# Patient Record
Sex: MALE | Race: WHITE | NOT HISPANIC OR LATINO | Employment: OTHER | URBAN - NONMETROPOLITAN AREA
[De-identification: names, ages, dates, MRNs, and addresses within clinical notes are randomized per-mention and may not be internally consistent; named-entity substitution may affect disease eponyms.]

---

## 2017-08-04 ENCOUNTER — OFFICE VISIT (OUTPATIENT)
Dept: UROLOGY | Facility: CLINIC | Age: 52
End: 2017-08-04

## 2017-08-04 VITALS
DIASTOLIC BLOOD PRESSURE: 65 MMHG | OXYGEN SATURATION: 93 % | HEART RATE: 68 BPM | SYSTOLIC BLOOD PRESSURE: 117 MMHG | TEMPERATURE: 98.7 F | RESPIRATION RATE: 16 BRPM | WEIGHT: 140.2 LBS

## 2017-08-04 DIAGNOSIS — N32.3 BLADDER DIVERTICULUM: ICD-10-CM

## 2017-08-04 DIAGNOSIS — N30.00 ACUTE CYSTITIS WITHOUT HEMATURIA: ICD-10-CM

## 2017-08-04 DIAGNOSIS — N40.0 BENIGN NON-NODULAR PROSTATIC HYPERPLASIA WITHOUT LOWER URINARY TRACT SYMPTOMS: Primary | ICD-10-CM

## 2017-08-04 LAB
BILIRUB BLD-MCNC: NEGATIVE MG/DL
CLARITY, POC: CLEAR
COLOR UR: YELLOW
GLUCOSE UR STRIP-MCNC: NEGATIVE MG/DL
KETONES UR QL: NEGATIVE
LEUKOCYTE EST, POC: ABNORMAL
NITRITE UR-MCNC: NEGATIVE MG/ML
PH UR: 7 [PH] (ref 5–8)
PROT UR STRIP-MCNC: NEGATIVE MG/DL
RBC # UR STRIP: NEGATIVE /UL
SP GR UR: 1.01 (ref 1–1.03)
UROBILINOGEN UR QL: NORMAL

## 2017-08-04 PROCEDURE — 81003 URINALYSIS AUTO W/O SCOPE: CPT | Performed by: UROLOGY

## 2017-08-04 PROCEDURE — 99213 OFFICE O/P EST LOW 20 MIN: CPT | Performed by: UROLOGY

## 2017-08-04 RX ORDER — CALCIUM CARBONATE 500(1250)
500 TABLET ORAL DAILY
Refills: 11 | COMMUNITY
Start: 2017-06-28 | End: 2021-02-17 | Stop reason: HOSPADM

## 2017-08-04 RX ORDER — LAMOTRIGINE 100 MG/1
TABLET ORAL
Refills: 11 | COMMUNITY
Start: 2017-06-28 | End: 2020-03-09 | Stop reason: HOSPADM

## 2017-08-04 RX ORDER — QUETIAPINE FUMARATE 50 MG/1
TABLET, FILM COATED ORAL
Refills: 0 | COMMUNITY
Start: 2017-06-15 | End: 2020-03-09 | Stop reason: HOSPADM

## 2017-08-04 RX ORDER — SULFAMETHOXAZOLE AND TRIMETHOPRIM 400; 80 MG/1; MG/1
1 TABLET ORAL 2 TIMES DAILY
Qty: 17 TABLET | Refills: 0 | Status: SHIPPED | OUTPATIENT
Start: 2017-08-04 | End: 2017-08-11

## 2017-08-04 RX ORDER — RISPERIDONE 2 MG/1
TABLET ORAL
Refills: 11 | COMMUNITY
Start: 2017-07-13 | End: 2017-08-04 | Stop reason: DRUGHIGH

## 2017-08-04 RX ORDER — MULTIVITAMIN
1 TABLET ORAL DAILY
Refills: 11 | COMMUNITY
Start: 2017-06-19

## 2017-08-04 RX ORDER — LANOLIN ALCOHOL/MO/W.PET/CERES
CREAM (GRAM) TOPICAL
Refills: 11 | COMMUNITY
Start: 2017-06-19 | End: 2017-08-04 | Stop reason: SDUPTHER

## 2017-08-04 RX ORDER — HALOPERIDOL DECANOATE 50 MG/ML
INJECTION INTRAMUSCULAR
Refills: 0 | COMMUNITY
Start: 2017-05-10 | End: 2020-03-09 | Stop reason: HOSPADM

## 2017-08-04 RX ORDER — TAMSULOSIN HYDROCHLORIDE 0.4 MG/1
1 CAPSULE ORAL NIGHTLY
Qty: 90 CAPSULE | Refills: 3 | Status: SHIPPED | OUTPATIENT
Start: 2017-08-04 | End: 2018-05-29 | Stop reason: SDUPTHER

## 2017-08-04 RX ORDER — LEVOTHYROXINE SODIUM 0.07 MG/1
TABLET ORAL
Refills: 11 | COMMUNITY
Start: 2017-07-13 | End: 2017-08-04 | Stop reason: DRUGHIGH

## 2017-08-04 NOTE — PROGRESS NOTES
Chief Complaint  Benign Prostatic Hypertrophy (yearly fup.)        ROBBIE Luna is a 52 y.o. male who is retarded and known have a neurogenic bladder with numerous bladder diverticuli.  His caretaker states he voids frequently but seems to empty on Flomax.  He's had one infection during the past 12 months and there are some white cells in the urine today.  He gets quite agitated with examination.    Vitals:    08/04/17 0933   BP: 117/65   Pulse: 68   Resp: 16   Temp: 98.7 °F (37.1 °C)   SpO2: 93%       Past Medical History  Past Medical History:   Diagnosis Date   • BPH with urinary obstruction    • Neurogenic bladder    • Rash    • Urinary retention        Past Surgical History  No past surgical history on file.    Medications  has a current medication list which includes the following prescription(s): alprazolam, budesonide, calcitonin (salmon), clonazepam, divalproex, docusate sodium, esomeprazole, haloperidol decanoate, lamotrigine, levothyroxine, lisinopril, melatonin, mirtazapine, multivitamin, olanzapine, oyster shell calcium, risperidone, tamsulosin, and quetiapine.      Allergies  No Known Allergies    Social History  Social History     Social History Narrative       Family History  He has no family history of bladder or kidney cancer  He has no family history of kidney stones      AUA Symptom Score:      Review of Systems  Review of Systems   Constitutional: Negative.    Genitourinary: Positive for difficulty urinating and frequency.   Psychiatric/Behavioral: Positive for agitation and confusion. The patient is nervous/anxious.    All other systems reviewed and are negative.      Physical Exam  Physical Exam    Labs Recent and today in the office:  Results for orders placed or performed in visit on 08/04/17   POC Urinalysis Dipstick, Automated   Result Value Ref Range    Color Yellow Yellow, Straw, Dark Yellow, Nohemi    Clarity, UA Clear Clear    Glucose, UA Negative Negative, 1000 mg/dL (3+) mg/dL     Bilirubin Negative Negative    Ketones, UA Negative Negative    Specific Gravity  1.015 1.005 - 1.030    Blood, UA Negative Negative    pH, Urine 7.0 5.0 - 8.0    Protein, POC Negative Negative mg/dL    Urobilinogen, UA Normal Normal    Leukocytes Moderate (2+) (A) Negative    Nitrite, UA Negative Negative         Assessment & Plan  A urine culture is submitted and he'll be given a trial of Bactrim.  If he has more than 3 infections in 1 year he should probably be on chronic suppression.  He only had 2 this year if this one is positive.

## 2017-08-06 LAB
BACTERIA UR CULT: NO GROWTH
BACTERIA UR CULT: NORMAL

## 2017-09-28 ENCOUNTER — TRANSCRIBE ORDERS (OUTPATIENT)
Dept: NUCLEAR MEDICINE | Facility: HOSPITAL | Age: 52
End: 2017-09-28

## 2017-09-28 DIAGNOSIS — R10.11 RIGHT UPPER QUADRANT ABDOMINAL PAIN: Primary | ICD-10-CM

## 2017-10-03 ENCOUNTER — HOSPITAL ENCOUNTER (OUTPATIENT)
Dept: NUCLEAR MEDICINE | Facility: HOSPITAL | Age: 52
Discharge: HOME | End: 2017-10-03

## 2017-10-03 DIAGNOSIS — R10.11 RIGHT UPPER QUADRANT ABDOMINAL PAIN: ICD-10-CM

## 2017-10-03 PROCEDURE — 0 TECHNETIUM TC 99M MEBROFENIN KIT: Performed by: NURSE PRACTITIONER

## 2017-10-03 PROCEDURE — 78227 HEPATOBIL SYST IMAGE W/DRUG: CPT

## 2017-10-03 PROCEDURE — A9537 TC99M MEBROFENIN: HCPCS | Performed by: NURSE PRACTITIONER

## 2017-10-03 RX ORDER — KIT FOR THE PREPARATION OF TECHNETIUM TC 99M MEBROFENIN 45 MG/10ML
1 INJECTION, POWDER, LYOPHILIZED, FOR SOLUTION INTRAVENOUS
Status: COMPLETED | OUTPATIENT
Start: 2017-10-03 | End: 2017-10-03

## 2017-10-03 RX ADMIN — MEBROFENIN 1 DOSE: 45 INJECTION, POWDER, LYOPHILIZED, FOR SOLUTION INTRAVENOUS at 08:25

## 2017-10-11 ENCOUNTER — TRANSCRIBE ORDERS (OUTPATIENT)
Dept: ADMINISTRATIVE | Facility: HOSPITAL | Age: 52
End: 2017-10-11

## 2017-10-11 DIAGNOSIS — R10.11 ABDOMINAL PAIN, RIGHT UPPER QUADRANT: Primary | ICD-10-CM

## 2017-10-17 ENCOUNTER — HOSPITAL ENCOUNTER (OUTPATIENT)
Dept: MRI IMAGING | Facility: HOSPITAL | Age: 52
Discharge: HOME | End: 2017-10-17

## 2017-10-17 DIAGNOSIS — R10.11 ABDOMINAL PAIN, RIGHT UPPER QUADRANT: ICD-10-CM

## 2018-01-05 ENCOUNTER — LAB REQUISITION (OUTPATIENT)
Dept: LAB | Facility: HOSPITAL | Age: 53
End: 2018-01-05

## 2018-01-05 DIAGNOSIS — R39.15 URGENCY OF URINATION: ICD-10-CM

## 2018-01-05 LAB
BACTERIA UR QL AUTO: ABNORMAL /HPF
BILIRUB UR QL STRIP: NEGATIVE
CLARITY UR: ABNORMAL
COLOR UR: YELLOW
GLUCOSE UR STRIP-MCNC: NEGATIVE MG/DL
HGB UR QL STRIP.AUTO: NEGATIVE
HYALINE CASTS UR QL AUTO: ABNORMAL /LPF
KETONES UR QL STRIP: NEGATIVE
LEUKOCYTE ESTERASE UR QL STRIP.AUTO: ABNORMAL
MUCOUS THREADS URNS QL MICRO: ABNORMAL /HPF
NITRITE UR QL STRIP: NEGATIVE
PH UR STRIP.AUTO: 6 [PH] (ref 5–8)
PROT UR QL STRIP: NEGATIVE
RBC # UR: ABNORMAL /HPF
REF LAB TEST METHOD: ABNORMAL
SP GR UR STRIP: 1.01 (ref 1–1.03)
SQUAMOUS #/AREA URNS HPF: ABNORMAL /HPF
UROBILINOGEN UR QL STRIP: ABNORMAL
WBC UR QL AUTO: ABNORMAL /HPF

## 2018-01-05 PROCEDURE — 81001 URINALYSIS AUTO W/SCOPE: CPT | Performed by: NURSE PRACTITIONER

## 2018-01-05 PROCEDURE — 87086 URINE CULTURE/COLONY COUNT: CPT | Performed by: NURSE PRACTITIONER

## 2018-01-07 LAB — BACTERIA SPEC AEROBE CULT: NORMAL

## 2018-01-19 ENCOUNTER — LAB REQUISITION (OUTPATIENT)
Dept: LAB | Facility: HOSPITAL | Age: 53
End: 2018-01-19

## 2018-01-19 DIAGNOSIS — R35.0 FREQUENCY OF MICTURITION: ICD-10-CM

## 2018-01-19 LAB
BACTERIA UR QL AUTO: ABNORMAL /HPF
BILIRUB UR QL STRIP: NEGATIVE
CLARITY UR: CLEAR
COLOR UR: ABNORMAL
GLUCOSE UR STRIP-MCNC: NEGATIVE MG/DL
HGB UR QL STRIP.AUTO: NEGATIVE
HYALINE CASTS UR QL AUTO: ABNORMAL /LPF
KETONES UR QL STRIP: NEGATIVE
LEUKOCYTE ESTERASE UR QL STRIP.AUTO: ABNORMAL
NITRITE UR QL STRIP: NEGATIVE
PH UR STRIP.AUTO: 5.5 [PH] (ref 5–8)
PROT UR QL STRIP: NEGATIVE
RBC # UR: ABNORMAL /HPF
REF LAB TEST METHOD: ABNORMAL
SP GR UR STRIP: <=1.005 (ref 1–1.03)
SQUAMOUS #/AREA URNS HPF: ABNORMAL /HPF
TRANS CELLS #/AREA URNS HPF: ABNORMAL /HPF
UNIDENT CRYS URNS QL MICRO: ABNORMAL /HPF
UROBILINOGEN UR QL STRIP: ABNORMAL
WBC CLUMPS # UR AUTO: ABNORMAL /HPF
WBC UR QL AUTO: ABNORMAL /HPF

## 2018-01-19 PROCEDURE — 81001 URINALYSIS AUTO W/SCOPE: CPT | Performed by: NURSE PRACTITIONER

## 2018-01-19 PROCEDURE — 87186 SC STD MICRODIL/AGAR DIL: CPT | Performed by: NURSE PRACTITIONER

## 2018-01-19 PROCEDURE — 87077 CULTURE AEROBIC IDENTIFY: CPT | Performed by: NURSE PRACTITIONER

## 2018-01-19 PROCEDURE — 87086 URINE CULTURE/COLONY COUNT: CPT | Performed by: NURSE PRACTITIONER

## 2018-01-23 LAB
BACTERIA SPEC AEROBE CULT: ABNORMAL
BACTERIA SPEC AEROBE CULT: ABNORMAL

## 2018-01-26 ENCOUNTER — OFFICE VISIT (OUTPATIENT)
Dept: ORTHOPEDIC SURGERY | Facility: CLINIC | Age: 53
End: 2018-01-26

## 2018-01-26 VITALS — RESPIRATION RATE: 18 BRPM | WEIGHT: 130 LBS | BODY MASS INDEX: 23.92 KG/M2 | HEIGHT: 62 IN

## 2018-01-26 DIAGNOSIS — M79.671 RIGHT FOOT PAIN: Primary | ICD-10-CM

## 2018-01-26 DIAGNOSIS — S93.401A SPRAIN OF RIGHT ANKLE, UNSPECIFIED LIGAMENT, INITIAL ENCOUNTER: Primary | ICD-10-CM

## 2018-01-26 DIAGNOSIS — S82.301A CLOSED FRACTURE OF DISTAL END OF RIGHT TIBIA, UNSPECIFIED FRACTURE MORPHOLOGY, INITIAL ENCOUNTER: ICD-10-CM

## 2018-01-26 PROCEDURE — 29405 APPL SHORT LEG CAST: CPT | Performed by: PHYSICIAN ASSISTANT

## 2018-01-26 PROCEDURE — 99203 OFFICE O/P NEW LOW 30 MIN: CPT | Performed by: PHYSICIAN ASSISTANT

## 2018-01-26 NOTE — PROGRESS NOTES
Subjective   Patient ID: Bossman Luna is a 52 y.o. right hand dominant male is here today for a treatment planning discussion. Pain of the Right Ankle         History of Present Illness  Patient presents as a new patient with complaints of right ankle pain.  He states on 1/25/2018 he stepped on a plastic tub storage lid with his left leg and fell on the right ankle.   His guardian does provide much of the history of present illness as the patient does have mental intellectual disability.  He was placed in a splint at the [facility] treatment [facility] and advised to follow-up with our office.  He is currently taking Tylenol 3.      Pain Score: 8  Pain Location: Ankle  Pain Orientation: Right     Pain Descriptors: Throbbing     Pain Onset: Sudden  Date Pain First Started: 01/25/18              Pain Intervention(s): Rest, Other (Comment), Medication (See MAR) (tylenol # and brace)  Result of Injury: Yes ([facility] [employer], stepped on a tub storage lid foot slid and fell on the other foot.)  Work-Related Injury: No    Past Medical History:   Diagnosis Date   • BPH with urinary obstruction    • GERD (gastroesophageal reflux disease)    • Glaucoma    • Neurogenic bladder    • Osteoporosis    • Prostate disorder    • Rash    • Thyroid disease    • Urinary retention         History reviewed. No pertinent surgical history.    Family History   Problem Relation Age of Onset   • Family history unknown: Yes       Social History     Social History   • Marital status: Single     Spouse name: N/A   • Number of children: N/A   • Years of education: N/A     Occupational History   •  Unemployed     Social History Main Topics   • Smoking status: Never Smoker   • Smokeless tobacco: Never Used   • Alcohol use No   • Drug use: No   • Sexual activity: No     Other Topics Concern   • Not on file     Social History Narrative       No Known Allergies    Review of Systems   Constitutional: Negative for fever.   HENT: Negative for  "voice change.    Eyes: Negative for visual disturbance.   Respiratory: Negative for shortness of breath.    Cardiovascular: Negative for chest pain.   Gastrointestinal: Negative for abdominal distention and abdominal pain.   Genitourinary: Negative for dysuria.   Musculoskeletal: Positive for arthralgias. Negative for gait problem and joint swelling.   Skin: Negative for rash.   Neurological: Negative for speech difficulty.   Hematological: Does not bruise/bleed easily.   Psychiatric/Behavioral: Negative for confusion.       Objective   Resp 18  Ht 157.5 cm (62\")  Wt 59 kg (130 lb)  BMI 23.78 kg/m2   Physical Exam   Eyes: Conjunctivae are normal.   Neck: No tracheal deviation present.   Pulmonary/Chest: No respiratory distress.   Musculoskeletal:        Right knee: He exhibits no swelling, no effusion and no ecchymosis. No tenderness found. No medial joint line and no lateral joint line tenderness noted.        Right ankle: He exhibits swelling and ecchymosis. He exhibits no laceration and normal pulse. Tenderness. Lateral malleolus and medial malleolus tenderness found. No head of 5th metatarsal and no proximal fibula tenderness found. Achilles tendon exhibits no pain and no defect.        Right foot: There is tenderness (5th mt ) and swelling. There is normal capillary refill, no crepitus, no deformity and no laceration.   Neurological: He is alert.   Skin: No rash noted.   Psychiatric: He has a normal mood and affect.   Vitals reviewed.    Right Knee Exam     Other   Other tests: no effusion present           Extremity DVT signs are Negative on physical exam with negative Pillo sign, with no calf pain, with no palpable cords, with no increased pain with passive stretch/extension and with no skin tone change   Neurologic Exam   Right Ankle Exam     Tenderness   The patient is experiencing tenderness in the medial malleolus, no proximal fibula.    Right Knee Exam     Tenderness   The patient is experiencing " tenderness in the no medial joint line, no lateral joint line.               Assessment/Plan   Independent Review of Radiographic Studies:      No acute fracture appreciated to the right foot x-ray.  I did review x-ray imaging of the right ankle from urgent treatment center.  There does appear to be an abnormal linear lucency along the lateral aspect of the distal tibia.      Procedures       Bossman was seen today for pain.    Diagnoses and all orders for this visit:    Sprain of right ankle, unspecified ligament, initial encounter    Closed fracture of distal end of right tibia, unspecified fracture morphology, initial encounter     Orthopedic activities reviewed and patient expressed appreciation  Discussion of orthopedic goals  Risk, benefits, and merits of treatment alternatives reviewed with the patient and questions answered  Elevate leg for residual swelling  Reduced physical activity as appropriate  Weight bearing parameters reviewed  Avoid offending activity    Recommendations/Plan:  Patient and guardian(s) are encouraged to call or return for any issues or concerns.    Patient was placed in a short leg fiberglass cast with good padding.  Patient is advised to not bear weight.  Re-x-ray in 2 weeks.  Patient agreeable to call or return sooner for any concerns.

## 2018-02-07 DIAGNOSIS — S82.301A CLOSED FRACTURE OF DISTAL END OF RIGHT TIBIA, UNSPECIFIED FRACTURE MORPHOLOGY, INITIAL ENCOUNTER: Primary | ICD-10-CM

## 2018-02-09 ENCOUNTER — OFFICE VISIT (OUTPATIENT)
Dept: ORTHOPEDIC SURGERY | Facility: CLINIC | Age: 53
End: 2018-02-09

## 2018-02-09 VITALS — HEIGHT: 62 IN | WEIGHT: 130.07 LBS | BODY MASS INDEX: 23.94 KG/M2 | RESPIRATION RATE: 16 BRPM

## 2018-02-09 DIAGNOSIS — S82.301A CLOSED FRACTURE OF DISTAL END OF RIGHT TIBIA, UNSPECIFIED FRACTURE MORPHOLOGY, INITIAL ENCOUNTER: Primary | ICD-10-CM

## 2018-02-09 PROCEDURE — 99213 OFFICE O/P EST LOW 20 MIN: CPT | Performed by: PHYSICIAN ASSISTANT

## 2018-02-09 NOTE — PROGRESS NOTES
"Subjective   Patient ID: Bossman Luna is a 52 y.o.male   No chief complaint on file.       History of Present Illness    Patient is following up for scheduled appointment in regards to right ankle injury.     He states on 1/25/2018 he stepped on a plastic tub storage lid with his left leg and fell on the right ankle.     Patient was seen in the office 1/26/2018 placed in a fiberglass short leg cast for a suspected distal tibia fracture to the lateral aspect of the tibia.      Past Medical History:   Diagnosis Date   • BPH with urinary obstruction    • GERD (gastroesophageal reflux disease)    • Glaucoma    • Neurogenic bladder    • Osteoporosis    • Prostate disorder    • Rash    • Thyroid disease    • Urinary retention         No past surgical history on file.     Family History   Problem Relation Age of Onset   • Family history unknown: Yes        Social History     Social History   • Marital status: Single     Spouse name: N/A   • Number of children: N/A   • Years of education: N/A     Occupational History   •  Unemployed     Social History Main Topics   • Smoking status: Never Smoker   • Smokeless tobacco: Never Used   • Alcohol use No   • Drug use: No   • Sexual activity: No     Other Topics Concern   • Not on file     Social History Narrative       No Known Allergies    Review of Systems   Musculoskeletal: Positive for arthralgias (right ankle pain).   All other systems reviewed and are negative.        Objective   Resp 16  Ht 157.5 cm (62.01\")  Wt 59 kg (130 lb 1.1 oz)  BMI 23.78 kg/m2    Physical Exam   Constitutional: He appears well-nourished.   Neck: No tracheal deviation present.   Pulmonary/Chest: No respiratory distress.   Neurological: He is alert.   Vitals reviewed.    Ortho Exam    Neurologic Exam  Ortho Exam    The cast is well intact.   He has good brisk cap refill to all digits of the foot.     Assessment/Plan     Procedures  [x]  No procedures were performed in office " today.    Diagnoses and all orders for this visit:    Closed fracture of distal end of right tibia, unspecified fracture morphology, initial encounter     Orthopaedic activities reviewed and patient and guardian(s) expressed appreciation  Discussion of orthopedic goals  Risk, benefits, and merits of treatment alternatives reviewed with the patient and questions answered  Weight bearing parameters reviewed    Recommendations/Plan:  Patient and guardian(s) are encouraged to call or return for any issues or concerns.     FU in 2 weeks XOA  Patient was left in his fiberglass short leg cast. NWB    Patient agreeable to call or return sooner for any concerns.

## 2018-02-23 ENCOUNTER — OFFICE VISIT (OUTPATIENT)
Dept: ORTHOPEDIC SURGERY | Facility: CLINIC | Age: 53
End: 2018-02-23

## 2018-02-23 VITALS — BODY MASS INDEX: 23.92 KG/M2 | WEIGHT: 130 LBS | RESPIRATION RATE: 18 BRPM | HEIGHT: 62 IN

## 2018-02-23 DIAGNOSIS — S82.301D CLOSED FRACTURE OF DISTAL END OF RIGHT TIBIA WITH ROUTINE HEALING, UNSPECIFIED FRACTURE MORPHOLOGY, SUBSEQUENT ENCOUNTER: Primary | ICD-10-CM

## 2018-02-23 DIAGNOSIS — S82.301A CLOSED FRACTURE OF DISTAL END OF RIGHT TIBIA, UNSPECIFIED FRACTURE MORPHOLOGY, INITIAL ENCOUNTER: Primary | ICD-10-CM

## 2018-02-23 PROCEDURE — 99213 OFFICE O/P EST LOW 20 MIN: CPT | Performed by: PHYSICIAN ASSISTANT

## 2018-02-23 NOTE — PROGRESS NOTES
Subjective   Patient ID: Bossman Luna is a 52 y.o. right hand dominant male is here today for follow-up for right ankle injury Fracture and Follow-up of the Right Ankle         History of Present Illness        He states on 1/25/2018 he stepped on a plastic tub storage lid with his left leg and fell on the right ankle.     Patient was seen in the office 1/26/2018 placed in a fiberglass short leg cast for a suspected distal tibia fracture to the lateral aspect of the tibia.  Pain Score: 4  Pain Location: Ankle  Pain Orientation: Right     Pain Descriptors: Aching              Aggravating Factors: Standing, Walking        Pain Intervention(s): Rest          Past Medical History:   Diagnosis Date   • BPH with urinary obstruction    • GERD (gastroesophageal reflux disease)    • Glaucoma    • Neurogenic bladder    • Osteoporosis    • Prostate disorder    • Rash    • Thyroid disease    • Urinary retention         No past surgical history on file.    Family History   Problem Relation Age of Onset   • Family history unknown: Yes       Social History     Social History   • Marital status: Single     Spouse name: N/A   • Number of children: N/A   • Years of education: N/A     Occupational History   •  Unemployed     Social History Main Topics   • Smoking status: Never Smoker   • Smokeless tobacco: Never Used   • Alcohol use No   • Drug use: No   • Sexual activity: No     Other Topics Concern   • Not on file     Social History Narrative       No Known Allergies    Review of Systems   Constitutional: Negative for fever.   HENT: Negative for voice change.    Eyes: Negative for visual disturbance.   Respiratory: Negative for shortness of breath.    Cardiovascular: Negative for chest pain.   Gastrointestinal: Negative for abdominal distention and abdominal pain.   Genitourinary: Negative for dysuria.   Musculoskeletal: Positive for arthralgias. Negative for gait problem and joint swelling.   Skin: Negative for rash.  "  Neurological: Negative for speech difficulty.   Hematological: Does not bruise/bleed easily.   Psychiatric/Behavioral: Negative for confusion.   All other systems reviewed and are negative.      Objective   Resp 18  Ht 157.5 cm (62.01\")  Wt 59 kg (130 lb)  BMI 23.77 kg/m2   Physical Exam  Ortho Exam   Extremity DVT signs are Negative by clinical screen.    Neurologic Exam   Ortho Exam       There is a good brisk cap refill to all digits of the right foot.  The cast is in pristine condition.  Assessment/Plan   Independent Review of Radiographic Studies:    Indication to evaluate fracture healing, and compared with prior imaging, shows interm fracture healing, callus formation and or periostitis in continued good position and alignment.      Procedures       Bossman was seen today for fracture and follow-up.    Diagnoses and all orders for this visit:    Closed fracture of distal end of right tibia with routine healing, unspecified fracture morphology, subsequent encounter  -     Ambulatory Referral to Physical Therapy Ortho, Evaluate and treat     Orthopaedic activities reviewed and patient and guardian(s) expressed appreciation  Discussion of orthopedic goals  Risk, benefits, and merits of treatment alternatives reviewed with the patient and questions answered  Reduced physical activity as appropriate  Weight bearing parameters reviewed  Avoid offending activity    Recommendations/Plan:  Patient and guardian(s) are encouraged to call or return for any issues or concerns.    Given the patient's cast is in good condition and he denies any complaints associated with the cast we will leave this in place.  Patient agreeable to call or return sooner for any concerns.       "

## 2018-03-21 DIAGNOSIS — S82.301D CLOSED FRACTURE OF DISTAL END OF RIGHT TIBIA WITH ROUTINE HEALING, UNSPECIFIED FRACTURE MORPHOLOGY, SUBSEQUENT ENCOUNTER: Primary | ICD-10-CM

## 2018-03-22 ENCOUNTER — OFFICE VISIT (OUTPATIENT)
Dept: ORTHOPEDIC SURGERY | Facility: CLINIC | Age: 53
End: 2018-03-22

## 2018-03-22 VITALS — RESPIRATION RATE: 16 BRPM | WEIGHT: 130.07 LBS | BODY MASS INDEX: 23.94 KG/M2 | HEIGHT: 62 IN

## 2018-03-22 DIAGNOSIS — R29.898 ANKLE WEAKNESS: Primary | ICD-10-CM

## 2018-03-22 DIAGNOSIS — S82.302D CLOSED FRACTURE OF DISTAL END OF LEFT TIBIA WITH ROUTINE HEALING, UNSPECIFIED FRACTURE MORPHOLOGY, SUBSEQUENT ENCOUNTER: ICD-10-CM

## 2018-03-22 PROCEDURE — 99213 OFFICE O/P EST LOW 20 MIN: CPT | Performed by: PHYSICIAN ASSISTANT

## 2018-03-22 NOTE — PROGRESS NOTES
Subjective   Patient ID: Bossman Luna is a 52 y.o.  male  Follow-up of the Right Ankle (Patient states having no pain. Patient is right hand dominant)         History of Present Illness    Patient is following up at a routine follow-up visit in regards to right ankle fracture.  Initial date of injury was 1/25/2018.    Patient has been immobilized in a short leg cast since 1-26-18.      Pain Score: no pain  Pain Location: Ankle  Pain Orientation: Right                                        Past Medical History:   Diagnosis Date   • BPH with urinary obstruction    • GERD (gastroesophageal reflux disease)    • Glaucoma    • Neurogenic bladder    • Osteoporosis    • Prostate disorder    • Rash    • Thyroid disease    • Urinary retention         No past surgical history on file.    Family History   Problem Relation Age of Onset   • Family history unknown: Yes       Social History     Social History   • Marital status: Single     Spouse name: N/A   • Number of children: N/A   • Years of education: N/A     Occupational History   •  Unemployed     Social History Main Topics   • Smoking status: Never Smoker   • Smokeless tobacco: Never Used   • Alcohol use No   • Drug use: No   • Sexual activity: No     Other Topics Concern   • Not on file     Social History Narrative   • No narrative on file       No Known Allergies    Review of Systems   Constitutional: Negative for diaphoresis, fever and unexpected weight change.   HENT: Negative for dental problem and sore throat.    Eyes: Negative for visual disturbance.   Respiratory: Negative for shortness of breath.    Cardiovascular: Negative for chest pain.   Gastrointestinal: Negative for abdominal pain, constipation, diarrhea, nausea and vomiting.   Genitourinary: Negative for difficulty urinating and frequency.   Neurological: Negative for headaches.   Hematological: Does not bruise/bleed easily.   All other systems reviewed and are negative.      Objective   Resp 16   " Ht 157.5 cm (62.01\")   Wt 59 kg (130 lb 1.1 oz)   BMI 23.78 kg/m²    Physical Exam   Constitutional: He appears well-nourished.   Eyes: Conjunctivae are normal.   Pulmonary/Chest: Effort normal. No respiratory distress.   Musculoskeletal:        Right knee: He exhibits no swelling. No tenderness found.        Right ankle: He exhibits decreased range of motion (stiffness). He exhibits no ecchymosis, no deformity, no laceration and normal pulse. No tenderness. No lateral malleolus and no medial malleolus tenderness found. Achilles tendon exhibits no pain and normal Felix's test results.        Right foot: There is normal range of motion, no tenderness, no bony tenderness, no swelling, normal capillary refill, no crepitus and no deformity.   Neurological: He is alert.   Skin: Capillary refill takes less than 2 seconds. No rash noted.   Psychiatric: His behavior is normal.   Vitals reviewed.    Ortho Exam   Extremity DVT signs are Negative on physical exam with negative Pillo sign, with no calf pain, with no palpable cords, with no increased pain with passive stretch/extension and with no skin tone change   Neurologic Exam   Right Ankle Exam     Tenderness   The patient is experiencing tenderness in the no medial malleolus.               Assessment/Plan   Independent Review of Radiographic Studies:    Indication to evaluate fracture healing, and compared with prior imaging, shows interm fracture healing, callus formation and or periostitis in continued good position and alignment.      Becky Keita was seen today for follow-up.    Diagnoses and all orders for this visit:    Ankle weakness  -     Ambulatory Referral to Home Health    Closed fracture of distal end of left tibia with routine healing, unspecified fracture morphology, subsequent encounter  -     Ambulatory Referral to Home Health       Orthopaedic activities reviewed and patient and guardian(s) expressed appreciation  Discussion of " orthopedic goals  Risk, benefits, and merits of treatment alternatives reviewed with the patient and questions answered  Physical therapy referral given    Recommendations/Plan:  Patient and guardian(s) are encouraged to call or return for any issues or concerns.    FU in 5 weeks  The fiberglass cast was removed.  Patient was placed in a pneumatic walking boot to assist with ambulation.  Patient does require maximal assistance while beginning physical therapy.  Patient agreeable to call or return sooner for any concerns.

## 2018-04-25 DIAGNOSIS — S82.301D CLOSED FRACTURE OF DISTAL END OF RIGHT TIBIA WITH ROUTINE HEALING, UNSPECIFIED FRACTURE MORPHOLOGY, SUBSEQUENT ENCOUNTER: Primary | ICD-10-CM

## 2018-04-26 ENCOUNTER — OFFICE VISIT (OUTPATIENT)
Dept: ORTHOPEDIC SURGERY | Facility: CLINIC | Age: 53
End: 2018-04-26

## 2018-04-26 VITALS — RESPIRATION RATE: 16 BRPM | HEIGHT: 62 IN | BODY MASS INDEX: 23.94 KG/M2 | WEIGHT: 130.07 LBS

## 2018-04-26 DIAGNOSIS — S90.414A TOE ABRASION, RIGHT, INITIAL ENCOUNTER: ICD-10-CM

## 2018-04-26 DIAGNOSIS — Z09 FOLLOW UP: Primary | ICD-10-CM

## 2018-04-26 DIAGNOSIS — S82.301D CLOSED FRACTURE OF DISTAL END OF RIGHT TIBIA WITH ROUTINE HEALING, UNSPECIFIED FRACTURE MORPHOLOGY, SUBSEQUENT ENCOUNTER: ICD-10-CM

## 2018-04-26 PROCEDURE — 99213 OFFICE O/P EST LOW 20 MIN: CPT | Performed by: PHYSICIAN ASSISTANT

## 2018-04-26 NOTE — PROGRESS NOTES
Subjective   Patient ID: Bossman Luna is a 52 y.o.  male  Follow-up of the Right Ankle (Closed fracture of distal end of left tibia with routine healing, unspecified fracture morphology, subsequent encounter)         History of Present Illness  Patient is following up at a routine follow-up visit in regards to right ankle fracture.  Initial date of injury was 1/25/2018.    The caretaker that accompanies the patient states he noticed a scab on top of the second toe last week.  The patient denies injury or trauma to this area  Pain Score: no pain  Pain Location: Ankle  Pain Orientation: Right                                Result of Injury: Yes  Work-Related Injury: No    Past Medical History:   Diagnosis Date   • BPH with urinary obstruction    • GERD (gastroesophageal reflux disease)    • Glaucoma    • Neurogenic bladder    • Osteoporosis    • Prostate disorder    • Rash    • Thyroid disease    • Urinary retention         No past surgical history on file.    Family History   Problem Relation Age of Onset   • Family history unknown: Yes       Social History     Social History   • Marital status: Single     Spouse name: N/A   • Number of children: N/A   • Years of education: N/A     Occupational History   •  Unemployed     Social History Main Topics   • Smoking status: Never Smoker   • Smokeless tobacco: Never Used   • Alcohol use No   • Drug use: No   • Sexual activity: No     Other Topics Concern   • Not on file     Social History Narrative   • No narrative on file       No Known Allergies    Review of Systems   Constitutional: Negative for fever.   HENT: Negative for voice change.    Eyes: Negative for visual disturbance.   Respiratory: Negative for shortness of breath.    Cardiovascular: Negative for chest pain.   Gastrointestinal: Negative for abdominal distention and abdominal pain.   Genitourinary: Negative for dysuria.   Musculoskeletal: Positive for arthralgias. Negative for gait problem and joint  "swelling.   Skin: Negative for rash.   Neurological: Negative for speech difficulty.   Hematological: Does not bruise/bleed easily.   Psychiatric/Behavioral: Negative for confusion.   All other systems reviewed and are negative.      Objective   Resp 16   Ht 157.5 cm (62.01\")   Wt 59 kg (130 lb 1.1 oz)   BMI 23.78 kg/m²    Physical Exam   Constitutional: He is oriented to person, place, and time. He appears well-nourished.   Pulmonary/Chest: Breath sounds normal. No respiratory distress.   Musculoskeletal:        Right ankle: He exhibits no swelling, no ecchymosis, no deformity and no laceration. No tenderness. No lateral malleolus, no medial malleolus, no AITFL, no CF ligament, no posterior TFL and no head of 5th metatarsal tenderness found. Achilles tendon exhibits no pain, no defect and normal Felix's test results.        Right foot: There is normal range of motion, no swelling, normal capillary refill and no crepitus.   Neurological: He is alert and oriented to person, place, and time.   Skin: Capillary refill takes less than 2 seconds.   Psychiatric: He has a normal mood and affect.   Vitals reviewed.    Ortho Exam   Extremity DVT signs are Negative on physical exam with negative Pillo sign, with no calf pain, with no palpable cords, with no increased pain with passive stretch/extension and with no skin tone change   Neurologic Exam     Mental Status   Oriented to person, place, and time.      Right Ankle Exam     Tenderness   The patient is experiencing tenderness in the no medial malleolus, no CF ligament.           The right second toe dorsum-is a very small less than 0.25 cm superficial scabbed abrasion with no surrounding erythema.  No drainage.  No foul odor.      Assessment/Plan   Independent Review of Radiographic Studies:    Indication to evaluate fracture healing, and compared with prior imaging, shows interm fracture healing, callus formation and or periostitis in continued good position and " alignment.      Procedures       Bossman was seen today for follow-up.    Diagnoses and all orders for this visit:    Follow up  -     XR Ankle 3+ View Right    Closed fracture of distal end of right tibia with routine healing, unspecified fracture morphology, subsequent encounter    Toe abrasion, right, initial encounter       Orthopaedic activities reviewed and patient and guardian(s) expressed appreciation  Discussion of orthopedic goals  Risk, benefits, and merits of treatment alternatives reviewed with the patient and questions answered    Recommendations/Plan:  Patient and guardian(s) are encouraged to call or return for any issues or concerns.    FU with PCP in regards to the right 2nd toe ulceration within 7-10 days  The toe was cleansed and a nonstick dressing with Kerlix was applied to prevent further friction injury  Patient agreeable to call or return sooner for any concerns.

## 2018-05-11 ENCOUNTER — LAB REQUISITION (OUTPATIENT)
Dept: LAB | Facility: HOSPITAL | Age: 53
End: 2018-05-11

## 2018-05-11 DIAGNOSIS — R79.9 ABNORMAL FINDING OF BLOOD CHEMISTRY: ICD-10-CM

## 2018-05-11 DIAGNOSIS — R63.0 ANOREXIA: ICD-10-CM

## 2018-05-11 DIAGNOSIS — R41.82 ALTERED MENTAL STATUS: ICD-10-CM

## 2018-05-11 DIAGNOSIS — E03.9 HYPOTHYROIDISM: ICD-10-CM

## 2018-05-11 LAB
BILIRUB UR QL STRIP: NEGATIVE
CLARITY UR: CLEAR
COLOR UR: YELLOW
GLUCOSE UR STRIP-MCNC: NEGATIVE MG/DL
HGB UR QL STRIP.AUTO: NEGATIVE
KETONES UR QL STRIP: NEGATIVE
LEUKOCYTE ESTERASE UR QL STRIP.AUTO: NEGATIVE
NITRITE UR QL STRIP: NEGATIVE
PH UR STRIP.AUTO: 7 [PH] (ref 5–8)
PROT UR QL STRIP: NEGATIVE
SP GR UR STRIP: 1.01 (ref 1–1.03)
UROBILINOGEN UR QL STRIP: NORMAL

## 2018-05-11 PROCEDURE — 81003 URINALYSIS AUTO W/O SCOPE: CPT | Performed by: NURSE PRACTITIONER

## 2018-05-29 DIAGNOSIS — N40.0 BENIGN NON-NODULAR PROSTATIC HYPERPLASIA WITHOUT LOWER URINARY TRACT SYMPTOMS: ICD-10-CM

## 2018-05-29 DIAGNOSIS — N30.00 ACUTE CYSTITIS WITHOUT HEMATURIA: ICD-10-CM

## 2018-05-29 DIAGNOSIS — N32.3 BLADDER DIVERTICULUM: ICD-10-CM

## 2018-05-30 RX ORDER — TAMSULOSIN HYDROCHLORIDE 0.4 MG/1
CAPSULE ORAL
Qty: 90 CAPSULE | Refills: 5 | Status: SHIPPED | OUTPATIENT
Start: 2018-05-30

## 2019-02-12 ENCOUNTER — LAB REQUISITION (OUTPATIENT)
Dept: LAB | Facility: HOSPITAL | Age: 54
End: 2019-02-12

## 2019-02-12 DIAGNOSIS — N39.0 URINARY TRACT INFECTION: ICD-10-CM

## 2019-02-12 LAB
BACTERIA UR QL AUTO: ABNORMAL /HPF
BILIRUB UR QL STRIP: NEGATIVE
CLARITY UR: CLEAR
COLOR UR: YELLOW
GLUCOSE UR STRIP-MCNC: NEGATIVE MG/DL
HGB UR QL STRIP.AUTO: NEGATIVE
HYALINE CASTS UR QL AUTO: ABNORMAL /LPF
KETONES UR QL STRIP: NEGATIVE
LEUKOCYTE ESTERASE UR QL STRIP.AUTO: NEGATIVE
NITRITE UR QL STRIP: NEGATIVE
PH UR STRIP.AUTO: 7 [PH] (ref 5–8)
PROT UR QL STRIP: NEGATIVE
RBC # UR: ABNORMAL /HPF
REF LAB TEST METHOD: ABNORMAL
SP GR UR STRIP: 1.01 (ref 1–1.03)
SQUAMOUS #/AREA URNS HPF: ABNORMAL /HPF
UROBILINOGEN UR QL STRIP: NORMAL
WBC UR QL AUTO: ABNORMAL /HPF

## 2019-02-12 PROCEDURE — 81001 URINALYSIS AUTO W/SCOPE: CPT | Performed by: NURSE PRACTITIONER

## 2019-02-12 PROCEDURE — 87086 URINE CULTURE/COLONY COUNT: CPT | Performed by: NURSE PRACTITIONER

## 2019-02-13 ENCOUNTER — TRANSCRIBE ORDERS (OUTPATIENT)
Dept: ULTRASOUND IMAGING | Facility: HOSPITAL | Age: 54
End: 2019-02-13

## 2019-02-13 DIAGNOSIS — N31.9 NEUROGENIC DYSFUNCTION OF THE URINARY BLADDER: Primary | ICD-10-CM

## 2019-02-14 LAB — BACTERIA SPEC AEROBE CULT: NO GROWTH

## 2019-02-21 ENCOUNTER — HOSPITAL ENCOUNTER (OUTPATIENT)
Dept: ULTRASOUND IMAGING | Facility: HOSPITAL | Age: 54
Discharge: HOME | End: 2019-02-21
Admitting: UROLOGY

## 2019-02-21 DIAGNOSIS — N31.9 NEUROGENIC DYSFUNCTION OF THE URINARY BLADDER: ICD-10-CM

## 2019-02-21 PROCEDURE — 76857 US EXAM PELVIC LIMITED: CPT

## 2019-05-20 ENCOUNTER — LAB REQUISITION (OUTPATIENT)
Dept: LAB | Facility: HOSPITAL | Age: 54
End: 2019-05-20

## 2019-05-20 DIAGNOSIS — R39.15 URGENCY OF URINATION: ICD-10-CM

## 2019-05-20 LAB
BACTERIA UR QL AUTO: ABNORMAL /HPF
BILIRUB UR QL STRIP: NEGATIVE
CLARITY UR: ABNORMAL
COLOR UR: YELLOW
GLUCOSE UR STRIP-MCNC: NEGATIVE MG/DL
HGB UR QL STRIP.AUTO: ABNORMAL
HYALINE CASTS UR QL AUTO: ABNORMAL /LPF
KETONES UR QL STRIP: NEGATIVE
LEUKOCYTE ESTERASE UR QL STRIP.AUTO: ABNORMAL
NITRITE UR QL STRIP: NEGATIVE
PH UR STRIP.AUTO: 5.5 [PH] (ref 5–8)
PROT UR QL STRIP: ABNORMAL
RBC # UR: ABNORMAL /HPF
REF LAB TEST METHOD: ABNORMAL
SP GR UR STRIP: 1.01 (ref 1–1.03)
SQUAMOUS #/AREA URNS HPF: ABNORMAL /HPF
UROBILINOGEN UR QL STRIP: ABNORMAL
WBC UR QL AUTO: ABNORMAL /HPF

## 2019-05-20 PROCEDURE — 81001 URINALYSIS AUTO W/SCOPE: CPT | Performed by: NURSE PRACTITIONER

## 2019-05-20 PROCEDURE — 87077 CULTURE AEROBIC IDENTIFY: CPT | Performed by: NURSE PRACTITIONER

## 2019-05-20 PROCEDURE — 87186 SC STD MICRODIL/AGAR DIL: CPT | Performed by: NURSE PRACTITIONER

## 2019-05-20 PROCEDURE — 87086 URINE CULTURE/COLONY COUNT: CPT | Performed by: NURSE PRACTITIONER

## 2019-05-22 LAB — BACTERIA SPEC AEROBE CULT: ABNORMAL

## 2019-10-30 ENCOUNTER — LAB REQUISITION (OUTPATIENT)
Dept: LAB | Facility: HOSPITAL | Age: 54
End: 2019-10-30

## 2019-10-30 DIAGNOSIS — R32 UNSPECIFIED URINARY INCONTINENCE: ICD-10-CM

## 2019-10-30 LAB
BACTERIA UR QL AUTO: ABNORMAL /HPF
BILIRUB UR QL STRIP: NEGATIVE
CLARITY UR: ABNORMAL
COLOR UR: YELLOW
GLUCOSE UR STRIP-MCNC: NEGATIVE MG/DL
HGB UR QL STRIP.AUTO: ABNORMAL
HYALINE CASTS UR QL AUTO: ABNORMAL /LPF
KETONES UR QL STRIP: NEGATIVE
LEUKOCYTE ESTERASE UR QL STRIP.AUTO: ABNORMAL
NITRITE UR QL STRIP: NEGATIVE
PH UR STRIP.AUTO: 6.5 [PH] (ref 5–8)
PROT UR QL STRIP: NEGATIVE
RBC # UR: ABNORMAL /HPF
REF LAB TEST METHOD: ABNORMAL
SP GR UR STRIP: <=1.005 (ref 1–1.03)
SQUAMOUS #/AREA URNS HPF: ABNORMAL /HPF
UROBILINOGEN UR QL STRIP: ABNORMAL
WBC UR QL AUTO: ABNORMAL /HPF

## 2019-10-30 PROCEDURE — 87088 URINE BACTERIA CULTURE: CPT | Performed by: NURSE PRACTITIONER

## 2019-10-30 PROCEDURE — 87186 SC STD MICRODIL/AGAR DIL: CPT | Performed by: NURSE PRACTITIONER

## 2019-10-30 PROCEDURE — 87086 URINE CULTURE/COLONY COUNT: CPT | Performed by: NURSE PRACTITIONER

## 2019-10-30 PROCEDURE — 81001 URINALYSIS AUTO W/SCOPE: CPT | Performed by: NURSE PRACTITIONER

## 2019-11-01 LAB — BACTERIA SPEC AEROBE CULT: ABNORMAL

## 2019-11-20 ENCOUNTER — LAB REQUISITION (OUTPATIENT)
Dept: LAB | Facility: HOSPITAL | Age: 54
End: 2019-11-20

## 2019-11-20 DIAGNOSIS — N39.0 URINARY TRACT INFECTION, SITE NOT SPECIFIED: ICD-10-CM

## 2019-11-20 LAB
BACTERIA UR QL AUTO: ABNORMAL /HPF
BILIRUB UR QL STRIP: NEGATIVE
CLARITY UR: CLEAR
COLOR UR: YELLOW
GLUCOSE UR STRIP-MCNC: NEGATIVE MG/DL
HGB UR QL STRIP.AUTO: NEGATIVE
HYALINE CASTS UR QL AUTO: ABNORMAL /LPF
KETONES UR QL STRIP: NEGATIVE
LEUKOCYTE ESTERASE UR QL STRIP.AUTO: ABNORMAL
NITRITE UR QL STRIP: NEGATIVE
PH UR STRIP.AUTO: 7.5 [PH] (ref 5–8)
PROT UR QL STRIP: NEGATIVE
RBC # UR: ABNORMAL /HPF
REF LAB TEST METHOD: ABNORMAL
SP GR UR STRIP: 1.01 (ref 1–1.03)
SQUAMOUS #/AREA URNS HPF: ABNORMAL /HPF
UROBILINOGEN UR QL STRIP: ABNORMAL
WBC UR QL AUTO: ABNORMAL /HPF

## 2019-11-20 PROCEDURE — 87086 URINE CULTURE/COLONY COUNT: CPT | Performed by: NURSE PRACTITIONER

## 2019-11-20 PROCEDURE — 87077 CULTURE AEROBIC IDENTIFY: CPT | Performed by: NURSE PRACTITIONER

## 2019-11-20 PROCEDURE — 81001 URINALYSIS AUTO W/SCOPE: CPT | Performed by: NURSE PRACTITIONER

## 2019-11-20 PROCEDURE — 87186 SC STD MICRODIL/AGAR DIL: CPT | Performed by: NURSE PRACTITIONER

## 2019-11-23 LAB — BACTERIA SPEC AEROBE CULT: ABNORMAL

## 2019-12-16 ENCOUNTER — LAB REQUISITION (OUTPATIENT)
Dept: LAB | Facility: HOSPITAL | Age: 54
End: 2019-12-16

## 2019-12-16 DIAGNOSIS — N39.0 URINARY TRACT INFECTION, SITE NOT SPECIFIED: ICD-10-CM

## 2019-12-16 LAB
BACTERIA UR QL AUTO: ABNORMAL /HPF
BILIRUB UR QL STRIP: NEGATIVE
CLARITY UR: ABNORMAL
COLOR UR: YELLOW
GLUCOSE UR STRIP-MCNC: NEGATIVE MG/DL
HGB UR QL STRIP.AUTO: NEGATIVE
HYALINE CASTS UR QL AUTO: ABNORMAL /LPF
KETONES UR QL STRIP: NEGATIVE
LEUKOCYTE ESTERASE UR QL STRIP.AUTO: ABNORMAL
MUCOUS THREADS URNS QL MICRO: ABNORMAL /HPF
NITRITE UR QL STRIP: NEGATIVE
PH UR STRIP.AUTO: 8.5 [PH] (ref 5–8)
PROT UR QL STRIP: NEGATIVE
RBC # UR: ABNORMAL /HPF
REF LAB TEST METHOD: ABNORMAL
SP GR UR STRIP: 1.02 (ref 1–1.03)
SQUAMOUS #/AREA URNS HPF: ABNORMAL /HPF
TRI-PHOS CRY URNS QL MICRO: ABNORMAL /HPF
UROBILINOGEN UR QL STRIP: ABNORMAL
WBC CLUMPS # UR AUTO: ABNORMAL /HPF
WBC UR QL AUTO: ABNORMAL /HPF

## 2019-12-16 PROCEDURE — 81001 URINALYSIS AUTO W/SCOPE: CPT | Performed by: NURSE PRACTITIONER

## 2019-12-16 PROCEDURE — 87186 SC STD MICRODIL/AGAR DIL: CPT | Performed by: NURSE PRACTITIONER

## 2019-12-16 PROCEDURE — 87088 URINE BACTERIA CULTURE: CPT | Performed by: NURSE PRACTITIONER

## 2019-12-16 PROCEDURE — 87086 URINE CULTURE/COLONY COUNT: CPT | Performed by: NURSE PRACTITIONER

## 2019-12-18 LAB — BACTERIA SPEC AEROBE CULT: ABNORMAL

## 2020-01-02 ENCOUNTER — LAB REQUISITION (OUTPATIENT)
Dept: LAB | Facility: HOSPITAL | Age: 55
End: 2020-01-02

## 2020-01-02 DIAGNOSIS — N39.0 URINARY TRACT INFECTION, SITE NOT SPECIFIED: ICD-10-CM

## 2020-01-02 PROCEDURE — 87186 SC STD MICRODIL/AGAR DIL: CPT | Performed by: NURSE PRACTITIONER

## 2020-01-02 PROCEDURE — 87086 URINE CULTURE/COLONY COUNT: CPT | Performed by: NURSE PRACTITIONER

## 2020-01-02 PROCEDURE — 87077 CULTURE AEROBIC IDENTIFY: CPT | Performed by: NURSE PRACTITIONER

## 2020-01-05 LAB — BACTERIA SPEC AEROBE CULT: ABNORMAL

## 2020-01-10 ENCOUNTER — TRANSCRIBE ORDERS (OUTPATIENT)
Dept: BONE DENSITY | Facility: HOSPITAL | Age: 55
End: 2020-01-10

## 2020-01-10 DIAGNOSIS — S92.354A NONDISPLACED FRACTURE OF FIFTH METATARSAL BONE, RIGHT FOOT, INITIAL ENCOUNTER FOR CLOSED FRACTURE: Primary | ICD-10-CM

## 2020-01-16 ENCOUNTER — APPOINTMENT (OUTPATIENT)
Dept: BONE DENSITY | Facility: HOSPITAL | Age: 55
End: 2020-01-16

## 2020-01-16 DIAGNOSIS — S92.354A NONDISPLACED FRACTURE OF FIFTH METATARSAL BONE, RIGHT FOOT, INITIAL ENCOUNTER FOR CLOSED FRACTURE: ICD-10-CM

## 2020-01-16 PROCEDURE — 77080 DXA BONE DENSITY AXIAL: CPT

## 2020-02-26 ENCOUNTER — PREP FOR SURGERY (OUTPATIENT)
Dept: OTHER | Facility: HOSPITAL | Age: 55
End: 2020-02-26

## 2020-02-26 DIAGNOSIS — Q12.0 POSTERIOR POLAR CATARACT OF BOTH EYES: Primary | ICD-10-CM

## 2020-02-26 RX ORDER — PHENYLEPHRINE HYDROCHLORIDE 100 MG/ML
1 SOLUTION/ DROPS OPHTHALMIC
Status: CANCELLED | OUTPATIENT
Start: 2020-03-09 | End: 2020-03-09

## 2020-02-26 RX ORDER — CYCLOPENTOLATE HYDROCHLORIDE 20 MG/ML
1 SOLUTION/ DROPS OPHTHALMIC
Status: CANCELLED | OUTPATIENT
Start: 2020-03-09 | End: 2020-03-09

## 2020-02-26 RX ORDER — POLYMYXIN B SULFATE AND TRIMETHOPRIM 1; 10000 MG/ML; [USP'U]/ML
1 SOLUTION OPHTHALMIC SEE ADMIN INSTRUCTIONS
Status: CANCELLED | OUTPATIENT
Start: 2020-03-09 | End: 2020-03-16

## 2020-02-26 RX ORDER — PREDNISOLONE ACETATE 10 MG/ML
1 SUSPENSION/ DROPS OPHTHALMIC SEE ADMIN INSTRUCTIONS
Status: CANCELLED | OUTPATIENT
Start: 2020-03-09

## 2020-02-26 RX ORDER — TETRACAINE HYDROCHLORIDE 5 MG/ML
1 SOLUTION OPHTHALMIC SEE ADMIN INSTRUCTIONS
Status: CANCELLED | OUTPATIENT
Start: 2020-03-09

## 2020-03-04 RX ORDER — METOPROLOL SUCCINATE 25 MG/1
25 TABLET, EXTENDED RELEASE ORAL DAILY
COMMUNITY

## 2020-03-04 RX ORDER — FLUOXETINE HYDROCHLORIDE 20 MG/1
20 CAPSULE ORAL DAILY
COMMUNITY

## 2020-03-04 RX ORDER — POTASSIUM CHLORIDE 750 MG/1
10 TABLET, EXTENDED RELEASE ORAL DAILY
COMMUNITY
End: 2021-02-17 | Stop reason: HOSPADM

## 2020-03-04 RX ORDER — FLUTICASONE PROPIONATE 50 MCG
2 SPRAY, SUSPENSION (ML) NASAL DAILY
COMMUNITY

## 2020-03-04 RX ORDER — PANTOPRAZOLE SODIUM 40 MG/1
40 TABLET, DELAYED RELEASE ORAL NIGHTLY
COMMUNITY

## 2020-03-05 ENCOUNTER — APPOINTMENT (OUTPATIENT)
Dept: PREADMISSION TESTING | Facility: HOSPITAL | Age: 55
End: 2020-03-05

## 2020-03-05 LAB
ANION GAP SERPL CALCULATED.3IONS-SCNC: 10.8 MMOL/L (ref 5–15)
BUN BLD-MCNC: 13 MG/DL (ref 6–20)
BUN/CREAT SERPL: 16.7 (ref 7–25)
CALCIUM SPEC-SCNC: 9.2 MG/DL (ref 8.6–10.5)
CHLORIDE SERPL-SCNC: 101 MMOL/L (ref 98–107)
CO2 SERPL-SCNC: 29.2 MMOL/L (ref 22–29)
CREAT BLD-MCNC: 0.78 MG/DL (ref 0.76–1.27)
DEPRECATED RDW RBC AUTO: 47.8 FL (ref 37–54)
ERYTHROCYTE [DISTWIDTH] IN BLOOD BY AUTOMATED COUNT: 12.9 % (ref 12.3–15.4)
GFR SERPL CREATININE-BSD FRML MDRD: 104 ML/MIN/1.73
GLUCOSE BLD-MCNC: 81 MG/DL (ref 65–99)
HCT VFR BLD AUTO: 39.3 % (ref 37.5–51)
HGB BLD-MCNC: 13.3 G/DL (ref 13–17.7)
MCH RBC QN AUTO: 33.9 PG (ref 26.6–33)
MCHC RBC AUTO-ENTMCNC: 33.8 G/DL (ref 31.5–35.7)
MCV RBC AUTO: 100.3 FL (ref 79–97)
PLATELET # BLD AUTO: 163 10*3/MM3 (ref 140–450)
PMV BLD AUTO: 10.4 FL (ref 6–12)
POTASSIUM BLD-SCNC: 3.9 MMOL/L (ref 3.5–5.2)
RBC # BLD AUTO: 3.92 10*6/MM3 (ref 4.14–5.8)
SODIUM BLD-SCNC: 141 MMOL/L (ref 136–145)
WBC NRBC COR # BLD: 4.08 10*3/MM3 (ref 3.4–10.8)

## 2020-03-05 PROCEDURE — 93005 ELECTROCARDIOGRAM TRACING: CPT

## 2020-03-05 PROCEDURE — 85027 COMPLETE CBC AUTOMATED: CPT | Performed by: OPHTHALMOLOGY

## 2020-03-05 PROCEDURE — 36415 COLL VENOUS BLD VENIPUNCTURE: CPT

## 2020-03-05 PROCEDURE — 80048 BASIC METABOLIC PNL TOTAL CA: CPT | Performed by: OPHTHALMOLOGY

## 2020-03-05 NOTE — PAT
Phone health history obtained on patient by Sanaz Dickey RN with staff from [facility] ([phone]).  Patient presented to [facility] today for lab work/testing in preparation for general anesthesia.  Patient accompanied by staff from [facility] who provided hospital with patient's guardianship papers, listing his mother as the appointment Guardian.  Staff from [facility] verbalized that Patt would not be able to accompany patient to [facility] on 03-09-20.  RN phoned [facility] and obtained patient's appointment guardians contact number.  Staff at [facility] verbalized that Patt Burleson typically goes to [facility] in [place], Monday-Friday until 1300 and that staff may be able to reach her there to obtain consent for patient's procedure 03-09-20.     RN phoned Patt Burleson ([birth year]) at [facility] in [place] at 1255.  Verbal consent via phone conversation for upcoming procedure scheduled for patient 03-09-20, to be done with general anesthesia was obtained with self and Shaista Swann RN as witness.  Patient's mother/guardian verbalized understanding of procedure and of anesthesia type, and verbalized that she did not have any questions.

## 2020-03-09 ENCOUNTER — ANESTHESIA (OUTPATIENT)
Dept: PERIOP | Facility: HOSPITAL | Age: 55
End: 2020-03-09

## 2020-03-09 ENCOUNTER — HOSPITAL ENCOUNTER (OUTPATIENT)
Facility: HOSPITAL | Age: 55
Setting detail: HOSPITAL OUTPATIENT SURGERY
Discharge: HOME | End: 2020-03-09
Attending: OPHTHALMOLOGY | Admitting: OPHTHALMOLOGY

## 2020-03-09 ENCOUNTER — ANESTHESIA EVENT (OUTPATIENT)
Dept: PERIOP | Facility: HOSPITAL | Age: 55
End: 2020-03-09

## 2020-03-09 VITALS
OXYGEN SATURATION: 92 % | HEIGHT: 60 IN | RESPIRATION RATE: 16 BRPM | DIASTOLIC BLOOD PRESSURE: 71 MMHG | WEIGHT: 130 LBS | HEART RATE: 82 BPM | BODY MASS INDEX: 25.52 KG/M2 | TEMPERATURE: 97.5 F | SYSTOLIC BLOOD PRESSURE: 100 MMHG

## 2020-03-09 DIAGNOSIS — Q12.0 POSTERIOR POLAR CATARACT OF BOTH EYES: ICD-10-CM

## 2020-03-09 PROCEDURE — 25010000002 PROPOFOL 10 MG/ML EMULSION: Performed by: NURSE ANESTHETIST, CERTIFIED REGISTERED

## 2020-03-09 PROCEDURE — V2632 POST CHMBR INTRAOCULAR LENS: HCPCS | Performed by: OPHTHALMOLOGY

## 2020-03-09 PROCEDURE — 25010000002 EPINEPHRINE PER 0.1 MG: Performed by: OPHTHALMOLOGY

## 2020-03-09 DEVICE — LENS ACRYSOF IQ 6X13MM SA60WF 21.5: Type: IMPLANTABLE DEVICE | Site: POSTERIOR CHAMBER | Status: FUNCTIONAL

## 2020-03-09 RX ORDER — SODIUM CHLORIDE, SODIUM LACTATE, POTASSIUM CHLORIDE, CALCIUM CHLORIDE 600; 310; 30; 20 MG/100ML; MG/100ML; MG/100ML; MG/100ML
1000 INJECTION, SOLUTION INTRAVENOUS CONTINUOUS
Status: DISCONTINUED | OUTPATIENT
Start: 2020-03-09 | End: 2020-03-09 | Stop reason: HOSPADM

## 2020-03-09 RX ORDER — BUPIVACAINE HCL/0.9 % NACL/PF 0.125 %
PLASTIC BAG, INJECTION (ML) EPIDURAL AS NEEDED
Status: DISCONTINUED | OUTPATIENT
Start: 2020-03-09 | End: 2020-03-09 | Stop reason: SURG

## 2020-03-09 RX ORDER — ONDANSETRON 2 MG/ML
4 INJECTION INTRAMUSCULAR; INTRAVENOUS ONCE AS NEEDED
Status: DISCONTINUED | OUTPATIENT
Start: 2020-03-09 | End: 2020-03-09 | Stop reason: HOSPADM

## 2020-03-09 RX ORDER — PREDNISOLONE ACETATE 10 MG/ML
SUSPENSION/ DROPS OPHTHALMIC AS NEEDED
Status: DISCONTINUED | OUTPATIENT
Start: 2020-03-09 | End: 2020-03-09 | Stop reason: HOSPADM

## 2020-03-09 RX ORDER — PREDNISOLONE ACETATE 10 MG/ML
1 SUSPENSION/ DROPS OPHTHALMIC SEE ADMIN INSTRUCTIONS
Status: DISCONTINUED | OUTPATIENT
Start: 2020-03-09 | End: 2020-03-09 | Stop reason: HOSPADM

## 2020-03-09 RX ORDER — MORPHINE SULFATE 2 MG/ML
2 INJECTION, SOLUTION INTRAMUSCULAR; INTRAVENOUS
Status: DISCONTINUED | OUTPATIENT
Start: 2020-03-09 | End: 2020-03-09 | Stop reason: HOSPADM

## 2020-03-09 RX ORDER — TETRACAINE HYDROCHLORIDE 5 MG/ML
1 SOLUTION OPHTHALMIC SEE ADMIN INSTRUCTIONS
Status: COMPLETED | OUTPATIENT
Start: 2020-03-09 | End: 2020-03-09

## 2020-03-09 RX ORDER — TETRACAINE HYDROCHLORIDE 5 MG/ML
SOLUTION OPHTHALMIC AS NEEDED
Status: DISCONTINUED | OUTPATIENT
Start: 2020-03-09 | End: 2020-03-09 | Stop reason: HOSPADM

## 2020-03-09 RX ORDER — MEPERIDINE HYDROCHLORIDE 25 MG/ML
25 INJECTION INTRAMUSCULAR; INTRAVENOUS; SUBCUTANEOUS ONCE
Status: DISCONTINUED | OUTPATIENT
Start: 2020-03-09 | End: 2020-03-09 | Stop reason: HOSPADM

## 2020-03-09 RX ORDER — PROPOFOL 10 MG/ML
VIAL (ML) INTRAVENOUS AS NEEDED
Status: DISCONTINUED | OUTPATIENT
Start: 2020-03-09 | End: 2020-03-09 | Stop reason: SURG

## 2020-03-09 RX ORDER — BALANCED SALT SOLUTION 6.4; .75; .48; .3; 3.9; 1.7 MG/ML; MG/ML; MG/ML; MG/ML; MG/ML; MG/ML
SOLUTION OPHTHALMIC AS NEEDED
Status: DISCONTINUED | OUTPATIENT
Start: 2020-03-09 | End: 2020-03-09 | Stop reason: HOSPADM

## 2020-03-09 RX ORDER — POLYMYXIN B SULFATE AND TRIMETHOPRIM 1; 10000 MG/ML; [USP'U]/ML
SOLUTION OPHTHALMIC AS NEEDED
Status: DISCONTINUED | OUTPATIENT
Start: 2020-03-09 | End: 2020-03-09 | Stop reason: HOSPADM

## 2020-03-09 RX ORDER — LIDOCAINE HYDROCHLORIDE 20 MG/ML
INJECTION, SOLUTION INTRAVENOUS AS NEEDED
Status: DISCONTINUED | OUTPATIENT
Start: 2020-03-09 | End: 2020-03-09 | Stop reason: SURG

## 2020-03-09 RX ORDER — PHENYLEPHRINE HYDROCHLORIDE 100 MG/ML
1 SOLUTION/ DROPS OPHTHALMIC
Status: COMPLETED | OUTPATIENT
Start: 2020-03-09 | End: 2020-03-09

## 2020-03-09 RX ORDER — POLYMYXIN B SULFATE AND TRIMETHOPRIM 1; 10000 MG/ML; [USP'U]/ML
1 SOLUTION OPHTHALMIC SEE ADMIN INSTRUCTIONS
Status: DISCONTINUED | OUTPATIENT
Start: 2020-03-09 | End: 2020-03-09 | Stop reason: HOSPADM

## 2020-03-09 RX ORDER — CYCLOPENTOLATE HYDROCHLORIDE 20 MG/ML
1 SOLUTION/ DROPS OPHTHALMIC
Status: COMPLETED | OUTPATIENT
Start: 2020-03-09 | End: 2020-03-09

## 2020-03-09 RX ADMIN — CYCLOPENTOLATE HYDROCHLORIDE 1 DROP: 20 SOLUTION/ DROPS OPHTHALMIC at 09:02

## 2020-03-09 RX ADMIN — CYCLOPENTOLATE HYDROCHLORIDE 1 DROP: 20 SOLUTION/ DROPS OPHTHALMIC at 08:57

## 2020-03-09 RX ADMIN — Medication 200 MCG: at 10:24

## 2020-03-09 RX ADMIN — LIDOCAINE HYDROCHLORIDE 60 MG: 20 INJECTION, SOLUTION INTRAVENOUS at 10:19

## 2020-03-09 RX ADMIN — PROPOFOL 200 MG: 10 INJECTION, EMULSION INTRAVENOUS at 10:19

## 2020-03-09 RX ADMIN — EPHEDRINE SULFATE 25 MG: 50 INJECTION INTRAMUSCULAR; INTRAVENOUS; SUBCUTANEOUS at 10:22

## 2020-03-09 RX ADMIN — TETRACAINE HYDROCHLORIDE 1 DROP: 5 SOLUTION OPHTHALMIC at 08:51

## 2020-03-09 RX ADMIN — CYCLOPENTOLATE HYDROCHLORIDE 1 DROP: 20 SOLUTION/ DROPS OPHTHALMIC at 08:52

## 2020-03-09 RX ADMIN — TETRACAINE HYDROCHLORIDE 1 DROP: 5 SOLUTION OPHTHALMIC at 08:50

## 2020-03-09 RX ADMIN — PHENYLEPHRINE HYDROCHLORIDE 1 DROP: 100 SOLUTION/ DROPS OPHTHALMIC at 08:52

## 2020-03-09 RX ADMIN — SODIUM CHLORIDE, POTASSIUM CHLORIDE, SODIUM LACTATE AND CALCIUM CHLORIDE 1000 ML: 600; 310; 30; 20 INJECTION, SOLUTION INTRAVENOUS at 09:05

## 2020-03-09 RX ADMIN — PHENYLEPHRINE HYDROCHLORIDE 1 DROP: 100 SOLUTION/ DROPS OPHTHALMIC at 09:02

## 2020-03-09 RX ADMIN — SODIUM CHLORIDE, POTASSIUM CHLORIDE, SODIUM LACTATE AND CALCIUM CHLORIDE: 600; 310; 30; 20 INJECTION, SOLUTION INTRAVENOUS at 10:54

## 2020-03-09 RX ADMIN — PHENYLEPHRINE HYDROCHLORIDE 1 DROP: 100 SOLUTION/ DROPS OPHTHALMIC at 08:57

## 2020-03-09 RX ADMIN — TETRACAINE HYDROCHLORIDE 1 DROP: 5 SOLUTION OPHTHALMIC at 08:49

## 2020-03-09 RX ADMIN — EPHEDRINE SULFATE 25 MG: 50 INJECTION INTRAMUSCULAR; INTRAVENOUS; SUBCUTANEOUS at 10:24

## 2020-03-09 NOTE — ANESTHESIA PREPROCEDURE EVALUATION
Anesthesia Evaluation     Patient summary reviewed and Nursing notes reviewed   no history of anesthetic complications:  NPO Solid Status: > 8 hours  NPO Liquid Status: > 8 hours           Airway   Mallampati: I  TM distance: >3 FB  Neck ROM: full  no difficulty expected  Dental - normal exam     Pulmonary - negative pulmonary ROS and normal exam   Cardiovascular - negative cardio ROS and normal exam        Neuro/Psych- negative ROS  GI/Hepatic/Renal/Endo    (+)  GERD,      Musculoskeletal (-) negative ROS    Abdominal    Substance History - negative use     OB/GYN negative ob/gyn ROS         Other - negative ROS                       Anesthesia Plan    ASA 2     general     intravenous induction     Anesthetic plan, all risks, benefits, and alternatives have been provided, discussed and informed consent has been obtained with: patient.

## 2020-03-09 NOTE — DISCHARGE INSTRUCTIONS
[facility] [address] [place] [address]  [place] [address]  PH: [phone]  FAX: [phone]    Post - Operative Instructions for Dr. Peraza's Cataract Patients    1.  Use your drops as prescribed, wait 5 minutes between each drop.  2.  Securely tape the protective shield over the operated eye at bedtime for the FIRST week only.  3.  Take your regular non-eye medications and continue any eye medications for the  non-operative eye.  4.  For the first week, avoid bending or stooping and lifting anything heavier than 5 pounds.  Also, avoid any blow to the head or eye.  Avoid getting dirty water in the eye.  Avoid sleeping on the side of the operated eye or face.  5.  No driving until you are told to by your physician.  6.  If significant eye pain is not relieved by medication, or you have increased redness, swelling, pain or loss of vision or any symptoms you are unsure of call Dr. Peraza's office at [phone].    You should expect:    Slight Discomfort  Burning When Using Eye Drops  Blurred Vision and Dilated Pupil  Mild Redness  You Will Not Be Able To Read  Your Glasses May Not Work  Start the following eyedrops today as soon as you are home:     Pred Forte 1% instill one drop every 4 hours  Polytrim instill one drop every 4 hours  Polycin ointment at bedtime    Wait 5 mins between drops.     You may only remove the eye shield to administer eye medications (place shield back over eye after each medication administration); otherwise keep shield on at all times until office visit tomorrow.  Start the following eyedrops today as soon as you are home:     Pred Forte 1% instill one drop every 4 hours  Polytrim instill one drop every 4 hours    Wait 5 mins between drops.     You may only remove the eye shield to administer eye medications (place shield back over eye after each medication administration); otherwise keep shield on at all times until office visit tomorrow.

## 2020-03-09 NOTE — ANESTHESIA PROCEDURE NOTES
Airway  Urgency: elective    Date/Time: 3/9/2020 10:19 AM  Airway not difficult    General Information and Staff    Patient location during procedure: OR  CRNA: Allan Vences CRNA    Indications and Patient Condition  Indications for airway management: airway protection    Preoxygenated: yes  Mask difficulty assessment: 0 - not attempted    Final Airway Details  Final airway type: supraglottic airway      Successful airway: classic  Size 4    Number of attempts at approach: 1  Assessment: lips, teeth, and gum same as pre-op and atraumatic intubation    Additional Comments  Airway pressure leak at <20cm/h20

## 2020-03-09 NOTE — OP NOTE
PROCEDURE NOTE      Date of Procedure: 3/9/2020  Patient Name:  Bossman Luna  MRN: [id number]  YOB: 1965      PREOPERATIVE DIAGNOSIS:  Visually significant combined form of senile cataract of the Left  eye interfering with activities of daily living.    INDICATIONS FOR THE PROCEDURE:  Visually significant combined form of senile cataract of the Left eye interfering with activities of daily living.    POSTOPERATIVE DIAGNOSIS:  Visually significant combined form of senile cataract of the Left eye interfering with activities of daily living. Floppy Iris.    SURGEON:  Winifred Peraza M.D.    NAME OF PROCEDURE:   Left cataract extraction with posterior chamber intraocular lens implant.  Lens used: +   Implant Name Type Inv. Item Serial No.  Lot No. LRB No. Used   LENS ACRYSOF IQ 6X13MM SA60WF 21.5 - [id number] - [id number] Implant LENS ACRYSOF IQ 6X13MM SA60WF 21.5 [id number] AMEE NA Left 1     CDE: 3.57    ANESTHESIA:  Laryngeal Mask Anaesthesia    COMPLICATIONS:  None.    DETAILS OF THE PROCEDURE:  After receiving appropriate informed consent and confirming and marking the eye to be operated upon, the patient was wheeled into the operating room. Under monitored anesthesia care the patient received general anaesthesia. The patient was then prepped and draped in a standard sterile ophthalmic fashion and a lid speculum was inserted into the Left eye. Clau scissors were used to reflect superior conjunctiva off the limbus followed by bipolar cautery to achieve haemostasis on the exposed scleral bed. A 0.8 mm metal blade was then used to make two limbal paracenteses, and the anterior chamber was reformed with Viscoat.  A 2.4 mm metal keratome was then used to make a superior scleral tunneled incision.  A cystotome was used to puncture the anterior capsule and initiate a capsular flap.  Utrata forceps were used to complete a continuous curvilinear capsulorrhexis.  BSS on a Guerrero  hydrodissection cannula was then used to hydrodissect and hydrodelineate the nucleus. At this point the iris prolapsed out of the main wound  And had to be carefully reposited after softening the anterior chamber.The nucleus was then phacoemulsified using a stop and chop technique.  CDE was 3.57 %. Remaining cortex was removed with bimanual I/A.  Posterior capsular polish was performed.  The capsular bag was then reformed with Provisc and a   Implant Name Type Inv. Item Serial No.  Lot No. LRB No. Used   LENS ACRYSOF IQ 6X13MM SA60WF 21.5 - [id number] - [id number] Implant LENS ACRYSOF IQ 6X13MM SA60WF 21.5 [id number] 070 AMEE NA Left 1    lens implant was placed in the bag without event.  A 10.0 nylon suture in an infinity configuration was used to close the main wound and the knot was rotated and buried. The conjunctiva was cauterized closed. The Provisc was removed with bimanual irrigation and aspiration and the anterior chamber reformed with BSS.  The wounds were hydrated as necessary to maintain a water tight anterior chamber. Intracameral Ulssorfqnexa6xu/0.1ml was administered and 5% povidone iodine solution was placed on the ocular surface. At the conclusion of the procedure, the lid speculum was removed and patient undraped. A drop of Polytrim, Pred Forte 1% and Ak Poly Leslie ointment and a  shield were placed over the Left eye.     The patient was then extubated and returned to recovery in good condition having tolerated the procedure well.        Winifred Peraza MD

## 2020-03-09 NOTE — ANESTHESIA POSTPROCEDURE EVALUATION
Patient: Bossman Luna    Procedure Summary     Date:  03/09/20 Room / Location:  [facility] OR 1 /  [facility] OR    Anesthesia Start:  1009 Anesthesia Stop:  1126    Procedure:  CATARACT PHACO EXTRACTION WITH INTRAOCULAR LENS IMPLANT LEFT (Left Eye) Diagnosis:       Posterior subcapsular polar age-related cataract      (Posterior subcapsular polar age-related cataract [H25.049])    Surgeon:  Winifred Peraza MD Provider:  Allan Vences CRNA    Anesthesia Type:  MAC ASA Status:  2          Anesthesia Type: MAC    Vitals  Vitals Value Taken Time   /45 3/9/2020 12:22 PM   Temp 97.5 °F (36.4 °C) 3/9/2020 12:22 PM   Pulse 85 3/9/2020 12:22 PM   Resp 12 3/9/2020 12:22 PM   SpO2 93 % 3/9/2020 12:22 PM           Post Anesthesia Care and Evaluation    Patient location during evaluation: PACU  Patient participation: complete - patient participated  Level of consciousness: sleepy but conscious  Pain score: 3  Pain management: adequate  Airway patency: patent  Anesthetic complications: No anesthetic complications  PONV Status: controlled  Cardiovascular status: acceptable and stable  Respiratory status: acceptable and face mask  Hydration status: acceptable

## 2020-03-27 ENCOUNTER — LAB REQUISITION (OUTPATIENT)
Dept: LAB | Facility: HOSPITAL | Age: 55
End: 2020-03-27

## 2020-03-27 DIAGNOSIS — Z87.440 PERSONAL HISTORY OF URINARY (TRACT) INFECTIONS: ICD-10-CM

## 2020-03-27 LAB
BACTERIA UR QL AUTO: ABNORMAL /HPF
BILIRUB UR QL STRIP: NEGATIVE
CLARITY UR: ABNORMAL
COLOR UR: YELLOW
GLUCOSE UR STRIP-MCNC: NEGATIVE MG/DL
HGB UR QL STRIP.AUTO: ABNORMAL
HYALINE CASTS UR QL AUTO: ABNORMAL /LPF
KETONES UR QL STRIP: NEGATIVE
LEUKOCYTE ESTERASE UR QL STRIP.AUTO: ABNORMAL
NITRITE UR QL STRIP: NEGATIVE
PH UR STRIP.AUTO: <=5 [PH] (ref 5–8)
PROT UR QL STRIP: ABNORMAL
RBC # UR: ABNORMAL /HPF
REF LAB TEST METHOD: ABNORMAL
SP GR UR STRIP: 1.02 (ref 1–1.03)
SQUAMOUS #/AREA URNS HPF: ABNORMAL /HPF
UROBILINOGEN UR QL STRIP: ABNORMAL
WBC UR QL AUTO: ABNORMAL /HPF

## 2020-03-27 PROCEDURE — 87186 SC STD MICRODIL/AGAR DIL: CPT | Performed by: NURSE PRACTITIONER

## 2020-03-27 PROCEDURE — 87086 URINE CULTURE/COLONY COUNT: CPT | Performed by: NURSE PRACTITIONER

## 2020-03-27 PROCEDURE — 87088 URINE BACTERIA CULTURE: CPT | Performed by: NURSE PRACTITIONER

## 2020-03-27 PROCEDURE — 81001 URINALYSIS AUTO W/SCOPE: CPT | Performed by: NURSE PRACTITIONER

## 2020-03-29 LAB — BACTERIA SPEC AEROBE CULT: ABNORMAL

## 2020-07-27 ENCOUNTER — LAB REQUISITION (OUTPATIENT)
Dept: LAB | Facility: HOSPITAL | Age: 55
End: 2020-07-27

## 2020-07-27 DIAGNOSIS — Z87.440 PERSONAL HISTORY OF URINARY (TRACT) INFECTIONS: ICD-10-CM

## 2020-07-27 LAB
BACTERIA UR QL AUTO: ABNORMAL /HPF
BILIRUB UR QL STRIP: NEGATIVE
CLARITY UR: ABNORMAL
COLOR UR: ABNORMAL
GLUCOSE UR STRIP-MCNC: NEGATIVE MG/DL
HGB UR QL STRIP.AUTO: ABNORMAL
HYALINE CASTS UR QL AUTO: ABNORMAL /LPF
KETONES UR QL STRIP: NEGATIVE
LEUKOCYTE ESTERASE UR QL STRIP.AUTO: ABNORMAL
MUCOUS THREADS URNS QL MICRO: ABNORMAL /HPF
NITRITE UR QL STRIP: NEGATIVE
PH UR STRIP.AUTO: 8.5 [PH] (ref 5–8)
PROT UR QL STRIP: ABNORMAL
RBC # UR: ABNORMAL /HPF
REF LAB TEST METHOD: ABNORMAL
SP GR UR STRIP: 1.01 (ref 1–1.03)
SQUAMOUS #/AREA URNS HPF: ABNORMAL /HPF
UROBILINOGEN UR QL STRIP: ABNORMAL
WBC UR QL AUTO: ABNORMAL /HPF

## 2020-07-27 PROCEDURE — 87086 URINE CULTURE/COLONY COUNT: CPT | Performed by: NURSE PRACTITIONER

## 2020-07-27 PROCEDURE — 87088 URINE BACTERIA CULTURE: CPT | Performed by: NURSE PRACTITIONER

## 2020-07-27 PROCEDURE — 87186 SC STD MICRODIL/AGAR DIL: CPT | Performed by: NURSE PRACTITIONER

## 2020-07-27 PROCEDURE — 81001 URINALYSIS AUTO W/SCOPE: CPT | Performed by: NURSE PRACTITIONER

## 2020-07-30 LAB — BACTERIA SPEC AEROBE CULT: ABNORMAL

## 2021-02-10 ENCOUNTER — APPOINTMENT (OUTPATIENT)
Dept: GENERAL RADIOLOGY | Facility: HOSPITAL | Age: 56
End: 2021-02-10

## 2021-02-10 ENCOUNTER — HOSPITAL ENCOUNTER (INPATIENT)
Facility: HOSPITAL | Age: 56
LOS: 7 days | Discharge: HOME HEALTH | End: 2021-02-17
Attending: EMERGENCY MEDICINE | Admitting: FAMILY MEDICINE

## 2021-02-10 DIAGNOSIS — S72.001A CLOSED FRACTURE OF RIGHT HIP, INITIAL ENCOUNTER (HCC): Primary | ICD-10-CM

## 2021-02-10 DIAGNOSIS — S72.009A: ICD-10-CM

## 2021-02-10 LAB
ALBUMIN SERPL-MCNC: 3.7 G/DL (ref 3.5–5.2)
ALBUMIN/GLOB SERPL: 1.6 G/DL
ALP SERPL-CCNC: 59 U/L (ref 39–117)
ALT SERPL W P-5'-P-CCNC: 16 U/L (ref 1–41)
ANION GAP SERPL CALCULATED.3IONS-SCNC: 7.3 MMOL/L (ref 5–15)
APTT PPP: 35.9 SECONDS (ref 24.5–37.2)
AST SERPL-CCNC: 20 U/L (ref 1–40)
BASOPHILS # BLD AUTO: 0.03 10*3/MM3 (ref 0–0.2)
BASOPHILS NFR BLD AUTO: 0.4 % (ref 0–1.5)
BILIRUB SERPL-MCNC: 0.3 MG/DL (ref 0–1.2)
BUN SERPL-MCNC: 15 MG/DL (ref 6–20)
BUN/CREAT SERPL: 19.2 (ref 7–25)
CALCIUM SPEC-SCNC: 8.3 MG/DL (ref 8.6–10.5)
CHLORIDE SERPL-SCNC: 94 MMOL/L (ref 98–107)
CO2 SERPL-SCNC: 29.7 MMOL/L (ref 22–29)
CREAT SERPL-MCNC: 0.78 MG/DL (ref 0.76–1.27)
DEPRECATED RDW RBC AUTO: 48.8 FL (ref 37–54)
EOSINOPHIL # BLD AUTO: 0.07 10*3/MM3 (ref 0–0.4)
EOSINOPHIL NFR BLD AUTO: 1 % (ref 0.3–6.2)
ERYTHROCYTE [DISTWIDTH] IN BLOOD BY AUTOMATED COUNT: 13.2 % (ref 12.3–15.4)
GFR SERPL CREATININE-BSD FRML MDRD: 104 ML/MIN/1.73
GLOBULIN UR ELPH-MCNC: 2.3 GM/DL
GLUCOSE SERPL-MCNC: 105 MG/DL (ref 65–99)
HCT VFR BLD AUTO: 37.2 % (ref 37.5–51)
HGB BLD-MCNC: 12.6 G/DL (ref 13–17.7)
IMM GRANULOCYTES # BLD AUTO: 0.03 10*3/MM3 (ref 0–0.05)
IMM GRANULOCYTES NFR BLD AUTO: 0.4 % (ref 0–0.5)
INR PPP: 1.1 (ref 0.9–1.1)
LYMPHOCYTES # BLD AUTO: 0.74 10*3/MM3 (ref 0.7–3.1)
LYMPHOCYTES NFR BLD AUTO: 10.2 % (ref 19.6–45.3)
MCH RBC QN AUTO: 33.9 PG (ref 26.6–33)
MCHC RBC AUTO-ENTMCNC: 33.9 G/DL (ref 31.5–35.7)
MCV RBC AUTO: 100 FL (ref 79–97)
MONOCYTES # BLD AUTO: 0.5 10*3/MM3 (ref 0.1–0.9)
MONOCYTES NFR BLD AUTO: 6.9 % (ref 5–12)
NEUTROPHILS NFR BLD AUTO: 5.86 10*3/MM3 (ref 1.7–7)
NEUTROPHILS NFR BLD AUTO: 81.1 % (ref 42.7–76)
NRBC BLD AUTO-RTO: 0 /100 WBC (ref 0–0.2)
PLATELET # BLD AUTO: 156 10*3/MM3 (ref 140–450)
PMV BLD AUTO: 9.7 FL (ref 6–12)
POTASSIUM SERPL-SCNC: 3.4 MMOL/L (ref 3.5–5.2)
PROT SERPL-MCNC: 6 G/DL (ref 6–8.5)
PROTHROMBIN TIME: 14.7 SECONDS (ref 12–15.1)
RBC # BLD AUTO: 3.72 10*6/MM3 (ref 4.14–5.8)
SARS-COV-2 RNA PNL SPEC NAA+PROBE: NOT DETECTED
SODIUM SERPL-SCNC: 131 MMOL/L (ref 136–145)
WBC # BLD AUTO: 7.23 10*3/MM3 (ref 3.4–10.8)

## 2021-02-10 PROCEDURE — 99283 EMERGENCY DEPT VISIT LOW MDM: CPT

## 2021-02-10 PROCEDURE — 93005 ELECTROCARDIOGRAM TRACING: CPT | Performed by: NURSE PRACTITIONER

## 2021-02-10 PROCEDURE — 80053 COMPREHEN METABOLIC PANEL: CPT | Performed by: NURSE PRACTITIONER

## 2021-02-10 PROCEDURE — 85610 PROTHROMBIN TIME: CPT | Performed by: NURSE PRACTITIONER

## 2021-02-10 PROCEDURE — 73521 X-RAY EXAM HIPS BI 2 VIEWS: CPT

## 2021-02-10 PROCEDURE — 25010000002 ONDANSETRON PER 1 MG: Performed by: NURSE PRACTITIONER

## 2021-02-10 PROCEDURE — 85730 THROMBOPLASTIN TIME PARTIAL: CPT | Performed by: NURSE PRACTITIONER

## 2021-02-10 PROCEDURE — 25010000002 FENTANYL CITRATE (PF) 100 MCG/2ML SOLUTION: Performed by: NURSE PRACTITIONER

## 2021-02-10 PROCEDURE — 87635 SARS-COV-2 COVID-19 AMP PRB: CPT | Performed by: NURSE PRACTITIONER

## 2021-02-10 PROCEDURE — 85025 COMPLETE CBC W/AUTO DIFF WBC: CPT | Performed by: NURSE PRACTITIONER

## 2021-02-10 RX ORDER — SODIUM CHLORIDE 0.9 % (FLUSH) 0.9 %
10 SYRINGE (ML) INJECTION AS NEEDED
Status: DISCONTINUED | OUTPATIENT
Start: 2021-02-10 | End: 2021-02-17 | Stop reason: HOSPADM

## 2021-02-10 RX ORDER — FENTANYL CITRATE 50 UG/ML
25 INJECTION, SOLUTION INTRAMUSCULAR; INTRAVENOUS ONCE
Status: COMPLETED | OUTPATIENT
Start: 2021-02-10 | End: 2021-02-10

## 2021-02-10 RX ORDER — ONDANSETRON 2 MG/ML
4 INJECTION INTRAMUSCULAR; INTRAVENOUS ONCE
Status: COMPLETED | OUTPATIENT
Start: 2021-02-10 | End: 2021-02-10

## 2021-02-10 RX ADMIN — ONDANSETRON 4 MG: 2 INJECTION INTRAMUSCULAR; INTRAVENOUS at 21:35

## 2021-02-10 RX ADMIN — FENTANYL CITRATE 25 MCG: 50 INJECTION, SOLUTION INTRAMUSCULAR; INTRAVENOUS at 21:37

## 2021-02-11 ENCOUNTER — APPOINTMENT (OUTPATIENT)
Dept: ULTRASOUND IMAGING | Facility: HOSPITAL | Age: 56
End: 2021-02-11

## 2021-02-11 ENCOUNTER — ANESTHESIA (OUTPATIENT)
Dept: PERIOP | Facility: HOSPITAL | Age: 56
End: 2021-02-11

## 2021-02-11 ENCOUNTER — ANESTHESIA EVENT (OUTPATIENT)
Dept: PERIOP | Facility: HOSPITAL | Age: 56
End: 2021-02-11

## 2021-02-11 PROBLEM — N31.9 NEUROGENIC BLADDER: Status: ACTIVE | Noted: 2021-02-11
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
## 2021-02-11 PROBLEM — F79 INTELLECTUAL DISABILITY: Status: ACTIVE | Noted: 2021-02-11

## 2021-02-11 PROBLEM — F41.8 ANXIETY ASSOCIATED WITH DEPRESSION: Status: ACTIVE | Noted: 2021-02-11

## 2021-02-11 PROBLEM — E03.9 HYPOTHYROIDISM (ACQUIRED): Status: ACTIVE | Noted: 2021-02-11

## 2021-02-11 PROCEDURE — 0SRR0JA REPLACEMENT OF RIGHT HIP JOINT, FEMORAL SURFACE WITH SYNTHETIC SUBSTITUTE, UNCEMENTED, OPEN APPROACH: ICD-10-PCS | Performed by: ORTHOPAEDIC SURGERY

## 2021-02-11 PROCEDURE — 25010000002 SUCCINYLCHOLINE PER 20 MG: Performed by: NURSE ANESTHETIST, CERTIFIED REGISTERED

## 2021-02-11 PROCEDURE — 99223 1ST HOSP IP/OBS HIGH 75: CPT | Performed by: EMERGENCY MEDICINE

## 2021-02-11 PROCEDURE — C1889 IMPLANT/INSERT DEVICE, NOC: HCPCS | Performed by: ORTHOPAEDIC SURGERY

## 2021-02-11 PROCEDURE — C1776 JOINT DEVICE (IMPLANTABLE): HCPCS | Performed by: ORTHOPAEDIC SURGERY

## 2021-02-11 PROCEDURE — 25010000002 HYDROMORPHONE PER 4 MG: Performed by: NURSE ANESTHETIST, CERTIFIED REGISTERED

## 2021-02-11 PROCEDURE — 25010000002 MIDAZOLAM PER 1MG: Performed by: NURSE ANESTHETIST, CERTIFIED REGISTERED

## 2021-02-11 PROCEDURE — 25010000003 CEFAZOLIN PER 500 MG: Performed by: NURSE ANESTHETIST, CERTIFIED REGISTERED

## 2021-02-11 PROCEDURE — 25010000002 ONDANSETRON PER 1 MG: Performed by: NURSE ANESTHETIST, CERTIFIED REGISTERED

## 2021-02-11 PROCEDURE — 25010000002 FENTANYL CITRATE (PF) 100 MCG/2ML SOLUTION: Performed by: NURSE ANESTHETIST, CERTIFIED REGISTERED

## 2021-02-11 PROCEDURE — 25010000003 CEFAZOLIN SODIUM-DEXTROSE 2-3 GM-%(50ML) RECONSTITUTED SOLUTION: Performed by: ORTHOPAEDIC SURGERY

## 2021-02-11 PROCEDURE — 25010000002 PROPOFOL 200 MG/20ML EMULSION: Performed by: NURSE ANESTHETIST, CERTIFIED REGISTERED

## 2021-02-11 PROCEDURE — 25010000002 MORPHINE SULFATE (PF) 2 MG/ML SOLUTION: Performed by: EMERGENCY MEDICINE

## 2021-02-11 DEVICE — CUP ACET RINGLOC BIPOL 28MM 42MM: Type: IMPLANTABLE DEVICE | Site: ACETABULUM | Status: FUNCTIONAL

## 2021-02-11 DEVICE — DEV CONTRL TISS STRATAFIX SYMM PDS PLUS VIL CT-1 45CM: Type: IMPLANTABLE DEVICE | Site: HIP | Status: FUNCTIONAL

## 2021-02-11 DEVICE — CAP PRT HIP BIPOL: Type: IMPLANTABLE DEVICE | Site: ACETABULUM | Status: FUNCTIONAL

## 2021-02-11 DEVICE — HD FEM MOD COCR 28MM STD/NK: Type: IMPLANTABLE DEVICE | Site: HIP | Status: FUNCTIONAL

## 2021-02-11 DEVICE — DEV CONTRL TISS STRATAFIX SPIRAL MNCRYL UD 3/0 PLS 60CM: Type: IMPLANTABLE DEVICE | Site: HIP | Status: FUNCTIONAL

## 2021-02-11 DEVICE — STEM FEM ECHO/BIMETRIC PROX/F/NC 10X130: Type: IMPLANTABLE DEVICE | Site: FEMUR | Status: FUNCTIONAL

## 2021-02-11 RX ORDER — BUPIVACAINE HYDROCHLORIDE 5 MG/ML
INJECTION, SOLUTION EPIDURAL; INTRACAUDAL AS NEEDED
Status: DISCONTINUED | OUTPATIENT
Start: 2021-02-11 | End: 2021-02-11 | Stop reason: SURG

## 2021-02-11 RX ORDER — DOCUSATE SODIUM 100 MG/1
100 CAPSULE, LIQUID FILLED ORAL DAILY
Status: DISCONTINUED | OUTPATIENT
Start: 2021-02-11 | End: 2021-02-17 | Stop reason: HOSPADM

## 2021-02-11 RX ORDER — CEFAZOLIN SODIUM 2 G/50ML
2 SOLUTION INTRAVENOUS EVERY 8 HOURS
Status: COMPLETED | OUTPATIENT
Start: 2021-02-11 | End: 2021-02-12

## 2021-02-11 RX ORDER — FLUOXETINE HYDROCHLORIDE 20 MG/1
20 CAPSULE ORAL DAILY
Status: DISCONTINUED | OUTPATIENT
Start: 2021-02-11 | End: 2021-02-17 | Stop reason: HOSPADM

## 2021-02-11 RX ORDER — ACETAMINOPHEN 650 MG/1
650 SUPPOSITORY RECTAL EVERY 4 HOURS PRN
Status: DISCONTINUED | OUTPATIENT
Start: 2021-02-11 | End: 2021-02-17 | Stop reason: HOSPADM

## 2021-02-11 RX ORDER — LOSARTAN POTASSIUM 25 MG/1
25 TABLET ORAL NIGHTLY
COMMUNITY
End: 2021-02-17 | Stop reason: HOSPADM

## 2021-02-11 RX ORDER — HYDROMORPHONE HCL 110MG/55ML
PATIENT CONTROLLED ANALGESIA SYRINGE INTRAVENOUS AS NEEDED
Status: DISCONTINUED | OUTPATIENT
Start: 2021-02-11 | End: 2021-02-11 | Stop reason: SURG

## 2021-02-11 RX ORDER — BUPIVACAINE HCL/0.9 % NACL/PF 0.125 %
PLASTIC BAG, INJECTION (ML) EPIDURAL AS NEEDED
Status: DISCONTINUED | OUTPATIENT
Start: 2021-02-11 | End: 2021-02-11 | Stop reason: SURG

## 2021-02-11 RX ORDER — METOPROLOL SUCCINATE 25 MG/1
25 TABLET, EXTENDED RELEASE ORAL DAILY
Status: DISCONTINUED | OUTPATIENT
Start: 2021-02-11 | End: 2021-02-17 | Stop reason: HOSPADM

## 2021-02-11 RX ORDER — MAGNESIUM HYDROXIDE 1200 MG/15ML
LIQUID ORAL AS NEEDED
Status: DISCONTINUED | OUTPATIENT
Start: 2021-02-11 | End: 2021-02-11 | Stop reason: HOSPADM

## 2021-02-11 RX ORDER — SODIUM CHLORIDE 0.9 % (FLUSH) 0.9 %
10 SYRINGE (ML) INJECTION AS NEEDED
Status: DISCONTINUED | OUTPATIENT
Start: 2021-02-11 | End: 2021-02-17 | Stop reason: HOSPADM

## 2021-02-11 RX ORDER — ACETAMINOPHEN 160 MG/5ML
650 SOLUTION ORAL EVERY 4 HOURS PRN
Status: DISCONTINUED | OUTPATIENT
Start: 2021-02-11 | End: 2021-02-17 | Stop reason: HOSPADM

## 2021-02-11 RX ORDER — CEFAZOLIN SODIUM 1 G/3ML
INJECTION, POWDER, FOR SOLUTION INTRAMUSCULAR; INTRAVENOUS AS NEEDED
Status: DISCONTINUED | OUTPATIENT
Start: 2021-02-11 | End: 2021-02-11 | Stop reason: SURG

## 2021-02-11 RX ORDER — SODIUM CHLORIDE 0.9 % (FLUSH) 0.9 %
10 SYRINGE (ML) INJECTION EVERY 12 HOURS SCHEDULED
Status: DISCONTINUED | OUTPATIENT
Start: 2021-02-11 | End: 2021-02-17 | Stop reason: HOSPADM

## 2021-02-11 RX ORDER — PROPOFOL 10 MG/ML
INJECTION, EMULSION INTRAVENOUS AS NEEDED
Status: DISCONTINUED | OUTPATIENT
Start: 2021-02-11 | End: 2021-02-11 | Stop reason: SURG

## 2021-02-11 RX ORDER — PANTOPRAZOLE SODIUM 40 MG/1
40 TABLET, DELAYED RELEASE ORAL NIGHTLY
Status: DISCONTINUED | OUTPATIENT
Start: 2021-02-11 | End: 2021-02-17 | Stop reason: HOSPADM

## 2021-02-11 RX ORDER — CHOLECALCIFEROL (VITAMIN D3) 125 MCG
10 CAPSULE ORAL NIGHTLY
COMMUNITY

## 2021-02-11 RX ORDER — RISPERIDONE 1 MG/1
2 TABLET ORAL DAILY
Status: DISCONTINUED | OUTPATIENT
Start: 2021-02-11 | End: 2021-02-17 | Stop reason: HOSPADM

## 2021-02-11 RX ORDER — ZOLPIDEM TARTRATE 5 MG/1
10 TABLET ORAL NIGHTLY PRN
Status: DISCONTINUED | OUTPATIENT
Start: 2021-02-11 | End: 2021-02-17 | Stop reason: HOSPADM

## 2021-02-11 RX ORDER — MORPHINE SULFATE 2 MG/ML
4 INJECTION, SOLUTION INTRAMUSCULAR; INTRAVENOUS
Status: DISCONTINUED | OUTPATIENT
Start: 2021-02-11 | End: 2021-02-14

## 2021-02-11 RX ORDER — ACETAMINOPHEN 500 MG
1000 TABLET ORAL 3 TIMES DAILY
COMMUNITY

## 2021-02-11 RX ORDER — FENTANYL CITRATE 50 UG/ML
INJECTION, SOLUTION INTRAMUSCULAR; INTRAVENOUS AS NEEDED
Status: DISCONTINUED | OUTPATIENT
Start: 2021-02-11 | End: 2021-02-11 | Stop reason: SURG

## 2021-02-11 RX ORDER — FLUTICASONE PROPIONATE 50 MCG
2 SPRAY, SUSPENSION (ML) NASAL DAILY
Status: DISCONTINUED | OUTPATIENT
Start: 2021-02-11 | End: 2021-02-17 | Stop reason: HOSPADM

## 2021-02-11 RX ORDER — LEVOTHYROXINE SODIUM 88 UG/1
88 TABLET ORAL DAILY
Status: DISCONTINUED | OUTPATIENT
Start: 2021-02-11 | End: 2021-02-17 | Stop reason: HOSPADM

## 2021-02-11 RX ORDER — TAMSULOSIN HYDROCHLORIDE 0.4 MG/1
0.4 CAPSULE ORAL NIGHTLY
Status: DISCONTINUED | OUTPATIENT
Start: 2021-02-11 | End: 2021-02-17 | Stop reason: HOSPADM

## 2021-02-11 RX ORDER — RISPERIDONE 1 MG/1
4 TABLET ORAL NIGHTLY
Status: DISCONTINUED | OUTPATIENT
Start: 2021-02-11 | End: 2021-02-17 | Stop reason: HOSPADM

## 2021-02-11 RX ORDER — LIDOCAINE HYDROCHLORIDE 20 MG/ML
INJECTION, SOLUTION INTRAVENOUS AS NEEDED
Status: DISCONTINUED | OUTPATIENT
Start: 2021-02-11 | End: 2021-02-11 | Stop reason: SURG

## 2021-02-11 RX ORDER — MORPHINE SULFATE 2 MG/ML
2 INJECTION, SOLUTION INTRAMUSCULAR; INTRAVENOUS
Status: DISCONTINUED | OUTPATIENT
Start: 2021-02-11 | End: 2021-02-14

## 2021-02-11 RX ORDER — HYDROCODONE BITARTRATE AND ACETAMINOPHEN 7.5; 325 MG/1; MG/1
1 TABLET ORAL EVERY 4 HOURS PRN
Status: DISCONTINUED | OUTPATIENT
Start: 2021-02-11 | End: 2021-02-17 | Stop reason: HOSPADM

## 2021-02-11 RX ORDER — NALOXONE HCL 0.4 MG/ML
0.4 VIAL (ML) INJECTION
Status: DISCONTINUED | OUTPATIENT
Start: 2021-02-11 | End: 2021-02-17 | Stop reason: HOSPADM

## 2021-02-11 RX ORDER — EPHEDRINE SULFATE 5 MG/ML
INJECTION INTRAVENOUS AS NEEDED
Status: DISCONTINUED | OUTPATIENT
Start: 2021-02-11 | End: 2021-02-11 | Stop reason: SURG

## 2021-02-11 RX ORDER — SODIUM CHLORIDE, SODIUM LACTATE, POTASSIUM CHLORIDE, CALCIUM CHLORIDE 600; 310; 30; 20 MG/100ML; MG/100ML; MG/100ML; MG/100ML
1000 INJECTION, SOLUTION INTRAVENOUS CONTINUOUS
Status: ACTIVE | OUTPATIENT
Start: 2021-02-11 | End: 2021-02-13

## 2021-02-11 RX ORDER — MIDAZOLAM HYDROCHLORIDE 2 MG/2ML
INJECTION, SOLUTION INTRAMUSCULAR; INTRAVENOUS AS NEEDED
Status: DISCONTINUED | OUTPATIENT
Start: 2021-02-11 | End: 2021-02-11 | Stop reason: SURG

## 2021-02-11 RX ORDER — ROCURONIUM BROMIDE 10 MG/ML
INJECTION, SOLUTION INTRAVENOUS AS NEEDED
Status: DISCONTINUED | OUTPATIENT
Start: 2021-02-11 | End: 2021-02-11 | Stop reason: SURG

## 2021-02-11 RX ORDER — ONDANSETRON 2 MG/ML
INJECTION INTRAMUSCULAR; INTRAVENOUS AS NEEDED
Status: DISCONTINUED | OUTPATIENT
Start: 2021-02-11 | End: 2021-02-11 | Stop reason: SURG

## 2021-02-11 RX ORDER — MULTIPLE VITAMINS W/ MINERALS TAB 9MG-400MCG
1 TAB ORAL DAILY
COMMUNITY
End: 2021-02-17 | Stop reason: HOSPADM

## 2021-02-11 RX ORDER — CHOLECALCIFEROL (VITAMIN D3) 125 MCG
10 CAPSULE ORAL NIGHTLY
Status: DISCONTINUED | OUTPATIENT
Start: 2021-02-11 | End: 2021-02-17 | Stop reason: HOSPADM

## 2021-02-11 RX ORDER — ACETAMINOPHEN 325 MG/1
650 TABLET ORAL EVERY 4 HOURS PRN
Status: DISCONTINUED | OUTPATIENT
Start: 2021-02-11 | End: 2021-02-17 | Stop reason: HOSPADM

## 2021-02-11 RX ORDER — ONDANSETRON 2 MG/ML
4 INJECTION INTRAMUSCULAR; INTRAVENOUS EVERY 6 HOURS PRN
Status: DISCONTINUED | OUTPATIENT
Start: 2021-02-11 | End: 2021-02-17 | Stop reason: HOSPADM

## 2021-02-11 RX ORDER — SUCCINYLCHOLINE CHLORIDE 20 MG/ML
INJECTION INTRAMUSCULAR; INTRAVENOUS AS NEEDED
Status: DISCONTINUED | OUTPATIENT
Start: 2021-02-11 | End: 2021-02-11 | Stop reason: SURG

## 2021-02-11 RX ORDER — CLONAZEPAM 0.5 MG/1
0.5 TABLET ORAL EVERY 8 HOURS SCHEDULED
Status: DISCONTINUED | OUTPATIENT
Start: 2021-02-11 | End: 2021-02-17 | Stop reason: HOSPADM

## 2021-02-11 RX ORDER — BUDESONIDE 3 MG/1
3 CAPSULE, COATED PELLETS ORAL DAILY
Status: DISCONTINUED | OUTPATIENT
Start: 2021-02-11 | End: 2021-02-17 | Stop reason: HOSPADM

## 2021-02-11 RX ADMIN — LIDOCAINE HYDROCHLORIDE 40 MG: 20 INJECTION, SOLUTION INTRAVENOUS at 13:32

## 2021-02-11 RX ADMIN — Medication 100 MCG: at 14:18

## 2021-02-11 RX ADMIN — RISPERIDONE 4 MG: 1 TABLET ORAL at 21:49

## 2021-02-11 RX ADMIN — MORPHINE SULFATE 2 MG: 2 INJECTION, SOLUTION INTRAMUSCULAR; INTRAVENOUS at 08:34

## 2021-02-11 RX ADMIN — EPHEDRINE SULFATE 10 MG: 5 INJECTION INTRAVENOUS at 14:14

## 2021-02-11 RX ADMIN — EPHEDRINE SULFATE 10 MG: 5 INJECTION INTRAVENOUS at 14:16

## 2021-02-11 RX ADMIN — MORPHINE SULFATE 2 MG: 2 INJECTION, SOLUTION INTRAMUSCULAR; INTRAVENOUS at 05:29

## 2021-02-11 RX ADMIN — Medication 10 MG: at 21:48

## 2021-02-11 RX ADMIN — FLUOXETINE 20 MG: 20 CAPSULE ORAL at 08:20

## 2021-02-11 RX ADMIN — HYDROCODONE BITARTRATE AND ACETAMINOPHEN 1 TABLET: 7.5; 325 TABLET ORAL at 19:33

## 2021-02-11 RX ADMIN — LEVOTHYROXINE SODIUM 88 MCG: 88 TABLET ORAL at 08:21

## 2021-02-11 RX ADMIN — SODIUM CHLORIDE, POTASSIUM CHLORIDE, SODIUM LACTATE AND CALCIUM CHLORIDE: 600; 310; 30; 20 INJECTION, SOLUTION INTRAVENOUS at 14:00

## 2021-02-11 RX ADMIN — DOCUSATE SODIUM 100 MG: 100 CAPSULE ORAL at 08:20

## 2021-02-11 RX ADMIN — ROCURONIUM BROMIDE 40 MG: 10 INJECTION INTRAVENOUS at 13:49

## 2021-02-11 RX ADMIN — SODIUM CHLORIDE, PRESERVATIVE FREE 10 ML: 5 INJECTION INTRAVENOUS at 21:49

## 2021-02-11 RX ADMIN — CEFAZOLIN 2 G: 1 INJECTION, POWDER, FOR SOLUTION INTRAVENOUS at 13:35

## 2021-02-11 RX ADMIN — METOPROLOL SUCCINATE 25 MG: 25 TABLET, EXTENDED RELEASE ORAL at 08:20

## 2021-02-11 RX ADMIN — BUDESONIDE 3 MG: 3 CAPSULE, GELATIN COATED ORAL at 08:20

## 2021-02-11 RX ADMIN — CEFAZOLIN SODIUM 2 G: 2 SOLUTION INTRAVENOUS at 21:48

## 2021-02-11 RX ADMIN — MORPHINE SULFATE 2 MG: 2 INJECTION, SOLUTION INTRAMUSCULAR; INTRAVENOUS at 02:40

## 2021-02-11 RX ADMIN — SODIUM CHLORIDE, POTASSIUM CHLORIDE, SODIUM LACTATE AND CALCIUM CHLORIDE 1000 ML: 600; 310; 30; 20 INJECTION, SOLUTION INTRAVENOUS at 12:19

## 2021-02-11 RX ADMIN — ROCURONIUM BROMIDE 10 MG: 10 INJECTION INTRAVENOUS at 13:32

## 2021-02-11 RX ADMIN — TAMSULOSIN HYDROCHLORIDE 0.4 MG: 0.4 CAPSULE ORAL at 21:48

## 2021-02-11 RX ADMIN — FENTANYL CITRATE 100 MCG: 50 INJECTION INTRAMUSCULAR; INTRAVENOUS at 13:25

## 2021-02-11 RX ADMIN — HYDROMORPHONE HYDROCHLORIDE 0.4 MG: 2 INJECTION, SOLUTION INTRAMUSCULAR; INTRAVENOUS; SUBCUTANEOUS at 14:44

## 2021-02-11 RX ADMIN — SUCCINYLCHOLINE CHLORIDE 60 MG: 20 INJECTION, SOLUTION INTRAMUSCULAR; INTRAVENOUS at 13:32

## 2021-02-11 RX ADMIN — CLONAZEPAM 0.5 MG: 0.5 TABLET ORAL at 21:48

## 2021-02-11 RX ADMIN — BUPIVACAINE HYDROCHLORIDE 150 MG: 5 INJECTION, SOLUTION EPIDURAL; INTRACAUDAL; PERINEURAL at 15:02

## 2021-02-11 RX ADMIN — RISPERIDONE 2 MG: 1 TABLET ORAL at 08:20

## 2021-02-11 RX ADMIN — PANTOPRAZOLE SODIUM 40 MG: 40 TABLET, DELAYED RELEASE ORAL at 21:48

## 2021-02-11 RX ADMIN — EPHEDRINE SULFATE 10 MG: 5 INJECTION INTRAVENOUS at 13:50

## 2021-02-11 RX ADMIN — MIDAZOLAM HYDROCHLORIDE 2 MG: 1 INJECTION, SOLUTION INTRAMUSCULAR; INTRAVENOUS at 13:25

## 2021-02-11 RX ADMIN — PROPOFOL 50 MG: 10 INJECTION, EMULSION INTRAVENOUS at 13:32

## 2021-02-11 RX ADMIN — SUGAMMADEX 300 MG: 100 INJECTION, SOLUTION INTRAVENOUS at 14:46

## 2021-02-11 RX ADMIN — ONDANSETRON 4 MG: 2 INJECTION INTRAMUSCULAR; INTRAVENOUS at 13:51

## 2021-02-11 RX ADMIN — SODIUM CHLORIDE, PRESERVATIVE FREE 10 ML: 5 INJECTION INTRAVENOUS at 08:20

## 2021-02-11 RX ADMIN — HYDROCODONE BITARTRATE AND ACETAMINOPHEN 1 TABLET: 7.5; 325 TABLET ORAL at 23:39

## 2021-02-11 NOTE — ED PROVIDER NOTES
Subjective   History of Present Illness  This is a 55 year old developmentally delayed male who was brought in by EMS after a slip and fall. He reports right hip pain and was unable to bear weight.   Review of Systems   Constitutional: Negative.    HENT: Negative.    Eyes: Negative.    Respiratory: Negative.    Cardiovascular: Negative.    Gastrointestinal: Negative.    Genitourinary: Negative.    Musculoskeletal: Positive for arthralgias.   Skin: Negative.    Neurological: Negative.    Psychiatric/Behavioral: Negative.        Past Medical History:   Diagnosis Date   • BPH with urinary obstruction    • Cataract     bilateral   • GERD (gastroesophageal reflux disease)    • Glaucoma    • Neurogenic bladder    • Osteoporosis    • Prostate disorder    • Rash    • Thyroid disease    • Urinary retention        No Known Allergies    Past Surgical History:   Procedure Laterality Date   • CATARACT EXTRACTION W/ INTRAOCULAR LENS IMPLANT Left 3/9/2020    Procedure: CATARACT PHACO EXTRACTION WITH INTRAOCULAR LENS IMPLANT LEFT;  Surgeon: Winifred Peraza MD;  Location: [facility];  Service: Ophthalmology;  Laterality: Left;   • COLONOSCOPY     • ENDOSCOPY         Family History   Family history unknown: Yes       Social History     Socioeconomic History   • Marital status: Single     Spouse name: Not on file   • Number of children: Not on file   • Years of education: Not on file   • Highest education level: Not on file   Occupational History     Employer: UNEMPLOYED   Tobacco Use   • Smoking status: Never Smoker   • Smokeless tobacco: Never Used   Substance and Sexual Activity   • Alcohol use: No   • Drug use: No   • Sexual activity: Never           Objective   Physical Exam  Vitals signs and nursing note reviewed.   Constitutional:       Appearance: Normal appearance. He is normal weight.   Neurological:      Mental Status: He is alert.     Primary survey  Airway patent  Bilateral breath sounds  Strong radial and femoral  pulses  GCS 15    Secondary survey  general: patient appears uncomfortable, nontoxic  Head: Atraumatic, normocephalic  Eyes: Pupils equal round reactive to light, extra ocular movements are intact, vision grossly normal  ENT: No facial step-offs, no dental malocclusion  Neck: Nontender to palpation of the C-spine, full range of motion, trachea midline  Chest: Nontender to palpation  Cardiovascular: Regular rate  Lungs: Bilateral breath sounds, clear to auscultation bilaterally  Back: T and L-spines are nontender to palpation, no step offs  Abdomen: Soft, nontender, nondistended  Pelvis: Stable  Extremities: right hip tender with limited ROM due to pain.   Neuro: Sensation grossly intact to light touch in all 4 extremities        Procedures           ED Course  ED Course as of Feb 10 2206   Wed Feb 10, 2021   2154 Consult with Dr. Sagastume, will accept for ortho surgery consult. Keep NPO after midnight.     [TW]   2205 Consult Dr. Xie will accept for admission.     [TW]      ED Course User Index  [TW] Gaby Greene, DOC                                           [facility]  Number of Diagnoses or Management Options     Amount and/or Complexity of Data Reviewed  Tests in the radiology section of CPT®: ordered and reviewed  Review and summarize past medical records: yes  Discuss the patient with other providers: yes  Independent visualization of images, tracings, or specimens: yes    Risk of Complications, Morbidity, and/or Mortality  Presenting problems: moderate  Diagnostic procedures: moderate  Management options: moderate        Final diagnoses:   Closed fracture of right hip, initial encounter (CMS/[facility])            Gaby Greene APRN  02/10/21 2206

## 2021-02-11 NOTE — ANESTHESIA PROCEDURE NOTES
Peripheral Block      Patient reassessed immediately prior to procedure    Patient location during procedure: OR  Start time: 2/11/2021 2:55 PM  Stop time: 2/11/2021 3:02 PM  Performed by  CRNA: Tripp Dickey CRNA  Preanesthetic Checklist  Completed: patient identified, site marked, surgical consent, pre-op evaluation, timeout performed, IV checked, risks and benefits discussed and monitors and equipment checked  Prep:  Pt Position: supine  Sterile barriers:gloves, mask, cap and washed/disinfected hands  Prep: ChloraPrep  Patient monitoring: blood pressure monitoring, continuous pulse oximetry and EKG  Procedure  Performed under: generalImages:still images obtained, printed/placed on chart    Laterality:right  Block Type:fascia iliaca compartment  Injection Technique:single-shot  Needle Type:Tuohy  Needle Gauge:20 G  Resistance on Injection: none          Post Assessment  Injection Assessment: negative aspiration for heme, no paresthesia on injection and incremental injection  Patient Tolerance:comfortable throughout block  Complications:no  Additional Notes   Incremental injection with negative aspirate. No pain on injection. Normal injection resistance.  Vascular puncture avoided. Nerves and surrounding tissues identified with local spread around nerves visualized.

## 2021-02-11 NOTE — ANESTHESIA PROCEDURE NOTES
Airway  Urgency: elective    Date/Time: 2/11/2021 1:33 PM  Airway not difficult    General Information and Staff    Patient location during procedure: OR  CRNA: Tripp Dickey CRNA    Indications and Patient Condition  Indications for airway management: airway protection    Preoxygenated: yes  Mask difficulty assessment: 1 - vent by mask    Final Airway Details  Final airway type: endotracheal airway      Successful airway: ETT  Cuffed: yes   Successful intubation technique: direct laryngoscopy  Endotracheal tube insertion site: oral  Blade: Levi  Blade size: 2  ETT size (mm): 7.0  Cormack-Lehane Classification: grade I - full view of glottis  Placement verified by: chest auscultation and capnometry   Measured from: lips  ETT/EBT  to lips (cm): 21  Number of attempts at approach: 1    Additional Comments  Dentition and Lips as preoperative assesment

## 2021-02-11 NOTE — PROGRESS NOTES
Discharge Planning Assessment  [facility]     Patient Name: Bosmsan Luna  MRN: [id number]  Today's Date: 2/11/2021    Admit Date: 2/10/2021    Discharge Needs Assessment     Row Name 02/11/21 1028       Living Environment    Lives With  other (see comments)    Unique Family Situation  [facility]    Current Living Arrangements  group home    Potentially Unsafe Housing Conditions  -- no issues    Primary Care Provided by  other (see comments)    Family Caregiver if Needed  other (see comments) attendents       Resource/Environmental Concerns    Transportation Concerns  car, none       Transition Planning    Patient/Family Anticipates Transition to  other (see comments)    Transportation Anticipated  health plan transportation       Discharge Needs Assessment    Readmission Within the Last 30 Days  no previous admission in last 30 days    Equipment Currently Used at Home  none    Equipment Needed After Discharge  walker, rolling    Outpatient/Agency/Support Group Needs  homecare agency;skilled nursing facility    Provided Post Acute Provider List?  Yes    Post Acute Provider List  Home Health;Nursing Home    Delivered To  Support Person    Support Person  left in room with patient  for [facility] or patient mother    Method of Delivery  In person        Discharge Plan     Row Name 02/11/21 1033       Plan    Plan  return to [facility] with home health  versus  rehab facility    Plan Comments  able to feed self and  helps dress self,  mostly dependent on others for ADL's, mother is guardian,  lives at [facility]  group home  address is [address] [place] [address],  no oxygen, no DME, no home health,  discussed with [facility] attendent   states he would do better to return to  [facility]  than going to rehab,   states they have not been letting any outsiders in due to  Covid,   informed her that he would need rehab after  surgery weither home or in a rehab facility,   states she would have to discuss it with nursing at [facility],  they will transport        Continued Care and Services - Admitted Since 2/10/2021    Coordination has not been started for this encounter.         Demographic Summary     Row Name 02/11/21 1026       General Information    Admission Type  inpatient    Referral Source  admission list    Reason for Consult  discharge planning    Preferred Language  English    General Information Comments  Mother and guardian  Gerladine campbell [phone]        Functional Status     Row Name 02/11/21 1027       Functional Status    Usual Activity Tolerance  moderate    Current Activity Tolerance  fair       Functional Status, IADL    Medications  completely dependent    Meal Preparation  completely dependent    Housekeeping  completely dependent    Laundry  completely dependent    Shopping  completely dependent       Employment/    Employment Status  disabled        Psychosocial    No documentation.       Abuse/Neglect    No documentation.       Legal     Row Name 02/11/21 1027       Financial/Legal    Source of Income  disability    Financial/Environmental Concerns  -- lives at [facility]        Substance Abuse    No documentation.       Patient Forms    No documentation.           Khushbu Brown RN

## 2021-02-11 NOTE — PLAN OF CARE
Goal Outcome Evaluation:        Outcome Summary: Pt resting in bed this shift.  Caregiver at bedside.  VSS.  Pain meds given as needed.  Murphy catheter to BSD.  Anticipate surgery tomorrow to fix right hp fracture.

## 2021-02-11 NOTE — H&P
[facility] [place]   HISTORY AND PHYSICAL      Name:  Bossman Luna   Age:  55 y.o.  Sex:  male  :  1965  MRN:  [id number]   Visit Number:  [id number]  Admission Date:  2/10/2021  Date Of Service:  21  Primary Care Physician:  Hanna Quinones APRN    Chief Complaint: Right hip pain and fall    History Of Presenting Illness:  55 M history of intellectual developmental disability who was brought in from care home after he was found on the floor complaining of pain.  No one witnessed the fall, they found him complaining of pain while laying on the floor and inability to bear weight on his right lower extremity. Pt reports pain in his R. Hip, but denies having any other acute issues.  Caregiver is at bedside and reports that he is a full code.  She also reports that they regularly catheterize him about 5 times a day for history of urinary retention and neurogenic bladder.  Murphy was placed in the ER.    Review Of Systems:Unable to obtain due to patient's clinical condition     Past Medical History:  Past Medical History:   Diagnosis Date   • BPH with urinary obstruction    • Cataract     bilateral   • GERD (gastroesophageal reflux disease)    • Glaucoma    • Neurogenic bladder    • Osteoporosis    • Prostate disorder    • Rash    • Thyroid disease    • Urinary retention      Past Surgical history:  Past Surgical History:   Procedure Laterality Date   • CATARACT EXTRACTION W/ INTRAOCULAR LENS IMPLANT Left 3/9/2020    Procedure: CATARACT PHACO EXTRACTION WITH INTRAOCULAR LENS IMPLANT LEFT;  Surgeon: Winifred Peraza MD;  Location: [facility];  Service: Ophthalmology;  Laterality: Left;   • COLONOSCOPY     • ENDOSCOPY       Social History:  Social History     Socioeconomic History   • Marital status: Single     Spouse name: Not on file   • Number of children: Not on file   • Years of education: Not on file   • Highest education level: Not on file   Occupational History      Employer: UNEMPLOYED   Tobacco Use   • Smoking status: Never Smoker   • Smokeless tobacco: Never Used   Substance and Sexual Activity   • Alcohol use: No   • Drug use: No   • Sexual activity: Never     Family History:  Family History   Family history unknown: Yes     Allergies:  Patient has no known allergies.    Home Medications:  Prior to Admission Medications     Prescriptions Last Dose Informant Patient Reported? Taking?    acetaminophen (TYLENOL) 500 MG tablet   Yes Yes    Take 500 mg by mouth 3 (Three) Times a Day. 0800, 1200, 2000    Budesonide (ENTOCORT EC) 3 MG 24 hr capsule  Self Yes Yes    Take 3 mg by mouth Daily.    calcium citrate-vitamin d (CITRACAL) 200-250 MG-UNIT tablet tablet   Yes Yes    Take 1 tablet by mouth Daily.    clonazePAM (KlonoPIN) 0.5 MG tablet  Self Yes Yes    Take 0.5 mg by mouth 4 (Four) Times a Day. 0800, 1100, 1400, nightly    docusate sodium (COLACE) 250 MG capsule  Self Yes Yes    Take 250 mg by mouth Every Night.    FLUoxetine (PROzac) 20 MG capsule  Self Yes Yes    Take 20 mg by mouth Daily.    fluticasone (FLONASE) 50 MCG/ACT nasal spray  Self Yes Yes    2 sprays into the nostril(s) as directed by provider Daily.    levothyroxine (SYNTHROID, LEVOTHROID) 100 MCG tablet  Self Yes Yes    Take 88 mcg by mouth Daily.    losartan (COZAAR) 25 MG tablet   Yes Yes    Take 25 mg by mouth Every Night.    Melatonin 3 MG capsule  Self Yes No    Take 2 capsules by mouth Every Night. 2 qhs     melatonin 5 MG tablet tablet   Yes Yes    Take 10 mg by mouth Every Night.    metoprolol succinate XL (TOPROL-XL) 25 MG 24 hr tablet  Self Yes No    Take 25 mg by mouth Daily.    metoprolol tartrate (LOPRESSOR) 25 MG tablet  Self Yes Yes    Take 25 mg by mouth Daily.    Multiple Vitamin (MULTIVITAMIN) tablet  Self Yes No    Take 1 tablet by mouth Daily.    multivitamin with minerals (MULTIVITAMIN ADULT PO)   Yes Yes    Take 1 tablet by mouth Daily.    Nutritional Supplements (BOOST SMOOTHIE PO)  Self  Yes Yes    Take 1 can by mouth 2 (Two) Times a Day As Needed.    oyster shell calcium 500 MG tablet tablet  Self Yes No    Take 500 mg by mouth Daily.    pantoprazole (PROTONIX) 40 MG EC tablet  Self Yes Yes    Take 40 mg by mouth Every Night.    potassium chloride (K-DUR,KLOR-CON) 10 MEQ CR tablet  Self Yes Yes    Take 10 mEq by mouth Daily.    risperiDONE (risperDAL) 2 MG tablet  Self Yes Yes    Take 2 mg by mouth Daily.    risperiDONE (RisperDAL) 3 MG tablet  Self Yes Yes    Take 4 mg by mouth Every Night.    tamsulosin (FLOMAX) 0.4 MG capsule 24 hr capsule  Self No Yes    TAKE ONE CAPSULE BY MOUTH NIGHTLY AT BEDTIME    Patient taking differently:  Take 1 capsule by mouth Every Night.    zolpidem (AMBIEN) 5 MG tablet  Self Yes Yes    Take 10 mg by mouth At Night As Needed.         ED Medications:  Medications   sodium chloride 0.9 % flush 10 mL (has no administration in time range)   fentaNYL citrate (PF) (SUBLIMAZE) injection 25 mcg (25 mcg Intravenous Given 2/10/21 2137)   ondansetron (ZOFRAN) injection 4 mg (4 mg Intravenous Given 2/10/21 2135)     Vital Signs:  Temp:  [98.4 °F (36.9 °C)-98.6 °F (37 °C)] 98.4 °F (36.9 °C)  Heart Rate:  [89-93] 93  Resp:  [18] 18  BP: (108-120)/(54-91) 108/54        02/10/21  2056   Weight: 54.4 kg (120 lb)     Physical Exam:  General: NAD, resting in bed  HEENT: EOMI, NC/AT  Heart: regular  Lungs: nonlabored  Abdomen: Soft, nondistended, nontender  Extremities: No edema  MSK: Tenderness to palpation along the right hip  Neurological: A&O to person, not place or time, moves extremities  Psychological: Pleasantly confused  Skin: warm, dry    Laboratory data:I have reviewed the labs done in the emergency room.    Results from last 7 days   Lab Units 02/10/21  2237   SODIUM mmol/L 131*   POTASSIUM mmol/L 3.4*   CHLORIDE mmol/L 94*   CO2 mmol/L 29.7*   BUN mg/dL 15   CREATININE mg/dL 0.78   CALCIUM mg/dL 8.3*   BILIRUBIN mg/dL 0.3   ALK PHOS U/L 59   ALT (SGPT) U/L 16   AST (SGOT)  U/L 20   GLUCOSE mg/dL 105*     Results from last 7 days   Lab Units 02/10/21  2237   WBC 10*3/mm3 7.23   HEMOGLOBIN g/dL 12.6*   HEMATOCRIT % 37.2*   PLATELETS 10*3/mm3 156     Results from last 7 days   Lab Units 02/10/21  2237   INR  1.10     Pain Management Panel     There is no flowsheet data to display.        EKG:  Ordered and pending    Radiology:  Imaging Results (Last 72 Hours)     Procedure Component Value Units Date/Time    XR Hips Bilateral With or Without Pelvis 2 View [[id number]] Resulted: 02/10/21 2155     Updated: 02/10/21 2155      Personally reviewed the x-ray hip which reveals a right-sided intertrochanteric fracture    Assessment:    Closed fracture of right hip (CMS/HCC)    Intellectual disability    Anxiety associated with depression    Neurogenic bladder    Hypothyroidism (acquired)    Fall    Plan:  -NPO, pain control, orthopedics evaluation in AM  -resume home meds occluding antidepressants and antipsychotics  -PT OT and DVT ppx postop  -inpt, full code, high risk 2/2 IV narcotic requirement    Morales Xie DO  02/11/21  02:33 EST    Dictated utilizing Dragon dictation.

## 2021-02-11 NOTE — ANESTHESIA PREPROCEDURE EVALUATION
Anesthesia Evaluation     Patient summary reviewed and Nursing notes reviewed   no history of anesthetic complications:  NPO Solid Status: > 8 hours  NPO Liquid Status: > 8 hours           Airway   Mallampati: I  TM distance: >3 FB  Neck ROM: full  no difficulty expected  Dental - normal exam     Pulmonary - negative pulmonary ROS and normal exam   (-) not a smoker  Cardiovascular - normal exam  Exercise tolerance: good (4-7 METS)    ECG reviewed    (+) hypertension well controlled 2 medications or greater,     ROS comment: 2020 EKG: SR     Neuro/Psych  (+) psychiatric history Anxiety and Depression,     GI/Hepatic/Renal/Endo    (+)  GERD well controlled,  thyroid problem hypothyroidism    Musculoskeletal (-) negative ROS        ROS comment: osteoporosis  Abdominal    Substance History - negative use     OB/GYN negative ob/gyn ROS         Other - negative ROS       ROS/Med Hx Other: Neurogenic bladder  Intellectual Disability     12.6/37.2  K 3.4                    Anesthesia Plan    ASA 2     general with block   (GETA: Risks and benefits discussed including risk of aspiration, recall and dental damage. All patient questions answered. Will continue with POC.    Fascia Iliaca: Discussed risk of infection, bruising, tenderness at injection site, possible nerve damage and risk of failed block. All patient questions answered. Will continue with POC.  Discussed realistic expectation of postoperative pain management.  Informed consent obtained from patient preoperatively.  )  intravenous induction     Anesthetic plan, all risks, benefits, and alternatives have been provided, discussed and informed consent has been obtained with: patient, other and mother.

## 2021-02-11 NOTE — ANESTHESIA POSTPROCEDURE EVALUATION
Patient: Bossman Luna    Procedure Summary     Date: 02/11/21 Room / Location: [facility] OR  /  [facility] OR    Anesthesia Start: 1325 Anesthesia Stop:     Procedure: HIP HEMIARTHROPLASTY (Right Hip) Diagnosis:     Surgeon: Go Sagastume MD Provider: Tripp Dickey CRNA    Anesthesia Type: general with block ASA Status: 2          Anesthesia Type: general with block    Vitals  HR 81  Sat 93  /50  Temp 98.7  Resp 12        Post Anesthesia Care and Evaluation    Patient location during evaluation: PACU  Patient participation: complete - patient participated  Level of consciousness: awake and alert  Pain score: 0  Pain management: satisfactory to patient  Airway patency: patent  Anesthetic complications: No anesthetic complications    Cardiovascular status: acceptable and stable  Respiratory status: acceptable and face mask  Hydration status: acceptable

## 2021-02-11 NOTE — CONSULTS
Patient Care Team:  Hanna Quinones APRN as PCP - General (Family Medicine)    Chief complaint right hip pain    Subjective     Patient is a 55 y.o. male presents with right hip pain.  This is a 55-year-old gentleman that is mentally challenged that had sustained injury to his right hip he had no previous problems with this he is seen at bedside today it was discussed with his mother as well as his caregiver he lives in a group home.  He does ambulate around the house.      Review of Systems   Pertinent items are noted in HPI    History  Past Medical History:   Diagnosis Date   • BPH with urinary obstruction    • Cataract     bilateral   • Development delay    • GERD (gastroesophageal reflux disease)    • Glaucoma    • Hypertension    • Neurogenic bladder    • Osteoporosis    • Prostate disorder    • Rash    • Self-catheterizes urinary bladder    • Thyroid disease    • Urinary retention      Past Surgical History:   Procedure Laterality Date   • CATARACT EXTRACTION W/ INTRAOCULAR LENS IMPLANT Left 3/9/2020    Procedure: CATARACT PHACO EXTRACTION WITH INTRAOCULAR LENS IMPLANT LEFT;  Surgeon: Winifred Peraza MD;  Location: [facility];  Service: Ophthalmology;  Laterality: Left;   • COLONOSCOPY     • ENDOSCOPY       Family History   Family history unknown: Yes     Social History     Tobacco Use   • Smoking status: Never Smoker   • Smokeless tobacco: Never Used   Substance Use Topics   • Alcohol use: No   • Drug use: No     Medications Prior to Admission   Medication Sig Dispense Refill Last Dose   • acetaminophen (TYLENOL) 500 MG tablet Take 500 mg by mouth 3 (Three) Times a Day. 0800, 1200, 2000      • Budesonide (ENTOCORT EC) 3 MG 24 hr capsule Take 3 mg by mouth Daily.      • calcium citrate-vitamin d (CITRACAL) 200-250 MG-UNIT tablet tablet Take 1 tablet by mouth Daily.      • clonazePAM (KlonoPIN) 0.5 MG tablet Take 0.5 mg by mouth 4 (Four) Times a Day. 0800, 1100, 1400, nightly      • docusate sodium (COLACE)  250 MG capsule Take 250 mg by mouth Every Night.      • FLUoxetine (PROzac) 20 MG capsule Take 20 mg by mouth Daily.      • fluticasone (FLONASE) 50 MCG/ACT nasal spray 2 sprays into the nostril(s) as directed by provider Daily.      • levothyroxine (SYNTHROID, LEVOTHROID) 100 MCG tablet Take 88 mcg by mouth Daily.      • losartan (COZAAR) 25 MG tablet Take 25 mg by mouth Every Night.      • melatonin 5 MG tablet tablet Take 10 mg by mouth Every Night.      • metoprolol tartrate (LOPRESSOR) 25 MG tablet Take 25 mg by mouth Daily.      • multivitamin with minerals (MULTIVITAMIN ADULT PO) Take 1 tablet by mouth Daily.      • Nutritional Supplements (BOOST SMOOTHIE PO) Take 1 can by mouth 2 (Two) Times a Day As Needed.      • pantoprazole (PROTONIX) 40 MG EC tablet Take 40 mg by mouth Every Night.      • potassium chloride (K-DUR,KLOR-CON) 10 MEQ CR tablet Take 10 mEq by mouth Daily.      • risperiDONE (risperDAL) 2 MG tablet Take 2 mg by mouth Daily.      • risperiDONE (RisperDAL) 3 MG tablet Take 4 mg by mouth Every Night.      • tamsulosin (FLOMAX) 0.4 MG capsule 24 hr capsule TAKE ONE CAPSULE BY MOUTH NIGHTLY AT BEDTIME (Patient taking differently: Take 1 capsule by mouth Every Night.) 90 capsule 5    • zolpidem (AMBIEN) 5 MG tablet Take 10 mg by mouth At Night As Needed.      • Melatonin 3 MG capsule Take 2 capsules by mouth Every Night. 2 qhs       • metoprolol succinate XL (TOPROL-XL) 25 MG 24 hr tablet Take 25 mg by mouth Daily.      • Multiple Vitamin (MULTIVITAMIN) tablet Take 1 tablet by mouth Daily.  11    • oyster shell calcium 500 MG tablet tablet Take 500 mg by mouth Daily.  11      Allergies:  Patient has no known allergies.    Objective     Vital Signs  Temp:  [98.2 °F (36.8 °C)-98.7 °F (37.1 °C)] 98.4 °F (36.9 °C)  Heart Rate:  [80-93] 80  Resp:  [16-18] 16  BP: ()/(54-91) 90/55    Physical Exam:      General Appearance:    Alert, cooperative, in no acute distress   Head:    Normocephalic,  without obvious abnormality, atraumatic   Eyes:            Lids and lashes normal, conjunctivae and sclerae normal, no   icterus, no pallor, corneas clear, PERRLA   Ears:    Ears appear intact with no abnormalities noted   Throat:   No oral lesions, no thrush, oral mucosa moist   Neck:   No adenopathy, supple, trachea midline, no thyromegaly, no   carotid bruit, no JVD   Back:     No kyphosis present, no scoliosis present, no skin lesions,      erythema or scars, no tenderness to percussion or                   palpation,   range of motion normal   Lungs:     Clear to auscultation,respirations regular, even and                  unlabored    Heart:    Regular rhythm and normal rate, normal S1 and S2, no            murmur, no gallop, no rub, no click   Chest Wall:    No abnormalities observed   Abdomen:     Normal bowel sounds, no masses, no organomegaly, soft        non-tender, non-distended, no guarding, no rebound                tenderness   Rectal:     Deferred   Extremities:   Moves all extremities well, no edema, no cyanosis, no             redness he is lying on his right side in the fetal position   Pulses:   Pulses palpable and equal bilaterally   Skin:   No bleeding, bruising or rash   Lymph nodes:   No palpable adenopathy   Neurologic:   Cranial nerves 2 - 12 grossly intact, sensation intact, DTR       present and equal bilaterally       Results Review:    I reviewed the patient's new clinical results.    Assessment/Plan       Closed fracture of right hip (CMS/HCC)    Intellectual disability    Anxiety associated with depression    Neurogenic bladder    Hypothyroidism (acquired)    Fall    Plan will be for right hip hemiarthroplasty.  As discussed with the caregiver as well as the mother I am concerned given his cognitive impairment risk of dislocation given ability to follow posterior hip precautions.  I think he would benefit most from hemiarthroplasty to allow for ambulation.  As well as protect as much  as possible from dislocation.  It was discussed with the family risk and benefits including infection loosening instability neurovascular injury    I discussed the patients findings and my recommendations with patient, family and Caregiver.     Go Sagastume MD  02/11/21  12:59 EST

## 2021-02-11 NOTE — OP NOTE
[address], [address]  [place]  [address] [phone]      OPERATIVE REPORT         PATIENT NAME:  Bossman Luna                            YOB: 1965        PREOP DIAGNOSIS:   Right hip displaced femoral neck fracture    POSTOP DIAGNOSIS:  Right hip displaced femoral neck fracture    PROCEDURE:   Righthip hemiarthroplasty.    SURGEON:   Dillon Sagastume MD    OPERATIVE TEAM:   Circulator: Nallely Ramos RN  Scrub Person: Hussain Shirley; Maryuri Frazier; Rusty Christie    ANESTHETIST:  LUC: Tripp Dickey CRNA; Gordo Francis CRNA    ANESTHESIA:   Choice      SPECIMENS:   Femoral head sent to pathology      FINDINGS:   Femoral neck fracture    HARDWARE:                        [employer]     COMPLICATIONS:  None    DISPOSITION:  Stable to recovery.    INDICATIONS AND NARRATIVE:  The patient presents for hip hemiarthroplasty for femoral neck fracture it was discussed with the patient and the mother as well as the caregiver to determine need for hemiarthroplasty.  They understood the procedure they understood the risk and benefits.    Risks and benefits of the proposed procedure have been discussed, and informed consent obtained.  Risks were discussed including but not limited to, anesthesia, infection, nerve/vessel/tendon injury, fracture, prosthetic wear, loosening or dislocation, DVT, pulmonary embolus, blood loss and the possible need for transfusion.  Arrangements have been made for tranexemic acid IV protocol.  Goals of the procedure include the potential for pain relief, improved hip motion, limb alignment and improved tolerance with ambulation and activities.      Antibiotic prophylaxis was given.  Surgeon site marking and a time out were performed.  Anesthesia was effective and well tolerated.  The hip and leg were prepped and draped in the usual sterile fashion.  The patient was placed in the lateral decubitus position for the procedure, with  care taken to pad all areas of the body including a bean bag mold, axillary roll, and fibular head and malleolar padding.      After sterile prep and drape, a curved incision was made centered on the greater trochanter of the hip.  Dissection proceeded in line with the skin incision and through the tensor fascia pal.  A Charnley self-retaining retractor was placed.  The hip was gently internally rotated and a blunt Cobra was placed under the gluteus medius.  The piriformis tendon and short external rotators were released from the fossa and tagged for subsequent repair.  A T capsulotomy was performed and the capsule was tagged for repair.  Synovial joint fluid expressed and suctioned.  There were marked degenerative changes, cartilage worn down to bone and a deformed femoral head and acetabulum.  Using the neck-cutting template, with the desired slope and position, a neck cut was made with a saw.  The femoral head was extracted from the hip, and sent to pathology as a specimen.  The trial was performed with a 42 mm monopolar head adequate sizing was noted    Next, steps were taken to prepare the femur.  A box-cutting osteotome was used to lateralize the entry to the canal and along the greater trochanter.  Sequential broaching with the broach was performed, up to the best-fit sizes, which provided an excellent press fit, no toggling.  Care was taken to broach 15-20 degrees of femoral anteversion.  Next, trial heads and necks were placed to find the best range of motion and stability.  There was smooth, free flexion, full extension of the hip and smooth full external and internal rotation with stability.  The trial components were removed.  The wound and bone surfaces were pulse irrigated with copious antibiotic solution, and then suctioned.  Next, the actual stem was placed, which had an excellent press fit that was in good position.  The actual head was then Alvarez tapered onto the femoral stem.  A final reduction  was performed.  Clinically the leg lengths were equal and there was full range of motion and stability of the hip.  The wound was irrigated copiously.      Routine closure was performed and consisted of interrupted #1 Vicryl to repair the capsule, #5 non-absorbable Ethibond to repair the piriformis tendon through greater trochanter bone tunnels, running barbed absorbable suture to repair the tensor fascia pal, 2-0 Vicryl for the deep and superficial subcutaneous layers, and running barbed suture for the skin.  A sterile Dermabond and Covaderm dressing was applied.  An abduction pillow was placed and the patient was moved supine on the hospital bed.  Anesthesia was effective and well tolerated.  There were no complications of surgery.  The patient was transferred in stable condition to the recovery room and x-rays of the pelvis and hip were requested.

## 2021-02-11 NOTE — PLAN OF CARE
Goal Outcome Evaluation:  Plan of Care Reviewed With: caregiver  Progress: no change  Outcome Summary: VSS.  Pain controlled with pain medication.  Patient has been in OR for awhile but anticipate return soon.  Caregiver in room waiting for patient to return.

## 2021-02-11 NOTE — ED NOTES
Called house supervisor for bed assignment, patient will be going to room 426.     Jarret Hernandez  02/10/21 4760

## 2021-02-12 ENCOUNTER — APPOINTMENT (OUTPATIENT)
Dept: GENERAL RADIOLOGY | Facility: HOSPITAL | Age: 56
End: 2021-02-12

## 2021-02-12 LAB
ANION GAP SERPL CALCULATED.3IONS-SCNC: 7.1 MMOL/L (ref 5–15)
BASOPHILS # BLD AUTO: 0.03 10*3/MM3 (ref 0–0.2)
BASOPHILS NFR BLD AUTO: 0.7 % (ref 0–1.5)
BUN SERPL-MCNC: 6 MG/DL (ref 6–20)
BUN/CREAT SERPL: 8.6 (ref 7–25)
CALCIUM SPEC-SCNC: 8.2 MG/DL (ref 8.6–10.5)
CHLORIDE SERPL-SCNC: 98 MMOL/L (ref 98–107)
CO2 SERPL-SCNC: 29.9 MMOL/L (ref 22–29)
CREAT SERPL-MCNC: 0.7 MG/DL (ref 0.76–1.27)
DEPRECATED RDW RBC AUTO: 50 FL (ref 37–54)
EOSINOPHIL # BLD AUTO: 0.14 10*3/MM3 (ref 0–0.4)
EOSINOPHIL NFR BLD AUTO: 3.2 % (ref 0.3–6.2)
ERYTHROCYTE [DISTWIDTH] IN BLOOD BY AUTOMATED COUNT: 13.2 % (ref 12.3–15.4)
GFR SERPL CREATININE-BSD FRML MDRD: 118 ML/MIN/1.73
GLUCOSE SERPL-MCNC: 112 MG/DL (ref 65–99)
HCT VFR BLD AUTO: 32.2 % (ref 37.5–51)
HGB BLD-MCNC: 10.8 G/DL (ref 13–17.7)
IMM GRANULOCYTES # BLD AUTO: 0.03 10*3/MM3 (ref 0–0.05)
IMM GRANULOCYTES NFR BLD AUTO: 0.7 % (ref 0–0.5)
LYMPHOCYTES # BLD AUTO: 0.64 10*3/MM3 (ref 0.7–3.1)
LYMPHOCYTES NFR BLD AUTO: 14.5 % (ref 19.6–45.3)
MCH RBC QN AUTO: 34 PG (ref 26.6–33)
MCHC RBC AUTO-ENTMCNC: 33.5 G/DL (ref 31.5–35.7)
MCV RBC AUTO: 101.3 FL (ref 79–97)
MONOCYTES # BLD AUTO: 0.39 10*3/MM3 (ref 0.1–0.9)
MONOCYTES NFR BLD AUTO: 8.8 % (ref 5–12)
NEUTROPHILS NFR BLD AUTO: 3.18 10*3/MM3 (ref 1.7–7)
NEUTROPHILS NFR BLD AUTO: 72.1 % (ref 42.7–76)
NRBC BLD AUTO-RTO: 0 /100 WBC (ref 0–0.2)
PLATELET # BLD AUTO: 133 10*3/MM3 (ref 140–450)
PMV BLD AUTO: 9.9 FL (ref 6–12)
POTASSIUM SERPL-SCNC: 3.7 MMOL/L (ref 3.5–5.2)
RBC # BLD AUTO: 3.18 10*6/MM3 (ref 4.14–5.8)
SODIUM SERPL-SCNC: 135 MMOL/L (ref 136–145)
WBC # BLD AUTO: 4.41 10*3/MM3 (ref 3.4–10.8)

## 2021-02-12 PROCEDURE — 85025 COMPLETE CBC W/AUTO DIFF WBC: CPT | Performed by: INTERNAL MEDICINE

## 2021-02-12 PROCEDURE — 25010000003 CEFAZOLIN SODIUM-DEXTROSE 2-3 GM-%(50ML) RECONSTITUTED SOLUTION: Performed by: ORTHOPAEDIC SURGERY

## 2021-02-12 PROCEDURE — 73502 X-RAY EXAM HIP UNI 2-3 VIEWS: CPT

## 2021-02-12 PROCEDURE — 97166 OT EVAL MOD COMPLEX 45 MIN: CPT

## 2021-02-12 PROCEDURE — 97530 THERAPEUTIC ACTIVITIES: CPT

## 2021-02-12 PROCEDURE — 25010000002 ENOXAPARIN PER 10 MG: Performed by: ORTHOPAEDIC SURGERY

## 2021-02-12 PROCEDURE — 80048 BASIC METABOLIC PNL TOTAL CA: CPT | Performed by: INTERNAL MEDICINE

## 2021-02-12 PROCEDURE — 99232 SBSQ HOSP IP/OBS MODERATE 35: CPT | Performed by: INTERNAL MEDICINE

## 2021-02-12 PROCEDURE — 97162 PT EVAL MOD COMPLEX 30 MIN: CPT

## 2021-02-12 RX ADMIN — HYDROCODONE BITARTRATE AND ACETAMINOPHEN 1 TABLET: 7.5; 325 TABLET ORAL at 12:16

## 2021-02-12 RX ADMIN — PANTOPRAZOLE SODIUM 40 MG: 40 TABLET, DELAYED RELEASE ORAL at 21:27

## 2021-02-12 RX ADMIN — SODIUM CHLORIDE, PRESERVATIVE FREE 10 ML: 5 INJECTION INTRAVENOUS at 21:15

## 2021-02-12 RX ADMIN — LEVOTHYROXINE SODIUM 88 MCG: 88 TABLET ORAL at 08:08

## 2021-02-12 RX ADMIN — CEFAZOLIN SODIUM 2 G: 2 SOLUTION INTRAVENOUS at 05:42

## 2021-02-12 RX ADMIN — BUDESONIDE 3 MG: 3 CAPSULE, GELATIN COATED ORAL at 08:08

## 2021-02-12 RX ADMIN — CLONAZEPAM 0.5 MG: 0.5 TABLET ORAL at 14:15

## 2021-02-12 RX ADMIN — TAMSULOSIN HYDROCHLORIDE 0.4 MG: 0.4 CAPSULE ORAL at 21:15

## 2021-02-12 RX ADMIN — HYDROCODONE BITARTRATE AND ACETAMINOPHEN 1 TABLET: 7.5; 325 TABLET ORAL at 08:08

## 2021-02-12 RX ADMIN — RISPERIDONE 2 MG: 1 TABLET ORAL at 08:08

## 2021-02-12 RX ADMIN — HYDROCODONE BITARTRATE AND ACETAMINOPHEN 1 TABLET: 7.5; 325 TABLET ORAL at 16:52

## 2021-02-12 RX ADMIN — FLUOXETINE 20 MG: 20 CAPSULE ORAL at 08:08

## 2021-02-12 RX ADMIN — Medication 10 MG: at 21:15

## 2021-02-12 RX ADMIN — FLUTICASONE PROPIONATE 2 SPRAY: 50 SPRAY, METERED NASAL at 08:08

## 2021-02-12 RX ADMIN — CLONAZEPAM 0.5 MG: 0.5 TABLET ORAL at 05:42

## 2021-02-12 RX ADMIN — RISPERIDONE 4 MG: 1 TABLET ORAL at 21:14

## 2021-02-12 RX ADMIN — METOPROLOL SUCCINATE 25 MG: 25 TABLET, EXTENDED RELEASE ORAL at 08:07

## 2021-02-12 RX ADMIN — DOCUSATE SODIUM 100 MG: 100 CAPSULE ORAL at 08:08

## 2021-02-12 RX ADMIN — ENOXAPARIN SODIUM 40 MG: 40 INJECTION SUBCUTANEOUS at 08:34

## 2021-02-12 RX ADMIN — HYDROCODONE BITARTRATE AND ACETAMINOPHEN 1 TABLET: 7.5; 325 TABLET ORAL at 03:41

## 2021-02-12 RX ADMIN — CLONAZEPAM 0.5 MG: 0.5 TABLET ORAL at 21:15

## 2021-02-12 NOTE — PLAN OF CARE
Goal Outcome Evaluation:  Plan of Care Reviewed With: patient, caregiver  Progress: improving  Outcome Summary: VSS. Pain controlled with pain medication.  Patient has gotten up to chair.  Caregiver @ BS.

## 2021-02-12 NOTE — THERAPY EVALUATION
Patient Name: Bossman Luan  : 1965    MRN: [id number]                              Today's Date: 2021       Admit Date: 2/10/2021    Visit Dx:     ICD-10-CM ICD-9-CM   1. Closed fracture of right hip, initial encounter (CMS/Formerly [facility])  S72.001A 820.8   2. Closed fracture of hip (CMS/HCC)  S72.009A 820.8     Patient Active Problem List   Diagnosis   • Closed fracture of right hip (CMS/Formerly [facility])   • Intellectual disability   • Anxiety associated with depression   • Neurogenic bladder   • Hypothyroidism (acquired)   • Fall     Past Medical History:   Diagnosis Date   • BPH with urinary obstruction    • Cataract     bilateral   • Development delay    • GERD (gastroesophageal reflux disease)    • Glaucoma    • Hypertension    • Impaired functional mobility, balance, gait, and endurance    • Neurogenic bladder    • Osteoporosis    • Prostate disorder    • Rash    • Self-catheterizes urinary bladder    • Thyroid disease    • Urinary retention      Past Surgical History:   Procedure Laterality Date   • CATARACT EXTRACTION W/ INTRAOCULAR LENS IMPLANT Left 3/9/2020    Procedure: CATARACT PHACO EXTRACTION WITH INTRAOCULAR LENS IMPLANT LEFT;  Surgeon: Winifred Peraza MD;  Location: [facility];  Service: Ophthalmology;  Laterality: Left;   • COLONOSCOPY     • ENDOSCOPY     • HIP HEMIARTHROPLASTY Right 2021    Procedure: HIP HEMIARTHROPLASTY;  Surgeon: Go Sagastume MD;  Location: [facility];  Service: Orthopedics;  Laterality: Right;     General Information     Row Name 21 100          Physical Therapy Time and Intention    Document Type  evaluation  -LM     Mode of Treatment  physical therapy  -LM     Row Name 21 1001          General Information    Patient Profile Reviewed  yes  -LM     Prior Level of Function  min assist:;all household mobility;transfer  -LM     Existing Precautions/Restrictions  fall  -LM     Barriers to Rehab  medically complex;previous functional deficit;cognitive status Pt has  developmental delays.  -LM     Row Name 02/12/21 1001          Living Environment    Lives With  facility resident Vasyl Reeves  -LM     Row Name 02/12/21 1001          Home Main Entrance    Number of Stairs, Main Entrance  none  -LM     Row Name 02/12/21 1001          Stairs Within Home, Primary    Number of Stairs, Within Home, Primary  none  -LM     Row Name 02/12/21 1001          Cognition    Orientation Status (Cognition)  oriented to;person;place;verbal cues/prompts needed for orientation  -LM     Row Name 02/12/21 1001          Safety Issues, Functional Mobility    Safety Issues Affecting Function (Mobility)  ability to follow commands;at risk behavior observed;awareness of need for assistance;impulsivity;insight into deficits/self-awareness;judgment;problem-solving;safety precaution awareness;safety precautions follow-through/compliance  -LM     Impairments Affecting Function (Mobility)  balance;cognition;coordination;endurance/activity tolerance;strength;grasp;motor control;motor planning;pain;range of motion (ROM)  -LM     Cognitive Impairments, Mobility Safety/Performance  attention;awareness, need for assistance;impulsivity;insight into deficits/self-awareness;judgment;problem-solving/reasoning;safety precaution awareness;safety precaution follow-through  -LM       User Key  (r) = Recorded By, (t) = Taken By, (c) = Cosigned By    Initials Name Provider Type    LM Albina Issa PT Physical Therapist        Mobility     Row Name 02/12/21 1001          Bed Mobility    Bed Mobility  supine-sit;sit-supine  -LM     Supine-Sit House (Bed Mobility)  verbal cues;dependent (less than 25% patient effort);2 person assist  -LM     Sit-Supine House (Bed Mobility)  verbal cues;dependent (less than 25% patient effort);2 person assist  -LM     Assistive Device (Bed Mobility)  bed rails;draw sheet;head of bed elevated  -LM     Comment (Bed Mobility)  Pt found in supine with ABD wedge in place. Despite  wedge, B LE's held in extreme knee/hip flexion with R LE also held in internal rotation. No evidence of dislocation or increased pain. PT assisted patient to move R LE into safer position but still observed to be held in flexion and internal rotation.  -     Row Name 02/12/21 1001          Transfers    Comment (Transfers)  Unable to perform. Patient set EOB x 10 minutes with CGA. Requests assistance back to bed.  -     Row Name 02/12/21 1001          Mobility    Extremity Weight-bearing Status  right lower extremity  -LM     Right Lower Extremity (Weight-bearing Status)  weight-bearing as tolerated (WBAT)  -       User Key  (r) = Recorded By, (t) = Taken By, (c) = Cosigned By    Initials Name Provider Type    Albina Jack, COLBY Physical Therapist        Obj/Interventions     Row Name 02/12/21 1001          Range of Motion Comprehensive    General Range of Motion  bilateral upper extremity ROM WFL;lower extremity range of motion deficits identified  -LM     Comment, General Range of Motion  R knee valgus; R hip held in flexion and internal rotation despite ABD pillow in place.L LE WFL.  -[facility] Name 02/12/21 1001          Strength Comprehensive (MMT)    Comment, General Manual Muscle Testing (MMT) Assessment  Unable to MMT.  -[facility] Name 02/12/21 1001          Balance    Balance Assessment  sitting static balance;sitting dynamic balance  -     Static Sitting Balance  mild impairment;supported;sitting, edge of bed  -LM     Dynamic Sitting Balance  mild impairment;supported;sitting, edge of bed  -LM       User Key  (r) = Recorded By, (t) = Taken By, (c) = Cosigned By    Initials Name Provider Type    Albina Jack, PT Physical Therapist        Goals/Plan     Row Name 02/12/21 1001          Bed Mobility Goal 1 (PT)    Activity/Assistive Device (Bed Mobility Goal 1, PT)  bed mobility activities, all;bed rails  -     [place] Level/Cues Needed (Bed Mobility Goal 1, PT)  verbal cues  required;minimum assist (75% or more patient effort)  -LM     Time Frame (Bed Mobility Goal 1, PT)  long term goal (LTG);10 days  -LM     Progress/Outcomes (Bed Mobility Goal 1, PT)  goal ongoing  -LM     Row Name 02/12/21 1001          Transfer Goal 1 (PT)    Activity/Assistive Device (Transfer Goal 1, PT)  sit-to-stand/stand-to-sit;bed-to-chair/chair-to-bed;walker, rolling  -LM     [place] Level/Cues Needed (Transfer Goal 1, PT)  verbal cues required;moderate assist (50-74% patient effort)  -LM     Time Frame (Transfer Goal 1, PT)  long term goal (LTG);10 days  -LM     Progress/Outcome (Transfer Goal 1, PT)  goal ongoing  -LM     Row Name 02/12/21 1001          Gait Training Goal 1 (PT)    Activity/Assistive Device (Gait Training Goal 1, PT)  gait (walking locomotion);assistive device use;walker, rolling  -LM     [place] Level (Gait Training Goal 1, PT)  verbal cues required;moderate assist (50-74% patient effort)  -LM     Distance (Gait Training Goal 1, PT)  20'  -LM     Time Frame (Gait Training Goal 1, PT)  long term goal (LTG);10 days  -LM     Progress/Outcome (Gait Training Goal 1, PT)  goal ongoing  -LM     Row Name 02/12/21 1001          Patient Education Goal (PT)    Activity (Patient Education Goal, PT)  Pt will perform B LE ther ex x 15 reps.  -LM     [place]/Cues/Accuracy (Memory Goal 2, PT)  demonstrates adequately  -LM     Time Frame (Patient Education Goal, PT)  long term goal (LTG);10 days  -LM     Progress/Outcome (Patient Education Goal, PT)  goal ongoing  -LM       User Key  (r) = Recorded By, (t) = Taken By, (c) = Cosigned By    Initials Name Provider Type    LM Albina Issa, PT Physical Therapist        Clinical Impression     Row Name 02/12/21 1001          Pain    Additional Documentation  Pain Scale: FACES Pre/Post-Treatment (Group)  -LM     Row Name 02/12/21 1001          Pain Scale: Numbers Pre/Post-Treatment    Pain Intervention(s)  Repositioned  -LM     Row Name 02/12/21  1001          Pain Scale: FACES Pre/Post-Treatment    Pain: FACES Scale, Pretreatment  2-->hurts little bit  -LM     Posttreatment Pain Rating  4-->hurts little more  -LM     Pain Location - Side  Right  -LM     Pain Location  hip  -LM     Row Name 02/12/21 1001          Plan of Care Review    Plan of Care Reviewed With  patient  -LM     Progress  no change  -LM     Outcome Summary  PT eval completed. Patient is s/p R hip hemiarthroplasty after sustaining a hip fx from a fall. He presents with deficits in ROM, strength, balance, endurance and independence. He is expected to benefit from continued skilled PT intervention to improve his mobility status prior to D/C.  -LM     Row Name 02/12/21 1001          Therapy Assessment/Plan (PT)    Patient/Family Therapy Goals Statement (PT)  None stated.  -LM     Rehab Potential (PT)  good, to achieve stated therapy goals  -LM     Criteria for Skilled Interventions Met (PT)  yes;skilled treatment is necessary  -LM     Row Name 02/12/21 1001          Vital Signs    O2 Delivery Pre Treatment  room air  -LM     O2 Delivery Intra Treatment  room air  -LM     O2 Delivery Post Treatment  room air  -LM     Row Name 02/12/21 1001          Positioning and Restraints    Pre-Treatment Position  in bed  -LM     Post Treatment Position  bed  -LM     In Bed  notified nsg;supine;call light within reach;encouraged to call for assist;exit alarm on;ABD pillow  -LM       User Key  (r) = Recorded By, (t) = Taken By, (c) = Cosigned By    Initials Name Provider Type    LM Albina Issa, PT Physical Therapist        Outcome Measures     Row Name 02/12/21 1001          How much help from another person do you currently need...    Turning from your back to your side while in flat bed without using bedrails?  2  -LM     Moving from lying on back to sitting on the side of a flat bed without bedrails?  1  -LM     Moving to and from a bed to a chair (including a wheelchair)?  1  -LM     Standing up from a  chair using your arms (e.g., wheelchair, bedside chair)?  1  -LM     Climbing 3-5 steps with a railing?  1  -LM     To walk in hospital room?  1  -LM     AM-PAC 6 Clicks Score (PT)  7  -LM     Row Name 02/12/21 1001          Functional Assessment    Outcome Measure Options  AM-PAC 6 Clicks Basic Mobility (PT)  -       User Key  (r) = Recorded By, (t) = Taken By, (c) = Cosigned By    Initials Name Provider Type    LM Albina Issa, PT Physical Therapist        Physical Therapy Education                 Title: PT OT SLP Therapies (In Progress)     Topic: Physical Therapy (In Progress)     Point: Mobility training (In Progress)     Learning Progress Summary           Patient Acceptance, E,TB, NR,NL by  at 2/12/2021 1225    Comment: Purpose of PT/POC.                   Point: Home exercise program (In Progress)     Learning Progress Summary           Patient Acceptance, E,TB, NR,NL by  at 2/12/2021 1225    Comment: Purpose of PT/POC.                   Point: Precautions (In Progress)     Learning Progress Summary           Patient Acceptance, E,TB, NR,NL by  at 2/12/2021 1225    Comment: Purpose of PT/POC.                               User Key     Initials Effective Dates Name Provider Type Discipline     04/03/18 -  Albina Issa, COLBY Physical Therapist PT              PT Recommendation and Plan  Planned Therapy Interventions (PT): balance training, bed mobility training, transfer training, gait training, home exercise program, patient/family education, strengthening  Plan of Care Reviewed With: patient  Progress: no change  Outcome Summary: PT eval completed. Patient is s/p R hip hemiarthroplasty after sustaining a hip fx from a fall. He presents with deficits in ROM, strength, balance, endurance and independence. He is expected to benefit from continued skilled PT intervention to improve his mobility status prior to D/C.     Time Calculation:   PT Charges     Row Name 02/12/21 1225             Time  Calculation    Start Time  1001  -LM      PT Received On  02/12/21  -SANDRA      PT Goal Re-Cert Due Date  02/22/21  -LM        User Key  (r) = Recorded By, (t) = Taken By, (c) = Cosigned By    Initials Name Provider Type    Albina Jack, PT Physical Therapist        Therapy Charges for Today     Code Description Service Date Service Provider Modifiers Qty    [id number] HC PT EVAL MOD COMPLEXITY 3 2/12/2021 Albina Issa, PT GP 1          PT G-Codes  Outcome Measure Options: AM-PAC 6 Clicks Daily Activity (OT)  AM-PAC 6 Clicks Score (PT): 7  AM-PAC 6 Clicks Score (OT): 8    Albina Issa PT  2/12/2021
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
## 2021-02-12 NOTE — PLAN OF CARE
Goal Outcome Evaluation:  Plan of Care Reviewed With: patient  Progress: improving  Outcome Summary: VSS.  Pt c/o pain that was controlled with PRN meds.  No acute changes in pt condition to report.  Will continue to monitor.

## 2021-02-12 NOTE — THERAPY TREATMENT NOTE
Patient Name: Bossman Luna  : 1965    MRN: [id number]                              Today's Date: 2021       Admit Date: 2/10/2021    Visit Dx:     ICD-10-CM ICD-9-CM   1. Closed fracture of right hip, initial encounter (CMS/formerly [facility])  S72.001A 820.8   2. Closed fracture of hip (CMS/formerly [facility])  S72.009A 820.8     Patient Active Problem List   Diagnosis   • Closed fracture of right hip (CMS/formerly [facility])   • Intellectual disability   • Anxiety associated with depression   • Neurogenic bladder   • Hypothyroidism (acquired)   • Fall     Past Medical History:   Diagnosis Date   • BPH with urinary obstruction    • Cataract     bilateral   • Development delay    • GERD (gastroesophageal reflux disease)    • Glaucoma    • Hypertension    • Impaired functional mobility, balance, gait, and endurance    • Neurogenic bladder    • Osteoporosis    • Prostate disorder    • Rash    • Self-catheterizes urinary bladder    • Thyroid disease    • Urinary retention      Past Surgical History:   Procedure Laterality Date   • CATARACT EXTRACTION W/ INTRAOCULAR LENS IMPLANT Left 3/9/2020    Procedure: CATARACT PHACO EXTRACTION WITH INTRAOCULAR LENS IMPLANT LEFT;  Surgeon: Winifred Peraza MD;  Location: [facility];  Service: Ophthalmology;  Laterality: Left;   • COLONOSCOPY     • ENDOSCOPY     • HIP HEMIARTHROPLASTY Right 2021    Procedure: HIP HEMIARTHROPLASTY;  Surgeon: Go Sagastume MD;  Location: [facility];  Service: Orthopedics;  Laterality: Right;     General Information     Row Name 21 1605 21 1001       Physical Therapy Time and Intention    Document Type  therapy note (daily note)  -RM  evaluation  -LM    Mode of Treatment  physical therapy  -RM  physical therapy  -LM    Row Name 21 1605 21 1001       General Information    Patient Profile Reviewed  yes  -RM  yes  -LM    Prior Level of Function  --  min assist:;all household mobility;transfer  -LM    Existing Precautions/Restrictions  hip, posterior   -RM  fall  -LM    Barriers to Rehab  --  medically complex;previous functional deficit;cognitive status Pt has developmental delays.  -LM    Row Name 02/12/21 1001          Living Environment    Lives With  facility resident Vasyl Reeves  -LM     Row Name 02/12/21 1001          Home Main Entrance    Number of Stairs, Main Entrance  none  -LM     Row Name 02/12/21 1001          Stairs Within Home, Primary    Number of Stairs, Within Home, Primary  none  -LM     Row Name 02/12/21 1605 02/12/21 1001       Cognition    Orientation Status (Cognition)  oriented to;person  -RM  oriented to;person;place;verbal cues/prompts needed for orientation  -LM    Row Name 02/12/21 1605 02/12/21 1001       Safety Issues, Functional Mobility    Safety Issues Affecting Function (Mobility)  safety precautions follow-through/compliance;sequencing abilities;safety precaution awareness;problem-solving;insight into deficits/self-awareness;ability to follow commands  -RM  ability to follow commands;at risk behavior observed;awareness of need for assistance;impulsivity;insight into deficits/self-awareness;judgment;problem-solving;safety precaution awareness;safety precautions follow-through/compliance  -LM    Impairments Affecting Function (Mobility)  balance;cognition;coordination;endurance/activity tolerance;grasp;motor control;motor planning;pain;strength  -RM  balance;cognition;coordination;endurance/activity tolerance;strength;grasp;motor control;motor planning;pain;range of motion (ROM)  -LM    Cognitive Impairments, Mobility Safety/Performance  --  attention;awareness, need for assistance;impulsivity;insight into deficits/self-awareness;judgment;problem-solving/reasoning;safety precaution awareness;safety precaution follow-through  -LM      User Key  (r) = Recorded By, (t) = Taken By, (c) = Cosigned By    Initials Name Provider Type    Albina Jack, PT Physical Therapist    RM Luca Covington, PTA Physical Therapy Assistant         Mobility     Row Name 02/12/21 1606 02/12/21 1001       Bed Mobility    Bed Mobility  --  supine-sit;sit-supine  -LM    Supine-Sit [employer] (Bed Mobility)  maximum assist (25% patient effort);2 person assist  -RM  verbal cues;dependent (less than 25% patient effort);2 person assist  -LM    Sit-Supine [employer] (Bed Mobility)  --  verbal cues;dependent (less than 25% patient effort);2 person assist  -LM    Assistive Device (Bed Mobility)  draw sheet;head of bed elevated  -RM  bed rails;draw sheet;head of bed elevated  -LM    Comment (Bed Mobility)  --  Pt found in supine with ABD wedge in place. Despite wedge, B LE's held in extreme knee/hip flexion with R LE also held in internal rotation. No evidence of dislocation or increased pain. PT assisted patient to move R LE into safer position but still observed to be held in flexion and internal rotation.  -    Row Name 02/12/21 1001          Transfers    Comment (Transfers)  Unable to perform. Patient set EOB x 10 minutes with CGA. Requests assistance back to bed.  -LM     Row Name 02/12/21 1606          Bed-Chair Transfer    Bed-Chair [employer] [place] (Transfers)  maximum assist (25% patient effort);2 person assist  -RM     Assistive Device (Bed-Chair Transfers)  -- gt belt  -RM     Row Name 02/12/21 1606          Sit-Stand Transfer    Sit-Stand [employer] (Transfers)  maximum assist (25% patient effort);2 person assist  -RM     Assistive Device (Sit-Stand Transfers)  -- gt belt  -RM     Row Name 02/12/21 1606          Gait/Stairs (Locomotion)    [employer] [place] Level (Gait)  unable to assess  -     Row Name 02/12/21 1606 02/12/21 1001       Mobility    Extremity Weight-bearing Status  --  right lower extremity  -LM    Right Lower Extremity (Weight-bearing Status)  weight-bearing as tolerated (WBAT)  -RM  weight-bearing as tolerated (WBAT)  -LM      User Key  (r) = Recorded By, (t) = Taken By, (c) = Cosigned By    Initials Name Provider Type    SANDRA Issa  Albina, PT Physical Therapist    Luca Mcfarlane, PTA Physical Therapy Assistant        Obj/Interventions     Row Name 02/12/21 1001          Range of Motion Comprehensive    General Range of Motion  bilateral upper extremity ROM WFL;lower extremity range of motion deficits identified  -LM     Comment, General Range of Motion  R knee valgus; R hip held in flexion and internal rotation despite ABD pillow in place.L LE WFL.  -LM     [facility] Name 02/12/21 1001          Strength Comprehensive (MMT)    Comment, General Manual Muscle Testing (MMT) Assessment  Unable to MMT.  -LM     [facility] Name 02/12/21 1001          Balance    Balance Assessment  sitting static balance;sitting dynamic balance  -LM     Static Sitting Balance  mild impairment;supported;sitting, edge of bed  -LM     Dynamic Sitting Balance  mild impairment;supported;sitting, edge of bed  -LM       User Key  (r) = Recorded By, (t) = Taken By, (c) = Cosigned By    Initials Name Provider Type    LM Albina Issa, PT Physical Therapist        Goals/Plan     [facility] Name 02/12/21 1001          Bed Mobility Goal 1 (PT)    Activity/Assistive Device (Bed Mobility Goal 1, PT)  bed mobility activities, all;bed rails  -LM     [place] Level/Cues Needed (Bed Mobility Goal 1, PT)  verbal cues required;minimum assist (75% or more patient effort)  -LM     Time Frame (Bed Mobility Goal 1, PT)  long term goal (LTG);10 days  -LM     Progress/Outcomes (Bed Mobility Goal 1, PT)  goal ongoing  -[facility] Name 02/12/21 1001          Transfer Goal 1 (PT)    Activity/Assistive Device (Transfer Goal 1, PT)  sit-to-stand/stand-to-sit;bed-to-chair/chair-to-bed;walker, rolling  -LM     [place] Level/Cues Needed (Transfer Goal 1, PT)  verbal cues required;moderate assist (50-74% patient effort)  -LM     Time Frame (Transfer Goal 1, PT)  long term goal (LTG);10 days  -LM     Progress/Outcome (Transfer Goal 1, PT)  goal ongoing  -[facility] Name 02/12/21 1001          Gait Training  Goal 1 (PT)    Activity/Assistive Device (Gait Training Goal 1, PT)  gait (walking locomotion);assistive device use;walker, rolling  -LM     [place] Level (Gait Training Goal 1, PT)  verbal cues required;moderate assist (50-74% patient effort)  -LM     Distance (Gait Training Goal 1, PT)  20'  -LM     Time Frame (Gait Training Goal 1, PT)  long term goal (LTG);10 days  -LM     Progress/Outcome (Gait Training Goal 1, PT)  goal ongoing  -LM     Row Name 02/12/21 1001          Patient Education Goal (PT)    Activity (Patient Education Goal, PT)  Pt will perform B LE ther ex x 15 reps.  -LM     [place]/Cues/Accuracy (Memory Goal 2, PT)  demonstrates adequately  -LM     Time Frame (Patient Education Goal, PT)  long term goal (LTG);10 days  -LM     Progress/Outcome (Patient Education Goal, PT)  goal ongoing  -LM       User Key  (r) = Recorded By, (t) = Taken By, (c) = Cosigned By    Initials Name Provider Type    LM Albina Issa, PT Physical Therapist        Clinical Impression     Row Name 02/12/21 1607 02/12/21 1001       Pain    Additional Documentation  Pain Scale: FACES Pre/Post-Treatment (Group)  -RM  Pain Scale: FACES Pre/Post-Treatment (Group)  -LM    Row Name 02/12/21 1001          Pain Scale: Numbers Pre/Post-Treatment    Pain Intervention(s)  Repositioned  -LM     Row Name 02/12/21 1607 02/12/21 1001       Pain Scale: FACES Pre/Post-Treatment    Pain: FACES Scale, Pretreatment  4-->hurts little more  -RM  2-->hurts little bit  -LM    Posttreatment Pain Rating  4-->hurts little more  -RM  4-->hurts little more  -LM    Pain Location - Side  Right  -RM  Right  -LM    Pain Location - Orientation  lower  -RM  --    Pain Location  extremity  -RM  hip  -LM    Row Name 02/12/21 1607 02/12/21 1001       Plan of Care Review    Plan of Care Reviewed With  patient;caregiver  -RM  patient  -LM    Progress  improving  -RM  no change  -LM    Outcome Summary  Pt tolerated treatment well. Pt was positioned in bed  straight with adequate symmetry noted in B LE.  Pt was max a x 2 for supine to sit as well as for transfer from bed to chair.  During bed mobility abd pillow was kept in place to decrease possibility of breaking THP. Due to patients cognition pt was unable to comprehend THP and reasons for  maintaining. See flowsheet for details.  -RM  PT eval completed. Patient is s/p R hip hemiarthroplasty after sustaining a hip fx from a fall. He presents with deficits in ROM, strength, balance, endurance and independence. He is expected to benefit from continued skilled PT intervention to improve his mobility status prior to D/C.  -LM    Row Name 02/12/21 1001          Therapy Assessment/Plan (PT)    Patient/Family Therapy Goals Statement (PT)  None stated.  -LM     Rehab Potential (PT)  good, to achieve stated therapy goals  -     Criteria for Skilled Interventions Met (PT)  yes;skilled treatment is necessary  -     Row Name 02/12/21 1001          Vital Signs    O2 Delivery Pre Treatment  room air  -LM     O2 Delivery Intra Treatment  room air  -LM     O2 Delivery Post Treatment  room air  -LM     Row Name 02/12/21 1607 02/12/21 1001       Positioning and Restraints    Pre-Treatment Position  in bed  -RM  in bed  -LM    Post Treatment Position  chair  -RM  bed  -LM    In Bed  --  notified nsg;supine;call light within reach;encouraged to call for assist;exit alarm on;ABD pillow  -LM    In Chair  reclined;call light within reach;encouraged to call for assist;exit alarm on;with family/caregiver;ABD pillow;notified nsg  -RM  --      User Key  (r) = Recorded By, (t) = Taken By, (c) = Cosigned By    Initials Name Provider Type    Albina Jack, PT Physical Therapist    RM Luca Covington, PTA Physical Therapy Assistant        Outcome Measures     Row Name 02/12/21 1617 02/12/21 1001       How much help from another person do you currently need...    Turning from your back to your side while in flat bed without using  bedrails?  2  -RM  2  -LM    Moving from lying on back to sitting on the side of a flat bed without bedrails?  2  -RM  1  -LM    Moving to and from a bed to a chair (including a wheelchair)?  1  -RM  1  -LM    Standing up from a chair using your arms (e.g., wheelchair, bedside chair)?  1  -RM  1  -LM    Climbing 3-5 steps with a railing?  1  -RM  1  -LM    To walk in hospital room?  1  -RM  1  -LM    AM-PAC 6 Clicks Score (PT)  8  -RM  7  -LM    Row Name 02/12/21 1617 02/12/21 1001       Functional Assessment    Outcome Measure Options  AM-PAC 6 Clicks Basic Mobility (PT)  -RM  AM-PAC 6 Clicks Basic Mobility (PT)  -LM      User Key  (r) = Recorded By, (t) = Taken By, (c) = Cosigned By    Initials Name Provider Type     Albina Issa, PT Physical Therapist     Luca Covington, PTA Physical Therapy Assistant        Physical Therapy Education                 Title: PT OT SLP Therapies (In Progress)     Topic: Physical Therapy (In Progress)     Point: Mobility training (Done)     Learning Progress Summary           Patient Acceptance, E,TB,D, VU,NR,NL by  at 2/12/2021 1617    Comment: THP and sequencing for mobility    Acceptance, E,TB, NR,NL by  at 2/12/2021 1225    Comment: Purpose of PT/POC.                   Point: Home exercise program (In Progress)     Learning Progress Summary           Patient Acceptance, E,TB, NR,NL by  at 2/12/2021 1225    Comment: Purpose of PT/POC.                   Point: Precautions (In Progress)     Learning Progress Summary           Patient Acceptance, E,TB, NR,NL by  at 2/12/2021 1225    Comment: Purpose of PT/POC.                               User Key     Initials Effective Dates Name Provider Type Discipline     04/03/18 -  Albina Issa, PT Physical Therapist PT     03/07/18 -  Luca Covington, ROSE Physical Therapy Assistant PT              PT Recommendation and Plan     Plan of Care Reviewed With: patient, caregiver  Progress: improving  Outcome Summary: Pt  tolerated treatment well. Pt was positioned in bed straight with adequate symmetry noted in B LE.  Pt was max a x 2 for supine to sit as well as for transfer from bed to chair.  During bed mobility abd pillow was kept in place to decrease possibility of breaking THP. Due to patients cognition pt was unable to comprehend THP and reasons for  maintaining. See flowsheet for details.     Time Calculation:   PT Charges     Row Name 02/12/21 1618 02/12/21 1225          Time Calculation    Start Time  1519  -RM  1001  -LM     Stop Time  1546  -RM  --     Time Calculation (min)  27 min  -RM  --     PT Received On  02/12/21  -RM  02/12/21  -LM     PT Goal Re-Cert Due Date  02/22/21  -  02/22/21  -LM        Time Calculation- PT    Total Timed Code Minutes- PT  27 minute(s)  -RM  --        Timed Charges    46743 - PT Therapeutic Activity Minutes  27  -RM  --       User Key  (r) = Recorded By, (t) = Taken By, (c) = Cosigned By    Initials Name Provider Type    Albina Jack, PT Physical Therapist    Luca Mcfarlane, PTA Physical Therapy Assistant        Therapy Charges for Today     Code Description Service Date Service Provider Modifiers Qty    [id number] HC PT THERAPEUTIC ACT EA 15 MIN 2/12/2021 Luca Covington, ROSE GP 2          PT G-Codes  Outcome Measure Options: AM-PAC 6 Clicks Basic Mobility (PT)  AM-PAC 6 Clicks Score (PT): 8  AM-PAC 6 Clicks Score (OT): 8    Luca Covington PTA  2/12/2021

## 2021-02-12 NOTE — PLAN OF CARE
Goal Outcome Evaluation:  Plan of Care Reviewed With: patient  Progress: no change  Outcome Summary: PT eval completed. Patient is s/p R hip hemiarthroplasty after sustaining a hip fx from a fall. He presents with deficits in ROM, strength, balance, endurance and independence. He is expected to benefit from continued skilled PT intervention to improve his mobility status prior to D/C.

## 2021-02-12 NOTE — PLAN OF CARE
Goal Outcome Evaluation:  Plan of Care Reviewed With: patient  Progress: no change  Outcome Summary: OT eval completed. Patient presents in bed with hip wedge pillow in place, but with knees completely flexed and R hip internally rotated. Patient required TD x 2 assist to reposition in bed and move to EOB, sat EOB 10 mins with CGA. Patient is dependent for all ADL tasks, can self feed with VCs for pacing. No further OT needs at this time as patient is at baseline functional performance. Patient to continued with PT services to address mobility deficits.

## 2021-02-12 NOTE — PROGRESS NOTES
Orthopedic Post Op Hip Hemiarthroplasty Progress Note      Status post-right  hip hemiarthroplasty:     Complications: Restricted Motion and prior history of contracture of RLE   Plan right hip and pelvis xrays, portable ok if  necessary.    Activity: Wbat pending xrays right hip and pelvis    Weight Bearing: WBAT     LOS: 2 days     Subjective     Post-Operative Day: 1 post-right  hip hemirthroplasty  Systemic or Specific Complaints: Pain Control faire    Objective     Vital signs in last 24 hours:  Temp:  [97.7 °F (36.5 °C)-98.6 °F (37 °C)] 97.7 °F (36.5 °C)  Heart Rate:  [] 106  Resp:  [16-18] 18  BP: ()/(55-69) 113/56    General: alert, appears stated age and cooperative   Neurovascular: NV status intact, intact cap refill   Wound: Wound clean and dry no evidence of infection.   Range of Motion:  Limited ER of RLE with contracture noted at the right hip.   DVT Exam: No evidence of DVT seen on physical exam.     Data Review  CBC:  Results from last 7 days   Lab Units 02/12/21  0625   WBC 10*3/mm3 4.41   RBC 10*6/mm3 3.18*   HEMOGLOBIN g/dL 10.8*   HEMATOCRIT % 32.2*   PLATELETS 10*3/mm3 133*           Eros Davis PA-C    Date: 2/12/2021  Time: 18:22 EST

## 2021-02-12 NOTE — CONSULTS
Patient: Bossman Luna  Procedure(s):  HIP HEMIARTHROPLASTY  Anesthesia type: general with block    Patient location: [facility]  Last vitals:   Vitals:    02/12/21 1133   BP: 112/65   Pulse: 109   Resp: 18   Temp: 98.2 °F (36.8 °C)   SpO2: 91%     Level of consciousness: awake, alert and oriented    Post-anesthesia pain: adequate analgesia  Airway patency: patent  Respiratory: unassisted  Cardiovascular: stable and blood pressure at baseline  Hydration: euvolemic    Anesthetic complications: no, pt at baseline, caregiver reports patient appears to be comfortable

## 2021-02-12 NOTE — THERAPY EVALUATION
Patient Name: Bossman Luna  : 1965    MRN: [id number]                              Today's Date: 2021       Admit Date: 2/10/2021    Visit Dx:     ICD-10-CM ICD-9-CM   1. Closed fracture of right hip, initial encounter (CMS/HCC)  S72.001A 820.8   2. Closed fracture of hip (CMS/HCC)  S72.009A 820.8     Patient Active Problem List   Diagnosis   • Closed fracture of right hip (CMS/[facility])   • Intellectual disability   • Anxiety associated with depression   • Neurogenic bladder   • Hypothyroidism (acquired)   • Fall     Past Medical History:   Diagnosis Date   • BPH with urinary obstruction    • Cataract     bilateral   • Development delay    • GERD (gastroesophageal reflux disease)    • Glaucoma    • Hypertension    • Impaired functional mobility, balance, gait, and endurance    • Neurogenic bladder    • Osteoporosis    • Prostate disorder    • Rash    • Self-catheterizes urinary bladder    • Thyroid disease    • Urinary retention      Past Surgical History:   Procedure Laterality Date   • CATARACT EXTRACTION W/ INTRAOCULAR LENS IMPLANT Left 3/9/2020    Procedure: CATARACT PHACO EXTRACTION WITH INTRAOCULAR LENS IMPLANT LEFT;  Surgeon: Winifred Peraza MD;  Location: [facility];  Service: Ophthalmology;  Laterality: Left;   • COLONOSCOPY     • ENDOSCOPY     • HIP HEMIARTHROPLASTY Right 2021    Procedure: HIP HEMIARTHROPLASTY;  Surgeon: Go Sagastume MD;  Location: [facility];  Service: Orthopedics;  Laterality: Right;     General Information     Row Name 21 1220 21 1217       OT Time and Intention    Document Type  discharge evaluation/summary  -SD  evaluation  -SD    Mode of Treatment  --  occupational therapy  -SD    Row Name 21 1217          General Information    Patient Profile Reviewed  yes  -SD     Prior Level of Function  dependent:;ADL's  -SD     Existing Precautions/Restrictions  fall  -SD     Barriers to Rehab  medically complex;previous functional deficit;cognitive  status  -SD     Row Name 02/12/21 1217          Living Environment    Lives With  facility resident  -SD     Row Name 02/12/21 1217          Home Main Entrance    Number of Stairs, Main Entrance  none  -SD     Row Name 02/12/21 1217          Stairs Within Home, Primary    Number of Stairs, Within Home, Primary  none  -SD     Row Name 02/12/21 1217          Cognition    Orientation Status (Cognition)  oriented to;person  -SD     Row Name 02/12/21 1217          Safety Issues, Functional Mobility    Safety Issues Affecting Function (Mobility)  safety precautions follow-through/compliance;safety precaution awareness;insight into deficits/self-awareness;awareness of need for assistance;judgment;impulsivity;ability to follow commands;problem-solving;sequencing abilities  -SD     Impairments Affecting Function (Mobility)  balance;cognition;coordination;endurance/activity tolerance;grasp;motor control;motor planning;pain;postural/trunk control;strength  -SD     Cognitive Impairments, Mobility Safety/Performance  attention;awareness, need for assistance;insight into deficits/self-awareness;judgment;impulsivity;safety precaution awareness;safety precaution follow-through;sequencing abilities;problem-solving/reasoning  -SD       User Key  (r) = Recorded By, (t) = Taken By, (c) = Cosigned By    Initials Name Provider Type    SD Sharon Simpson OT Occupational Therapist          Mobility/ADL's     Row Name 02/12/21 1219          Bed Mobility    Bed Mobility  supine-sit;sit-supine  -SD     Supine-Sit [place] (Bed Mobility)  dependent (less than 25% patient effort);2 person assist  -SD     Sit-Supine [place] (Bed Mobility)  dependent (less than 25% patient effort);2 person assist  -SD     Bed Mobility, Safety Issues  decreased use of legs for bridging/pushing;decreased use of arms for pushing/pulling;impaired trunk control for bed mobility  -SD     Assistive Device (Bed Mobility)  bed rails;draw sheet;head of bed  elevated  -SD     Comment (Bed Mobility)  EOB 10 mins CGA  -[facility] 02/12/21 1220          Transfers    Comment (Transfers)  NT  -SD     Row Name 02/12/21 1220          Functional Mobility    Functional Mobility- Comment  NT  -SD     Row Name 02/12/21 1220          Activities of Daily Living    BADL Assessment/Intervention  bathing;upper body dressing;lower body dressing;grooming;feeding;toileting  -SD     Row Name 02/12/21 1219          Mobility    Extremity Weight-bearing Status  right lower extremity  -SD     Right Lower Extremity (Weight-bearing Status)  weight-bearing as tolerated (WBAT)  -SD     Row Name 02/12/21 1220          Bathing Assessment/Intervention    [place] Level (Bathing)  dependent (less than 25% patient effort)  -SD     Row Name 02/12/21 1220          Upper Body Dressing Assessment/Training    [place] Level (Upper Body Dressing)  dependent (less than 25% patient effort)  -SD     Row Name 02/12/21 1220          Lower Body Dressing Assessment/Training    [place] Level (Lower Body Dressing)  dependent (less than 25% patient effort)  -SD     Row Name 02/12/21 1220          Grooming Assessment/Training    [place] Level (Grooming)  dependent (less than 25% patient effort)  -SD     Row Name 02/12/21 1220          Self-Feeding Assessment/Training    [place] Level (Feeding)  supervision;standby assist;contact guard assist  -SD     Row Name 02/12/21 1220          Toileting Assessment/Training    [place] Level (Toileting)  dependent (less than 25% patient effort)  -SD       User Key  (r) = Recorded By, (t) = Taken By, (c) = Cosigned By    Initials Name Provider Type    Sharon Dick OT Occupational Therapist        Obj/Interventions     Row Name 02/12/21 1221          Range of Motion Comprehensive    General Range of Motion  bilateral upper extremity ROM WFL  -SD     Row Name 02/12/21 1221          Strength Comprehensive (MMT)    Comment, General Manual Muscle  Testing (MMT) Assessment  Patient uses arms functionally, not able to follow commands to complete MMT  -SD       User Key  (r) = Recorded By, (t) = Taken By, (c) = Cosigned By    Initials Name Provider Type    Sharon Dick OT Occupational Therapist        Goals/Plan    No documentation.       Clinical Impression     Row Name 02/12/21 1221          Pain Assessment    Additional Documentation  Pain Scale: FACES Pre/Post-Treatment (Group)  -SD     Row Name 02/12/21 1221          Pain Scale: FACES Pre/Post-Treatment    Pain: FACES Scale, Pretreatment  6-->hurts even more  -SD     Posttreatment Pain Rating  4-->hurts little more  -SD     Pain Location - Side  Right  -SD     Pain Location - Orientation  lower  -SD     Pain Location  extremity  -SD     Row Name 02/12/21 1221          Plan of Care Review    Plan of Care Reviewed With  patient  -SD     Progress  no change  -SD     Outcome Summary  OT eval completed. Patient presents in bed with hip wedge pillow in place, but with knees completely flexed and R hip internally rotated. Patient required TD x 2 assist to reposition in bed and move to EOB, sat EOB 10 mins with CGA. Patient is dependent for all ADL tasks, can self feed with VCs for pacing. No further OT needs at this time as patient is at baseline functional performance. Patient to continued with PT services to address mobility deficits.  -SD     Row Name 02/12/21 1221          Therapy Assessment/Plan (OT)    Therapy Frequency (OT)  evaluation only  -SD     Row Name 02/12/21 1221          Therapy Plan Review/Discharge Plan (OT)    Anticipated Discharge Disposition (OT)  inpatient rehabilitation facility  -SD     Row Name 02/12/21 1221          Positioning and Restraints    Pre-Treatment Position  in bed  -SD     Post Treatment Position  bed  -SD     In Bed  fowlers;call light within reach;encouraged to call for assist;exit alarm on;notified nsg  -SD       User Key  (r) = Recorded By, (t) = Taken By, (c) =  Cosigned By    Initials Name Provider Type    SD Sharon Simpson OT Occupational Therapist        Outcome Measures     Row Name 02/12/21 1225          How much help from another is currently needed...    Putting on and taking off regular lower body clothing?  1  -SD     Bathing (including washing, rinsing, and drying)  1  -SD     Toileting (which includes using toilet bed pan or urinal)  1  -SD     Putting on and taking off regular upper body clothing  1  -SD     Taking care of personal grooming (such as brushing teeth)  1  -SD     Eating meals  3  -SD     AM-PAC 6 Clicks Score (OT)  8  -SD     Row Name 02/12/21 1225          Functional Assessment    Outcome Measure Options  AM-PAC 6 Clicks Daily Activity (OT)  -SD       User Key  (r) = Recorded By, (t) = Taken By, (c) = Cosigned By    Initials Name Provider Type    Sharon Dick OT Occupational Therapist        Occupational Therapy Education                 Title: PT OT SLP Therapies (In Progress)     Topic: Occupational Therapy (Done)     Point: ADL training (Done)     Description:   Instruct learner(s) on proper safety adaptation and remediation techniques during self care or transfers.   Instruct in proper use of assistive devices.              Learning Progress Summary           Patient Acceptance, E,TB, VU by SD at 2/12/2021 1225    Comment: No OT needs.                               User Key     Initials Effective Dates Name Provider Type Discipline    SD 03/07/18 -  Sharon Simpson OT Occupational Therapist OT              OT Recommendation and Plan  Therapy Frequency (OT): evaluation only  Plan of Care Review  Plan of Care Reviewed With: patient  Progress: no change  Outcome Summary: OT eval completed. Patient presents in bed with hip wedge pillow in place, but with knees completely flexed and R hip internally rotated. Patient required TD x 2 assist to reposition in bed and move to EOB, sat EOB 10 mins with CGA. Patient is dependent for all ADL  tasks, can self feed with VCs for pacing. No further OT needs at this time as patient is at baseline functional performance. Patient to continued with PT services to address mobility deficits.     Time Calculation:   Time Calculation- OT     Row Name 02/12/21 1226             Time Calculation- OT    OT Start Time  1000  -SD      OT Received On  02/12/21  -SD      OT Goal Re-Cert Due Date  02/22/21  -SD        User Key  (r) = Recorded By, (t) = Taken By, (c) = Cosigned By    Initials Name Provider Type    Sahron Dick OT Occupational Therapist        Therapy Charges for Today     Code Description Service Date Service Provider Modifiers Qty    [id number]  OT EVAL MOD COMPLEXITY 3 2/12/2021 Sharon Simpson OT GO 1               Sharon Simpson OT  2/12/2021

## 2021-02-12 NOTE — PLAN OF CARE
Goal Outcome Evaluation:  Plan of Care Reviewed With: patient, caregiver  Progress: improving  Outcome Summary: Pt tolerated treatment well. Pt was positioned in bed straight with adequate symmetry noted in B LE.  Pt was max a x 2 for supine to sit as well as for transfer from bed to chair.  During bed mobility abd pillow was kept in place to decrease possibility of breaking THP. Due to patients cognition pt was unable to comprehend THP and reasons for  maintaining. See flowsheet for details.

## 2021-02-12 NOTE — PROGRESS NOTES
[facility]    Name:  Bossman Luna   Age:  55 y.o.  Sex:  male  :  1965  MRN:  [id number]   Visit Number:  [id number]  Admission Date:  2/10/2021  Date Of Service:  21  Primary Care Physician:  Hanna Quinones APRN     LOS: 2 days :  Patient Care Team:  Hanna Quinones APRN as PCP - General (Family Medicine):    Chief Complaint:      Hip fracture    Subjective / Interval History:     Patient seen and examined resting comfortably in bed.  Very pleasant and drinking his coffee.  Caretaker is at bedside.  Patient doing well postop.  Encourage work with PT.  No acute events overnight.    Review of Systems:     Unable to obtain due to clinical presentation    Vital Signs:    Temp:  [97.5 °F (36.4 °C)-98.7 °F (37.1 °C)] 98.2 °F (36.8 °C)  Heart Rate:  [] 109  Resp:  [10-18] 18  BP: ()/(49-69) 112/65    Intake and output:    I/O last 3 completed shifts:  In: 1460 [P.O.:360; I.V.:1100]  Out: 3500 [Urine:3200; Blood:300]  I/O this shift:  In: 480 [P.O.:480]  Out: -     Physical Examination:    General Appearance:  Alert and cooperative, not in any acute distress.   Head:  Atraumatic and normocephalic, without obvious abnormality.   Eyes:          PERRLA, conjunctivae and sclerae normal, No pallor. Extraocular movements are within normal limits.   Neck: Supple, trachea midline, no thyromegaly, no carotid bruit.   Lungs:   Chest shape is normal. Breath sounds heard bilaterally equally.  No crackles or wheezing.    Heart:  Normal S1 and S2, no murmur,  No JVD   Abdomen:   Normal bowel sounds,  Soft, nontender, nondistended, no guarding, no rebound tenderness.   Extremities: no edema, no cyanosis, no clubbing.   Skin: No bleeding, bruising or rash.   Neurologic: Awake, alert.      Laboratory results:    Results from last 7 days   Lab Units 21  0625 02/10/21  8767   SODIUM mmol/L 135* 131*   POTASSIUM mmol/L 3.7 3.4*   CHLORIDE mmol/L 98  94*   CO2 mmol/L 29.9* 29.7*   BUN mg/dL 6 15   CREATININE mg/dL 0.70* 0.78   CALCIUM mg/dL 8.2* 8.3*   BILIRUBIN mg/dL  --  0.3   ALK PHOS U/L  --  59   ALT (SGPT) U/L  --  16   AST (SGOT) U/L  --  20   GLUCOSE mg/dL 112* 105*     Results from last 7 days   Lab Units 02/12/21  0625 02/10/21  2237   WBC 10*3/mm3 4.41 7.23   HEMOGLOBIN g/dL 10.8* 12.6*   HEMATOCRIT % 32.2* 37.2*   PLATELETS 10*3/mm3 133* 156     Results from last 7 days   Lab Units 02/10/21  2237   INR  1.10                   I have reviewed the patient's laboratory results.    Radiology results:    Imaging Results (Last 24 Hours)     ** No results found for the last 24 hours. **          I have reviewed the patient's radiology reports.    Medication Review:     I have reviewed the patient's active and prn medications.       Closed fracture of right hip (CMS/HCC)    Intellectual disability    Anxiety associated with depression    Neurogenic bladder    Hypothyroidism (acquired)    Fall      Assessment:      Closed fracture of right hip    Intellectual disability    Anxiety associated with depression    Neurogenic bladder    Hypothyroidism (acquired)    Fall    Plan:    Continue to monitor in the hospital. Patient underwent surgical intervention by Dr. Sagastume on 2/11, no post-op complications. Did have expected post-op blood loss, but does not meet criteria for PRBC transfusion. Continue PT/OT, supportive care.  Appreciate case management's assistance with discharge planning.  Patient will need outpatient rehab.  The patient requires positioning of the body in ways not feasible with an ordinary bed in order to alleviate pain.  Hospital bed is recommended.  Further orders as clinical course dictates.  Anticipate discharge in 24 to 48 hours.    Juan J Vital,   02/12/21  14:41 EST    Dictated utilizing Dragon dictation.

## 2021-02-12 NOTE — DISCHARGE PLACEMENT REQUEST
"Home health referral  ; Abby Schuler [phone]      Von Sue (55 y.o. Male)     Date of Birth Social Security Number Address Home Phone MRN    1965  [address]  [facility] [address] [phone] [id number]    Amish Marital Status          None Single       Admission Date Admission Type Admitting Provider Attending Provider Department, Room/Bed    2/10/21 Emergency Juan J Vital DO Shields, Morgan Blanton, DO [facility] MED SURG  4, 426/1    Discharge Date Discharge Disposition Discharge Destination                       Attending Provider: Juan J Vital DO    Allergies: No Known Allergies    Isolation: None   Infection: None   Code Status: CPR    Ht: 64 cm (25.2\")   Wt: 54.4 kg (120 lb)    Admission Cmt: None   Principal Problem: Closed fracture of right hip (CMS/[facility]) [S72.001A]                 Active Insurance as of 2/10/2021     Primary Coverage     Payor Plan Insurance Group Employer/Plan Group    MEDICARE MEDICARE A & B      Payor Plan Address Payor Plan Phone Number Payor Plan Fax Number Effective Dates    [address] [phone]  5/1/1986 - None Entered    [facility] [id number]       Subscriber Name Subscriber Birth Date Member ID       VON SUE 1965 [id number]           Secondary Coverage     Payor Plan Insurance Group Employer/Plan Group    [place]      Payor Plan Address Payor Plan Phone Number Payor Plan Fax Number Effective Dates    [address] [phone]  7/13/2016 - None Entered    [facility] [id number]       Subscriber Name Subscriber Birth Date Member ID       VON SUE 1965 [id number]                 Emergency Contacts      (Rel.) Home Phone Work Phone Mobile Phone    Tad Burlesonine (Mother) [phone] -- --    Edel Brooke (Care Giver) [phone] -- [phone]    RE BARIRGA (Care Giver) [phone] -- --    Sandy Antony " (Sister) -- -- [phone]               History & Physical      Morales Xie DO at 21 0226              [facility]   HISTORY AND PHYSICAL      Name:  Bossman Luna   Age:  55 y.o.  Sex:  male  :  1965  MRN:  [id number]   Visit Number:  [id number]  Admission Date:  2/10/2021  Date Of Service:  21  Primary Care Physician:  Hanna Quinones APRN    Chief Complaint: Right hip pain and fall    History Of Presenting Illness:  55 M history of intellectual developmental disability who was brought in from care home after he was found on the floor complaining of pain.  No one witnessed the fall, they found him complaining of pain while laying on the floor and inability to bear weight on his right lower extremity. Pt reports pain in his R. Hip, but denies having any other acute issues.  Caregiver is at bedside and reports that he is a full code.  She also reports that they regularly catheterize him about 5 times a day for history of urinary retention and neurogenic bladder.  Murphy was placed in the ER.    Review Of Systems:Unable to obtain due to patient's clinical condition     Past Medical History:  Past Medical History:   Diagnosis Date   • BPH with urinary obstruction    • Cataract     bilateral   • GERD (gastroesophageal reflux disease)    • Glaucoma    • Neurogenic bladder    • Osteoporosis    • Prostate disorder    • Rash    • Thyroid disease    • Urinary retention      Past Surgical history:  Past Surgical History:   Procedure Laterality Date   • CATARACT EXTRACTION W/ INTRAOCULAR LENS IMPLANT Left 3/9/2020    Procedure: CATARACT PHACO EXTRACTION WITH INTRAOCULAR LENS IMPLANT LEFT;  Surgeon: Winifred Peraza MD;  Location: [facility];  Service: Ophthalmology;  Laterality: Left;   • COLONOSCOPY     • ENDOSCOPY       Social History:  Social History     Socioeconomic History   • Marital status: Single     Spouse name: Not on file   • Number of children: Not on file   •  Years of education: Not on file   • Highest education level: Not on file   Occupational History     Employer: UNEMPLOYED   Tobacco Use   • Smoking status: Never Smoker   • Smokeless tobacco: Never Used   Substance and Sexual Activity   • Alcohol use: No   • Drug use: No   • Sexual activity: Never     Family History:  Family History   Family history unknown: Yes     Allergies:  Patient has no known allergies.    Home Medications:  Prior to Admission Medications     Prescriptions Last Dose Informant Patient Reported? Taking?    acetaminophen (TYLENOL) 500 MG tablet   Yes Yes    Take 500 mg by mouth 3 (Three) Times a Day. 0800, 1200, 2000    Budesonide (ENTOCORT EC) 3 MG 24 hr capsule  Self Yes Yes    Take 3 mg by mouth Daily.    calcium citrate-vitamin d (CITRACAL) 200-250 MG-UNIT tablet tablet   Yes Yes    Take 1 tablet by mouth Daily.    clonazePAM (KlonoPIN) 0.5 MG tablet  Self Yes Yes    Take 0.5 mg by mouth 4 (Four) Times a Day. 0800, 1100, 1400, nightly    docusate sodium (COLACE) 250 MG capsule  Self Yes Yes    Take 250 mg by mouth Every Night.    FLUoxetine (PROzac) 20 MG capsule  Self Yes Yes    Take 20 mg by mouth Daily.    fluticasone (FLONASE) 50 MCG/ACT nasal spray  Self Yes Yes    2 sprays into the nostril(s) as directed by provider Daily.    levothyroxine (SYNTHROID, LEVOTHROID) 100 MCG tablet  Self Yes Yes    Take 88 mcg by mouth Daily.    losartan (COZAAR) 25 MG tablet   Yes Yes    Take 25 mg by mouth Every Night.    Melatonin 3 MG capsule  Self Yes No    Take 2 capsules by mouth Every Night. 2 qhs     melatonin 5 MG tablet tablet   Yes Yes    Take 10 mg by mouth Every Night.    metoprolol succinate XL (TOPROL-XL) 25 MG 24 hr tablet  Self Yes No    Take 25 mg by mouth Daily.    metoprolol tartrate (LOPRESSOR) 25 MG tablet  Self Yes Yes    Take 25 mg by mouth Daily.    Multiple Vitamin (MULTIVITAMIN) tablet  Self Yes No    Take 1 tablet by mouth Daily.    multivitamin with minerals (MULTIVITAMIN ADULT  PO)   Yes Yes    Take 1 tablet by mouth Daily.    Nutritional Supplements (BOOST SMOOTHIE PO)  Self Yes Yes    Take 1 can by mouth 2 (Two) Times a Day As Needed.    oyster shell calcium 500 MG tablet tablet  Self Yes No    Take 500 mg by mouth Daily.    pantoprazole (PROTONIX) 40 MG EC tablet  Self Yes Yes    Take 40 mg by mouth Every Night.    potassium chloride (K-DUR,KLOR-CON) 10 MEQ CR tablet  Self Yes Yes    Take 10 mEq by mouth Daily.    risperiDONE (risperDAL) 2 MG tablet  Self Yes Yes    Take 2 mg by mouth Daily.    risperiDONE (RisperDAL) 3 MG tablet  Self Yes Yes    Take 4 mg by mouth Every Night.    tamsulosin (FLOMAX) 0.4 MG capsule 24 hr capsule  Self No Yes    TAKE ONE CAPSULE BY MOUTH NIGHTLY AT BEDTIME    Patient taking differently:  Take 1 capsule by mouth Every Night.    zolpidem (AMBIEN) 5 MG tablet  Self Yes Yes    Take 10 mg by mouth At Night As Needed.         ED Medications:  Medications   sodium chloride 0.9 % flush 10 mL (has no administration in time range)   fentaNYL citrate (PF) (SUBLIMAZE) injection 25 mcg (25 mcg Intravenous Given 2/10/21 2137)   ondansetron (ZOFRAN) injection 4 mg (4 mg Intravenous Given 2/10/21 2135)     Vital Signs:  Temp:  [98.4 °F (36.9 °C)-98.6 °F (37 °C)] 98.4 °F (36.9 °C)  Heart Rate:  [89-93] 93  Resp:  [18] 18  BP: (108-120)/(54-91) 108/54        02/10/21  2056   Weight: 54.4 kg (120 lb)     Physical Exam:  General: NAD, resting in bed  HEENT: EOMI, NC/AT  Heart: regular  Lungs: nonlabored  Abdomen: Soft, nondistended, nontender  Extremities: No edema  MSK: Tenderness to palpation along the right hip  Neurological: A&O to person, not place or time, moves extremities  Psychological: Pleasantly confused  Skin: warm, dry    Laboratory data:I have reviewed the labs done in the emergency room.    Results from last 7 days   Lab Units 02/10/21  2237   SODIUM mmol/L 131*   POTASSIUM mmol/L 3.4*   CHLORIDE mmol/L 94*   CO2 mmol/L 29.7*   BUN mg/dL 15   CREATININE mg/dL  0.78   CALCIUM mg/dL 8.3*   BILIRUBIN mg/dL 0.3   ALK PHOS U/L 59   ALT (SGPT) U/L 16   AST (SGOT) U/L 20   GLUCOSE mg/dL 105*     Results from last 7 days   Lab Units 02/10/21  2237   WBC 10*3/mm3 7.23   HEMOGLOBIN g/dL 12.6*   HEMATOCRIT % 37.2*   PLATELETS 10*3/mm3 156     Results from last 7 days   Lab Units 02/10/21  2237   INR  1.10     Pain Management Panel     There is no flowsheet data to display.        EKG:  Ordered and pending    Radiology:  Imaging Results (Last 72 Hours)     Procedure Component Value Units Date/Time    XR Hips Bilateral With or Without Pelvis 2 View [[id number]] Resulted: 02/10/21 2155     Updated: 02/10/21 2155      Personally reviewed the x-ray hip which reveals a right-sided intertrochanteric fracture    Assessment:    Closed fracture of right hip (CMS/HCC)    Intellectual disability    Anxiety associated with depression    Neurogenic bladder    Hypothyroidism (acquired)    Fall    Plan:  -NPO, pain control, orthopedics evaluation in AM  -resume home meds occluding antidepressants and antipsychotics  -PT OT and DVT ppx postop  -inpt, full code, high risk 2/2 IV narcotic requirement    Morales Xie DO  02/11/21  02:33 EST    Dictated utilizing Dragon dictation.      Electronically signed by Morales Xie DO at 02/11/21 0234          Emergency Department Notes      Gaby Greene APRN at 02/10/21 2111     Attestation signed by Leni Khan MD at 02/10/21 4939          For this patient encounter, I reviewed the NP or PA documentation, treatment plan, and medical decision making. Leni Khan MD 2/10/2021 23:46 EST                  Subjective   History of Present Illness  This is a 55 year old developmentally delayed male who was brought in by EMS after a slip and fall. He reports right hip pain and was unable to bear weight.   Review of Systems   Constitutional: Negative.    HENT: Negative.    Eyes: Negative.    Respiratory: Negative.    Cardiovascular:  Negative.    Gastrointestinal: Negative.    Genitourinary: Negative.    Musculoskeletal: Positive for arthralgias.   Skin: Negative.    Neurological: Negative.    Psychiatric/Behavioral: Negative.        Past Medical History:   Diagnosis Date   • BPH with urinary obstruction    • Cataract     bilateral   • GERD (gastroesophageal reflux disease)    • Glaucoma    • Neurogenic bladder    • Osteoporosis    • Prostate disorder    • Rash    • Thyroid disease    • Urinary retention        No Known Allergies    Past Surgical History:   Procedure Laterality Date   • CATARACT EXTRACTION W/ INTRAOCULAR LENS IMPLANT Left 3/9/2020    Procedure: CATARACT PHACO EXTRACTION WITH INTRAOCULAR LENS IMPLANT LEFT;  Surgeon: Winifred Peraza MD;  Location: [facility];  Service: Ophthalmology;  Laterality: Left;   • COLONOSCOPY     • ENDOSCOPY         Family History   Family history unknown: Yes       Social History     Socioeconomic History   • Marital status: Single     Spouse name: Not on file   • Number of children: Not on file   • Years of education: Not on file   • Highest education level: Not on file   Occupational History     Employer: UNEMPLOYED   Tobacco Use   • Smoking status: Never Smoker   • Smokeless tobacco: Never Used   Substance and Sexual Activity   • Alcohol use: No   • Drug use: No   • Sexual activity: Never           Objective   Physical Exam  Vitals signs and nursing note reviewed.   Constitutional:       Appearance: Normal appearance. He is normal weight.   Neurological:      Mental Status: He is alert.     Primary survey  Airway patent  Bilateral breath sounds  Strong radial and femoral pulses  GCS 15    Secondary survey  general: patient appears uncomfortable, nontoxic  Head: Atraumatic, normocephalic  Eyes: Pupils equal round reactive to light, extra ocular movements are intact, vision grossly normal  ENT: No facial step-offs, no dental malocclusion  Neck: Nontender to palpation of the C-spine, full range of motion,  trachea midline  Chest: Nontender to palpation  Cardiovascular: Regular rate  Lungs: Bilateral breath sounds, clear to auscultation bilaterally  Back: T and L-spines are nontender to palpation, no step offs  Abdomen: Soft, nontender, nondistended  Pelvis: Stable  Extremities: right hip tender with limited ROM due to pain.   Neuro: Sensation grossly intact to light touch in all 4 extremities        Procedures          ED Course  ED Course as of Feb 10 2206   Wed Feb 10, 2021   2154 Consult with Dr. Sagastume, will accept for ortho surgery consult. Keep NPO after midnight.     [TW]   2205 Consult Dr. Xie will accept for admission.     [TW]      ED Course User Index  [TW] Gaby Greene, APRN                                           MDM  Number of Diagnoses or Management Options     Amount and/or Complexity of Data Reviewed  Tests in the radiology section of CPT®: ordered and reviewed  Review and summarize past medical records: yes  Discuss the patient with other providers: yes  Independent visualization of images, tracings, or specimens: yes    Risk of Complications, Morbidity, and/or Mortality  Presenting problems: moderate  Diagnostic procedures: moderate  Management options: moderate        Final diagnoses:   Closed fracture of right hip, initial encounter (CMS/[facility])            Gaby Greene, DOC  02/10/21 2206      Electronically signed by Leni Khan MD at 02/10/21 2346     Jarret Hernandez at 02/10/21 2206        Called house supervisor for bed assignment, patient will be going to room 426.     Jarret Hernandez  02/10/21 2209      Electronically signed by Jarret Hernandez at 02/10/21 2209         Current Facility-Administered Medications   Medication Dose Route Frequency Provider Last Rate Last Admin   • acetaminophen (TYLENOL) tablet 650 mg  650 mg Oral Q4H PRN Go Sagastume MD        Or   • acetaminophen (TYLENOL) 160 MG/5ML solution 650 mg  650 mg Oral Q4H PRN Go Sagastume MD        Or    • acetaminophen (TYLENOL) suppository 650 mg  650 mg Rectal Q4H PRN Go Sagastume MD       • Budesonide (ENTOCORT EC) 24 hr capsule 3 mg  3 mg Oral Daily Go Sagastume MD   3 mg at 02/12/21 0808   • clonazePAM (KlonoPIN) tablet 0.5 mg  0.5 mg Oral Q8H Go Sagastume MD   0.5 mg at 02/12/21 1415   • docusate sodium (COLACE) capsule 100 mg  100 mg Oral Daily Go Sagastume MD   100 mg at 02/12/21 0808   • enoxaparin (LOVENOX) syringe 40 mg  40 mg Subcutaneous Daily Go Sagastume MD   40 mg at 02/12/21 0834   • FLUoxetine (PROzac) capsule 20 mg  20 mg Oral Daily Go Sagastume MD   20 mg at 02/12/21 0808   • fluticasone (FLONASE) 50 MCG/ACT nasal spray 2 spray  2 spray Nasal Daily Go Sagastume MD   2 spray at 02/12/21 0808   • HYDROcodone-acetaminophen (NORCO) 7.5-325 MG per tablet 1 tablet  1 tablet Oral Q4H PRN Go Sagastume MD   1 tablet at 02/12/21 1216   • lactated ringers infusion 1,000 mL  1,000 mL Intravenous Continuous Go Sagastume MD 25 mL/hr at 02/11/21 1219 New Bag at 02/11/21 1400   • levothyroxine (SYNTHROID, LEVOTHROID) tablet 88 mcg  88 mcg Oral Daily Go Sagastume MD   88 mcg at 02/12/21 0808   • melatonin tablet 10 mg  10 mg Oral Nightly Go Sagastume MD   10 mg at 02/11/21 2148   • metoprolol succinate XL (TOPROL-XL) 24 hr tablet 25 mg  25 mg Oral Daily Go Sagastume MD   25 mg at 02/12/21 0807   • morphine injection 4 mg  4 mg Intravenous Q2H PRN Go Sagastume MD        And   • naloxone (NARCAN) injection 0.4 mg  0.4 mg Intravenous Q5 Min PRN Go Sagastume MD       • Morphine sulfate (PF) injection 2 mg  2 mg Intravenous Q2H PRN Go Sagastume MD   2 mg at 02/11/21 0866   • ondansetron (ZOFRAN) injection 4 mg  4 mg Intravenous Q6H PRN Go Sagastume MD       • pantoprazole (PROTONIX) EC tablet 40 mg  40 mg Oral Nightly Go Sagastume MD   40 mg at 02/11/21 1692   • risperiDONE (risperDAL) tablet 2 mg  2 mg Oral Daily Go Sagastume MD   2 mg at  21 0808   • risperiDONE (risperDAL) tablet 4 mg  4 mg Oral Nightly Go Sagastume MD   4 mg at 21   • sodium chloride 0.9 % flush 10 mL  10 mL Intravenous PRN Go Sagastume MD       • sodium chloride 0.9 % flush 10 mL  10 mL Intravenous Q12H Go Sagastume MD   10 mL at 21   • sodium chloride 0.9 % flush 10 mL  10 mL Intravenous PRN Go Sagastume MD       • tamsulosin (FLOMAX) 24 hr capsule 0.4 mg  0.4 mg Oral Nightly Go Sagastume MD   0.4 mg at 21   • zolpidem (AMBIEN) tablet 10 mg  10 mg Oral Nightly PRN Go Sagastume MD            Physician Progress Notes (last 48 hours) (Notes from 02/10/21 1530 through 21 1530)      Juan J Vital DO at 21 1440                [facility]    [facility]    Name:  Bossman Luna   Age:  55 y.o.  Sex:  male  :  1965  MRN:  [id number]   Visit Number:  [id number]  Admission Date:  2/10/2021  Date Of Service:  21  Primary Care Physician:  Hanna Quinones APRN     LOS: 2 days :  Patient Care Team:  Hanna Quinones APRN as PCP - General (Family Medicine):    Chief Complaint:      Hip fracture    Subjective / Interval History:     Patient seen and examined resting comfortably in bed.  Very pleasant and drinking his coffee.  Caretaker is at bedside.  Patient doing well postop.  Encourage work with PT.  No acute events overnight.    Review of Systems:     Unable to obtain due to clinical presentation    Vital Signs:    Temp:  [97.5 °F (36.4 °C)-98.7 °F (37.1 °C)] 98.2 °F (36.8 °C)  Heart Rate:  [] 109  Resp:  [10-18] 18  BP: ()/(49-69) 112/65    Intake and output:    I/O last 3 completed shifts:  In: 1460 [P.O.:360; I.V.:1100]  Out: 3500 [Urine:3200; Blood:300]  I/O this shift:  In: 480 [P.O.:480]  Out: -     Physical Examination:    General Appearance:  Alert and cooperative, not in any acute distress.   Head:  Atraumatic and normocephalic, without  obvious abnormality.   Eyes:          PERRLA, conjunctivae and sclerae normal, No pallor. Extraocular movements are within normal limits.   Neck: Supple, trachea midline, no thyromegaly, no carotid bruit.   Lungs:   Chest shape is normal. Breath sounds heard bilaterally equally.  No crackles or wheezing.    Heart:  Normal S1 and S2, no murmur,  No JVD   Abdomen:   Normal bowel sounds,  Soft, nontender, nondistended, no guarding, no rebound tenderness.   Extremities: no edema, no cyanosis, no clubbing.   Skin: No bleeding, bruising or rash.   Neurologic: Awake, alert.      Laboratory results:    Results from last 7 days   Lab Units 02/12/21  0625 02/10/21  2237   SODIUM mmol/L 135* 131*   POTASSIUM mmol/L 3.7 3.4*   CHLORIDE mmol/L 98 94*   CO2 mmol/L 29.9* 29.7*   BUN mg/dL 6 15   CREATININE mg/dL 0.70* 0.78   CALCIUM mg/dL 8.2* 8.3*   BILIRUBIN mg/dL  --  0.3   ALK PHOS U/L  --  59   ALT (SGPT) U/L  --  16   AST (SGOT) U/L  --  20   GLUCOSE mg/dL 112* 105*     Results from last 7 days   Lab Units 02/12/21  0625 02/10/21  2237   WBC 10*3/mm3 4.41 7.23   HEMOGLOBIN g/dL 10.8* 12.6*   HEMATOCRIT % 32.2* 37.2*   PLATELETS 10*3/mm3 133* 156     Results from last 7 days   Lab Units 02/10/21  2237   INR  1.10                   I have reviewed the patient's laboratory results.    Radiology results:    Imaging Results (Last 24 Hours)     ** No results found for the last 24 hours. **          I have reviewed the patient's radiology reports.    Medication Review:     I have reviewed the patient's active and prn medications.       Closed fracture of right hip (CMS/HCC)    Intellectual disability    Anxiety associated with depression    Neurogenic bladder    Hypothyroidism (acquired)    Fall      Assessment:      Closed fracture of right hip    Intellectual disability    Anxiety associated with depression    Neurogenic bladder    Hypothyroidism (acquired)    Fall    Plan:    Continue to monitor in the hospital. Patient  underwent surgical intervention by Dr. Sagastume on 2/11, no post-op complications. Did have expected post-op blood loss, but does not meet criteria for PRBC transfusion. Continue PT/OT, supportive care.  Appreciate case management's assistance with discharge planning.  Patient will need outpatient rehab.  Further orders as clinical course dictates.  Anticipate discharge in 24 to 48 hours.    Juan J Vital DO  02/12/21  14:41 EST    Dictated utilizing Dragon dictation.      Electronically signed by Juan J Vital DO at 02/12/21 1518          Consult Notes (last 48 hours) (Notes from 02/10/21 1530 through 02/12/21 1530)      Jing Lu CRNA at 02/12/21 1455      Consult Orders    1. Anesthesia Follow-Up [[id number]] ordered by Tripp Dickey CRNA at 02/11/21 1349               Patient: Bossman Luna  Procedure(s):  HIP HEMIARTHROPLASTY  Anesthesia type: general with block    Patient location: [facility] Surgical Floor  Last vitals:   Vitals:    02/12/21 1133   BP: 112/65   Pulse: 109   Resp: 18   Temp: 98.2 °F (36.8 °C)   SpO2: 91%     Level of consciousness: awake, alert and oriented    Post-anesthesia pain: adequate analgesia  Airway patency: patent  Respiratory: unassisted  Cardiovascular: stable and blood pressure at baseline  Hydration: euvolemic    Anesthetic complications: no, pt at baseline, caregiver reports patient appears to be comfortable    Electronically signed by Jing Lu CRNA at 02/12/21 1456     Go Sagastume MD at 02/11/21 1259      Consult Orders    1. Orthopedics (on-call MD unless specified) [[id number]] ordered by Juan J Vital DO at 02/11/21 0819                   Patient Care Team:  Hanna Quinones APRN as PCP - General (Family Medicine)    Chief complaint right hip pain    Subjective     Patient is a 55 y.o. male presents with right hip pain.  This is a 55-year-old gentleman that is mentally challenged that had sustained injury to  his right hip he had no previous problems with this he is seen at bedside today it was discussed with his mother as well as his caregiver he lives in a group home.  He does ambulate around the house.      Review of Systems   Pertinent items are noted in HPI    History  Past Medical History:   Diagnosis Date   • BPH with urinary obstruction    • Cataract     bilateral   • Development delay    • GERD (gastroesophageal reflux disease)    • Glaucoma    • Hypertension    • Neurogenic bladder    • Osteoporosis    • Prostate disorder    • Rash    • Self-catheterizes urinary bladder    • Thyroid disease    • Urinary retention      Past Surgical History:   Procedure Laterality Date   • CATARACT EXTRACTION W/ INTRAOCULAR LENS IMPLANT Left 3/9/2020    Procedure: CATARACT PHACO EXTRACTION WITH INTRAOCULAR LENS IMPLANT LEFT;  Surgeon: Winifred Peraza MD;  Location: [facility];  Service: Ophthalmology;  Laterality: Left;   • COLONOSCOPY     • ENDOSCOPY       Family History   Family history unknown: Yes     Social History     Tobacco Use   • Smoking status: Never Smoker   • Smokeless tobacco: Never Used   Substance Use Topics   • Alcohol use: No   • Drug use: No     Medications Prior to Admission   Medication Sig Dispense Refill Last Dose   • acetaminophen (TYLENOL) 500 MG tablet Take 500 mg by mouth 3 (Three) Times a Day. 0800, 1200, 2000      • Budesonide (ENTOCORT EC) 3 MG 24 hr capsule Take 3 mg by mouth Daily.      • calcium citrate-vitamin d (CITRACAL) 200-250 MG-UNIT tablet tablet Take 1 tablet by mouth Daily.      • clonazePAM (KlonoPIN) 0.5 MG tablet Take 0.5 mg by mouth 4 (Four) Times a Day. 0800, 1100, 1400, nightly      • docusate sodium (COLACE) 250 MG capsule Take 250 mg by mouth Every Night.      • FLUoxetine (PROzac) 20 MG capsule Take 20 mg by mouth Daily.      • fluticasone (FLONASE) 50 MCG/ACT nasal spray 2 sprays into the nostril(s) as directed by provider Daily.      • levothyroxine (SYNTHROID, LEVOTHROID) 100  MCG tablet Take 88 mcg by mouth Daily.      • losartan (COZAAR) 25 MG tablet Take 25 mg by mouth Every Night.      • melatonin 5 MG tablet tablet Take 10 mg by mouth Every Night.      • metoprolol tartrate (LOPRESSOR) 25 MG tablet Take 25 mg by mouth Daily.      • multivitamin with minerals (MULTIVITAMIN ADULT PO) Take 1 tablet by mouth Daily.      • Nutritional Supplements (BOOST SMOOTHIE PO) Take 1 can by mouth 2 (Two) Times a Day As Needed.      • pantoprazole (PROTONIX) 40 MG EC tablet Take 40 mg by mouth Every Night.      • potassium chloride (K-DUR,KLOR-CON) 10 MEQ CR tablet Take 10 mEq by mouth Daily.      • risperiDONE (risperDAL) 2 MG tablet Take 2 mg by mouth Daily.      • risperiDONE (RisperDAL) 3 MG tablet Take 4 mg by mouth Every Night.      • tamsulosin (FLOMAX) 0.4 MG capsule 24 hr capsule TAKE ONE CAPSULE BY MOUTH NIGHTLY AT BEDTIME (Patient taking differently: Take 1 capsule by mouth Every Night.) 90 capsule 5    • zolpidem (AMBIEN) 5 MG tablet Take 10 mg by mouth At Night As Needed.      • Melatonin 3 MG capsule Take 2 capsules by mouth Every Night. 2 qhs       • metoprolol succinate XL (TOPROL-XL) 25 MG 24 hr tablet Take 25 mg by mouth Daily.      • Multiple Vitamin (MULTIVITAMIN) tablet Take 1 tablet by mouth Daily.  11    • oyster shell calcium 500 MG tablet tablet Take 500 mg by mouth Daily.  11      Allergies:  Patient has no known allergies.    Objective     Vital Signs  Temp:  [98.2 °F (36.8 °C)-98.7 °F (37.1 °C)] 98.4 °F (36.9 °C)  Heart Rate:  [80-93] 80  Resp:  [16-18] 16  BP: ()/(54-91) 90/55    Physical Exam:      General Appearance:    Alert, cooperative, in no acute distress   Head:    Normocephalic, without obvious abnormality, atraumatic   Eyes:            Lids and lashes normal, conjunctivae and sclerae normal, no   icterus, no pallor, corneas clear, PERRLA   Ears:    Ears appear intact with no abnormalities noted   Throat:   No oral lesions, no thrush, oral mucosa moist      Neck:   No adenopathy, supple, trachea midline, no thyromegaly, no   carotid bruit, no JVD   Back:     No kyphosis present, no scoliosis present, no skin lesions,      erythema or scars, no tenderness to percussion or                   palpation,   range of motion normal   Lungs:     Clear to auscultation,respirations regular, even and                  unlabored    Heart:    Regular rhythm and normal rate, normal S1 and S2, no            murmur, no gallop, no rub, no click   Chest Wall:    No abnormalities observed   Abdomen:     Normal bowel sounds, no masses, no organomegaly, soft        non-tender, non-distended, no guarding, no rebound                tenderness   Rectal:     Deferred   Extremities:   Moves all extremities well, no edema, no cyanosis, no             redness he is lying on his right side in the fetal position   Pulses:   Pulses palpable and equal bilaterally   Skin:   No bleeding, bruising or rash   Lymph nodes:   No palpable adenopathy   Neurologic:   Cranial nerves 2 - 12 grossly intact, sensation intact, DTR       present and equal bilaterally       Results Review:    I reviewed the patient's new clinical results.    Assessment/Plan       Closed fracture of right hip (CMS/HCC)    Intellectual disability    Anxiety associated with depression    Neurogenic bladder    Hypothyroidism (acquired)    Fall    Plan will be for right hip hemiarthroplasty.  As discussed with the caregiver as well as the mother I am concerned given his cognitive impairment risk of dislocation given ability to follow posterior hip precautions.  I think he would benefit most from hemiarthroplasty to allow for ambulation.  As well as protect as much as possible from dislocation.  It was discussed with the family risk and benefits including infection loosening instability neurovascular injury    I discussed the patients findings and my recommendations with patient, family and Caregiver.     Go Sagastume,  MD  21  12:59 EST      Electronically signed by Go Sagastume MD at 21 1302       Nutrition Notes (last 24 hours) (Notes from 21 1530 through 21 1530)    No notes exist for this encounter.            Physical Therapy Notes (last 24 hours) (Notes from 21 1530 through 21 1530)      Albina Issa, PT at 21 1225  Version 1 of 1       Goal Outcome Evaluation:  Plan of Care Reviewed With: patient  Progress: no change  Outcome Summary: PT eval completed. Patient is s/p R hip hemiarthroplasty after sustaining a hip fx from a fall. He presents with deficits in ROM, strength, balance, endurance and independence. He is expected to benefit from continued skilled PT intervention to improve his mobility status prior to D/C.    Electronically signed by Albina Issa PT at 21 1225     Albina Issa PT at 21 1226  Version 1 of 1         Patient Name: Bossman Luna  : 1965    MRN: [id number]                              Today's Date: 2021       Admit Date: 2/10/2021    Visit Dx:     ICD-10-CM ICD-9-CM   1. Closed fracture of right hip, initial encounter (CMS/[facility])  S72.001A 820.8   2. Closed fracture of hip (CMS/[facility])  S72.009A 820.8     Patient Active Problem List   Diagnosis   • Closed fracture of right hip (CMS/[facility])   • Intellectual disability   • Anxiety associated with depression   • Neurogenic bladder   • Hypothyroidism (acquired)   • Fall     Past Medical History:   Diagnosis Date   • BPH with urinary obstruction    • Cataract     bilateral   • Development delay    • GERD (gastroesophageal reflux disease)    • Glaucoma    • Hypertension    • Impaired functional mobility, balance, gait, and endurance    • Neurogenic bladder    • Osteoporosis    • Prostate disorder    • Rash    • Self-catheterizes urinary bladder    • Thyroid disease    • Urinary retention      Past Surgical History:   Procedure Laterality Date   • CATARACT EXTRACTION W/ INTRAOCULAR  LENS IMPLANT Left 3/9/2020    Procedure: CATARACT PHACO EXTRACTION WITH INTRAOCULAR LENS IMPLANT LEFT;  Surgeon: Winifred Peraza MD;  Location: [facility];  Service: Ophthalmology;  Laterality: Left;   • COLONOSCOPY     • ENDOSCOPY     • HIP HEMIARTHROPLASTY Right 2/11/2021    Procedure: HIP HEMIARTHROPLASTY;  Surgeon: Go Sagastume MD;  Location: [facility];  Service: Orthopedics;  Laterality: Right;     General Information     Row Name 02/12/21 1001          Physical Therapy Time and Intention    Document Type  evaluation  -LM     Mode of Treatment  physical therapy  -LM     Row Name 02/12/21 1001          General Information    Patient Profile Reviewed  yes  -LM     Prior Level of Function  min assist:;all household mobility;transfer  -LM     Existing Precautions/Restrictions  fall  -LM     Barriers to Rehab  medically complex;previous functional deficit;cognitive status Pt has developmental delays.  -LM     Row Name 02/12/21 1001          Living Environment    Lives With  facility resident Vasyl Reeves  -LM     Row Name 02/12/21 1001          Home Main Entrance    Number of Stairs, Main Entrance  none  -LM     Row Name 02/12/21 1001          Stairs Within Home, Primary    Number of Stairs, Within Home, Primary  none  -LM     Row Name 02/12/21 1001          Cognition    Orientation Status (Cognition)  oriented to;person;place;verbal cues/prompts needed for orientation  -LM     Row Name 02/12/21 1001          Safety Issues, Functional Mobility    Safety Issues Affecting Function (Mobility)  ability to follow commands;at risk behavior observed;awareness of need for assistance;impulsivity;insight into deficits/self-awareness;judgment;problem-solving;safety precaution awareness;safety precautions follow-through/compliance  -     Impairments Affecting Function (Mobility)  balance;cognition;coordination;endurance/activity tolerance;strength;grasp;motor control;motor planning;pain;range of motion (ROM)  -LM      Cognitive Impairments, Mobility Safety/Performance  attention;awareness, need for assistance;impulsivity;insight into deficits/self-awareness;judgment;problem-solving/reasoning;safety precaution awareness;safety precaution follow-through  -LM       User Key  (r) = Recorded By, (t) = Taken By, (c) = Cosigned By    Initials Name Provider Type    Albina Jack PT Physical Therapist        Mobility     Row Name 02/12/21 1001          Bed Mobility    Bed Mobility  supine-sit;sit-supine  -LM     Supine-Sit [place] (Bed Mobility)  verbal cues;dependent (less than 25% patient effort);2 person assist  -LM     Sit-Supine [place] (Bed Mobility)  verbal cues;dependent (less than 25% patient effort);2 person assist  -LM     Assistive Device (Bed Mobility)  bed rails;draw sheet;head of bed elevated  -LM     Comment (Bed Mobility)  Pt found in supine with ABD wedge in place. Despite wedge, B LE's held in extreme knee/hip flexion with R LE also held in internal rotation. No evidence of dislocation or increased pain. PT assisted patient to move R LE into safer position but still observed to be held in flexion and internal rotation.  -LM     Row Name 02/12/21 1001          Transfers    Comment (Transfers)  Unable to perform. Patient set EOB x 10 minutes with CGA. Requests assistance back to bed.  -LM     Row Name 02/12/21 1001          Mobility    Extremity Weight-bearing Status  right lower extremity  -LM     Right Lower Extremity (Weight-bearing Status)  weight-bearing as tolerated (WBAT)  -LM       User Key  (r) = Recorded By, (t) = Taken By, (c) = Cosigned By    Initials Name Provider Type    Albina Jack PT Physical Therapist        Obj/Interventions     Row Name 02/12/21 1001          Range of Motion Comprehensive    General Range of Motion  bilateral upper extremity ROM WFL;lower extremity range of motion deficits identified  -LM     Comment, General Range of Motion  R knee valgus; R hip held in flexion and  internal rotation despite ABD pillow in place.L LE WFL.  -LM     Row Name 02/12/21 1001          Strength Comprehensive (MMT)    Comment, General Manual Muscle Testing (MMT) Assessment  Unable to MMT.  -LM     Row Name 02/12/21 1001          Balance    Balance Assessment  sitting static balance;sitting dynamic balance  -LM     Static Sitting Balance  mild impairment;supported;sitting, edge of bed  -LM     Dynamic Sitting Balance  mild impairment;supported;sitting, edge of bed  -LM       User Key  (r) = Recorded By, (t) = Taken By, (c) = Cosigned By    Initials Name Provider Type    LM Albina Issa, PT Physical Therapist        Goals/Plan     Row Name 02/12/21 1001          Bed Mobility Goal 1 (PT)    Activity/Assistive Device (Bed Mobility Goal 1, PT)  bed mobility activities, all;bed rails  -LM     [place] Level/Cues Needed (Bed Mobility Goal 1, PT)  verbal cues required;minimum assist (75% or more patient effort)  -LM     Time Frame (Bed Mobility Goal 1, PT)  long term goal (LTG);10 days  -LM     Progress/Outcomes (Bed Mobility Goal 1, PT)  goal ongoing  -LM     Row Name 02/12/21 1001          Transfer Goal 1 (PT)    Activity/Assistive Device (Transfer Goal 1, PT)  sit-to-stand/stand-to-sit;bed-to-chair/chair-to-bed;walker, rolling  -LM     [place] Level/Cues Needed (Transfer Goal 1, PT)  verbal cues required;moderate assist (50-74% patient effort)  -LM     Time Frame (Transfer Goal 1, PT)  long term goal (LTG);10 days  -LM     Progress/Outcome (Transfer Goal 1, PT)  goal ongoing  -LM     Row Name 02/12/21 1001          Gait Training Goal 1 (PT)    Activity/Assistive Device (Gait Training Goal 1, PT)  gait (walking locomotion);assistive device use;walker, rolling  -LM     [place] Level (Gait Training Goal 1, PT)  verbal cues required;moderate assist (50-74% patient effort)  -LM     Distance (Gait Training Goal 1, PT)  20'  -LM     Time Frame (Gait Training Goal 1, PT)  long term goal (LTG);10 days   -LM     Progress/Outcome (Gait Training Goal 1, PT)  goal ongoing  -LM     Row Name 02/12/21 1001          Patient Education Goal (PT)    Activity (Patient Education Goal, PT)  Pt will perform B LE ther ex x 15 reps.  -LM     [place]/Cues/Accuracy (Memory Goal 2, PT)  demonstrates adequately  -LM     Time Frame (Patient Education Goal, PT)  long term goal (LTG);10 days  -LM     Progress/Outcome (Patient Education Goal, PT)  goal ongoing  -LM       User Key  (r) = Recorded By, (t) = Taken By, (c) = Cosigned By    Initials Name Provider Type    LM Albina Issa, PT Physical Therapist        Clinical Impression     Row Name 02/12/21 1001          Pain    Additional Documentation  Pain Scale: FACES Pre/Post-Treatment (Group)  -LM     Row Name 02/12/21 1001          Pain Scale: Numbers Pre/Post-Treatment    Pain Intervention(s)  Repositioned  -LM     Row Name 02/12/21 1001          Pain Scale: FACES Pre/Post-Treatment    Pain: FACES Scale, Pretreatment  2-->hurts little bit  -LM     Posttreatment Pain Rating  4-->hurts little more  -LM     Pain Location - Side  Right  -LM     Pain Location  hip  -LM     Row Name 02/12/21 1001          Plan of Care Review    Plan of Care Reviewed With  patient  -LM     Progress  no change  -LM     Outcome Summary  PT eval completed. Patient is s/p R hip hemiarthroplasty after sustaining a hip fx from a fall. He presents with deficits in ROM, strength, balance, endurance and independence. He is expected to benefit from continued skilled PT intervention to improve his mobility status prior to D/C.  -LM     Row Name 02/12/21 1001          Therapy Assessment/Plan (PT)    Patient/Family Therapy Goals Statement (PT)  None stated.  -LM     Rehab Potential (PT)  good, to achieve stated therapy goals  -LM     Criteria for Skilled Interventions Met (PT)  yes;skilled treatment is necessary  -LM     Row Name 02/12/21 1001          Vital Signs    O2 Delivery Pre Treatment  room air  -LM     O2  Delivery Intra Treatment  room air  -LM     O2 Delivery Post Treatment  room air  -LM     Row Name 02/12/21 1001          Positioning and Restraints    Pre-Treatment Position  in bed  -LM     Post Treatment Position  bed  -LM     In Bed  notified nsg;supine;call light within reach;encouraged to call for assist;exit alarm on;ABD pillow  -LM       User Key  (r) = Recorded By, (t) = Taken By, (c) = Cosigned By    Initials Name Provider Type    Albina Jack, COLBY Physical Therapist        Outcome Measures     Row Name 02/12/21 1001          How much help from another person do you currently need...    Turning from your back to your side while in flat bed without using bedrails?  2  -LM     Moving from lying on back to sitting on the side of a flat bed without bedrails?  1  -LM     Moving to and from a bed to a chair (including a wheelchair)?  1  -LM     Standing up from a chair using your arms (e.g., wheelchair, bedside chair)?  1  -LM     Climbing 3-5 steps with a railing?  1  -LM     To walk in hospital room?  1  -LM     AM-PAC 6 Clicks Score (PT)  7  -LM     Row Name 02/12/21 1001          Functional Assessment    Outcome Measure Options  AM-PAC 6 Clicks Basic Mobility (PT)  -LM       User Key  (r) = Recorded By, (t) = Taken By, (c) = Cosigned By    Initials Name Provider Type    Albina Jack, COLBY Physical Therapist        Physical Therapy Education                 Title: PT OT SLP Therapies (In Progress)     Topic: Physical Therapy (In Progress)     Point: Mobility training (In Progress)     Learning Progress Summary           Patient Acceptance, E,TB, NR,NL by LM at 2/12/2021 1225    Comment: Purpose of PT/POC.                   Point: Home exercise program (In Progress)     Learning Progress Summary           Patient Acceptance, E,TB, NR,NL by LM at 2/12/2021 1225    Comment: Purpose of PT/POC.                   Point: Precautions (In Progress)     Learning Progress Summary           Patient Acceptance, E,TB,  NR,NL by  at 2021 1225    Comment: Purpose of PT/POC.                               User Key     Initials Effective Dates Name Provider Type Discipline     18 -  Albina Issa, PT Physical Therapist PT              PT Recommendation and Plan  Planned Therapy Interventions (PT): balance training, bed mobility training, transfer training, gait training, home exercise program, patient/family education, strengthening  Plan of Care Reviewed With: patient  Progress: no change  Outcome Summary: PT eval completed. Patient is s/p R hip hemiarthroplasty after sustaining a hip fx from a fall. He presents with deficits in ROM, strength, balance, endurance and independence. He is expected to benefit from continued skilled PT intervention to improve his mobility status prior to D/C.     Time Calculation:   PT Charges     Row Name 21 1225             Time Calculation    Start Time  1001  -LM      PT Received On  21  -      PT Goal Re-Cert Due Date  21  -        User Key  (r) = Recorded By, (t) = Taken By, (c) = Cosigned By    Initials Name Provider Type     Albina Issa, PT Physical Therapist        Therapy Charges for Today     Code Description Service Date Service Provider Modifiers Qty    05058077015 HC PT EVAL MOD COMPLEXITY 3 2021 Albina Issa, PT GP 1          PT G-Codes  Outcome Measure Options: AM-PAC 6 Clicks Daily Activity (OT)  AM-PAC 6 Clicks Score (PT): 7  AM-PAC 6 Clicks Score (OT): 8    Albina Issa PT  2021      Electronically signed by Albina Issa, PT at 21 1227          Occupational Therapy Notes (last 24 hours) (Notes from 21 1531 through 21 1531)      Sharon Simpson, OT at 21 1228          Patient Name: Bossman Luna  : 1965    MRN: [id number]                              Today's Date: 2021       Admit Date: 2/10/2021    Visit Dx:     ICD-10-CM ICD-9-CM   1. Closed fracture of right hip, initial encounter  (CMS/HCC)  S72.001A 820.8   2. Closed fracture of hip (CMS/HCC)  S72.009A 820.8     Patient Active Problem List   Diagnosis   • Closed fracture of right hip (CMS/[facility])   • Intellectual disability   • Anxiety associated with depression   • Neurogenic bladder   • Hypothyroidism (acquired)   • Fall     Past Medical History:   Diagnosis Date   • BPH with urinary obstruction    • Cataract     bilateral   • Development delay    • GERD (gastroesophageal reflux disease)    • Glaucoma    • Hypertension    • Impaired functional mobility, balance, gait, and endurance    • Neurogenic bladder    • Osteoporosis    • Prostate disorder    • Rash    • Self-catheterizes urinary bladder    • Thyroid disease    • Urinary retention      Past Surgical History:   Procedure Laterality Date   • CATARACT EXTRACTION W/ INTRAOCULAR LENS IMPLANT Left 3/9/2020    Procedure: CATARACT PHACO EXTRACTION WITH INTRAOCULAR LENS IMPLANT LEFT;  Surgeon: Winifred Peraza MD;  Location: [facility];  Service: Ophthalmology;  Laterality: Left;   • COLONOSCOPY     • ENDOSCOPY     • HIP HEMIARTHROPLASTY Right 2/11/2021    Procedure: HIP HEMIARTHROPLASTY;  Surgeon: Go Sagastume MD;  Location: [facility];  Service: Orthopedics;  Laterality: Right;     General Information     Row Name 02/12/21 1220 02/12/21 1217       OT Time and Intention    Document Type  discharge evaluation/summary  -SD  evaluation  -SD    Mode of Treatment  --  occupational therapy  -SD    Row Name 02/12/21 1217          General Information    Patient Profile Reviewed  yes  -SD     Prior Level of Function  dependent:;ADL's  -SD     Existing Precautions/Restrictions  fall  -SD     Barriers to Rehab  medically complex;previous functional deficit;cognitive status  -SD     Row Name 02/12/21 1217          Living Environment    Lives With  facility resident  -SD     Row Name 02/12/21 1217          Home Main Entrance    Number of Stairs, Main Entrance  none  -SD     Row Name 02/12/21 1217           Stairs Within Home, Primary    Number of Stairs, Within Home, Primary  none  -SD     Row Name 02/12/21 1217          Cognition    Orientation Status (Cognition)  oriented to;person  -SD     Row Name 02/12/21 1217          Safety Issues, Functional Mobility    Safety Issues Affecting Function (Mobility)  safety precautions follow-through/compliance;safety precaution awareness;insight into deficits/self-awareness;awareness of need for assistance;judgment;impulsivity;ability to follow commands;problem-solving;sequencing abilities  -SD     Impairments Affecting Function (Mobility)  balance;cognition;coordination;endurance/activity tolerance;grasp;motor control;motor planning;pain;postural/trunk control;strength  -SD     Cognitive Impairments, Mobility Safety/Performance  attention;awareness, need for assistance;insight into deficits/self-awareness;judgment;impulsivity;safety precaution awareness;safety precaution follow-through;sequencing abilities;problem-solving/reasoning  -SD       User Key  (r) = Recorded By, (t) = Taken By, (c) = Cosigned By    Initials Name Provider Type    SD Sharon Simpson OT Occupational Therapist          Mobility/ADL's     Row Name 02/12/21 1219          Bed Mobility    Bed Mobility  supine-sit;sit-supine  -SD     Supine-Sit [place] (Bed Mobility)  dependent (less than 25% patient effort);2 person assist  -SD     Sit-Supine [place] (Bed Mobility)  dependent (less than 25% patient effort);2 person assist  -SD     Bed Mobility, Safety Issues  decreased use of legs for bridging/pushing;decreased use of arms for pushing/pulling;impaired trunk control for bed mobility  -SD     Assistive Device (Bed Mobility)  bed rails;draw sheet;head of bed elevated  -SD     Comment (Bed Mobility)  EOB 10 mins CGA  -SD     Row Name 02/12/21 1220          Transfers    Comment (Transfers)  NT  -SD     Row Name 02/12/21 1220          Functional Mobility    Functional Mobility- Comment  NT  -SD     Row  Name 02/12/21 1220          Activities of Daily Living    BADL Assessment/Intervention  bathing;upper body dressing;lower body dressing;grooming;feeding;toileting  -[facility] Name 02/12/21 1219          Mobility    Extremity Weight-bearing Status  right lower extremity  -SD     Right Lower Extremity (Weight-bearing Status)  weight-bearing as tolerated (WBAT)  -[facility] Name 02/12/21 1220          Bathing Assessment/Intervention    [place] Level (Bathing)  dependent (less than 25% patient effort)  -SD     Row Name 02/12/21 1220          Upper Body Dressing Assessment/Training    [place] Level (Upper Body Dressing)  dependent (less than 25% patient effort)  -SD     Row Name 02/12/21 1220          Lower Body Dressing Assessment/Training    [place] Level (Lower Body Dressing)  dependent (less than 25% patient effort)  -SD     Row Name 02/12/21 1220          Grooming Assessment/Training    [place] Level (Grooming)  dependent (less than 25% patient effort)  -[facility] Name 02/12/21 1220          Self-Feeding Assessment/Training    [place] Level (Feeding)  supervision;standby assist;contact guard assist  -SD     Row Name 02/12/21 1220          Toileting Assessment/Training    [place] Level (Toileting)  dependent (less than 25% patient effort)  -SD       User Key  (r) = Recorded By, (t) = Taken By, (c) = Cosigned By    Initials Name Provider Type    Sharon Dick OT Occupational Therapist        Obj/Interventions     [facility] Name 02/12/21 1221          Range of Motion Comprehensive    General Range of Motion  bilateral upper extremity ROM WFL  -[facility] Name 02/12/21 1221          Strength Comprehensive (MMT)    Comment, General Manual Muscle Testing (MMT) Assessment  Patient uses arms functionally, not able to follow commands to complete MMT  -SD       User Key  (r) = Recorded By, (t) = Taken By, (c) = Cosigned By    Initials Name Provider Type    Sharon Dick OT Occupational  Therapist        Goals/Plan    No documentation.       Clinical Impression     Row Name 02/12/21 1221          Pain Assessment    Additional Documentation  Pain Scale: FACES Pre/Post-Treatment (Group)  -SD     Row Name 02/12/21 1221          Pain Scale: FACES Pre/Post-Treatment    Pain: FACES Scale, Pretreatment  6-->hurts even more  -SD     Posttreatment Pain Rating  4-->hurts little more  -SD     Pain Location - Side  Right  -SD     Pain Location - Orientation  lower  -SD     Pain Location  extremity  -SD     Row Name 02/12/21 1221          Plan of Care Review    Plan of Care Reviewed With  patient  -SD     Progress  no change  -SD     Outcome Summary  OT eval completed. Patient presents in bed with hip wedge pillow in place, but with knees completely flexed and R hip internally rotated. Patient required TD x 2 assist to reposition in bed and move to EOB, sat EOB 10 mins with CGA. Patient is dependent for all ADL tasks, can self feed with VCs for pacing. No further OT needs at this time as patient is at baseline functional performance. Patient to continued with PT services to address mobility deficits.  -SD     Row Name 02/12/21 1221          Therapy Assessment/Plan (OT)    Therapy Frequency (OT)  evaluation only  -SD     Row Name 02/12/21 1221          Therapy Plan Review/Discharge Plan (OT)    Anticipated Discharge Disposition (OT)  inpatient rehabilitation facility  -SD     Row Name 02/12/21 1221          Positioning and Restraints    Pre-Treatment Position  in bed  -SD     Post Treatment Position  bed  -SD     In Bed  fowlers;call light within reach;encouraged to call for assist;exit alarm on;notified nsg  -SD       User Key  (r) = Recorded By, (t) = Taken By, (c) = Cosigned By    Initials Name Provider Type    Sharon Dick OT Occupational Therapist        Outcome Measures     Row Name 02/12/21 1225          How much help from another is currently needed...    Putting on and taking off regular lower  body clothing?  1  -SD     Bathing (including washing, rinsing, and drying)  1  -SD     Toileting (which includes using toilet bed pan or urinal)  1  -SD     Putting on and taking off regular upper body clothing  1  -SD     Taking care of personal grooming (such as brushing teeth)  1  -SD     Eating meals  3  -SD     AM-PAC 6 Clicks Score (OT)  8  -SD     Row Name 02/12/21 1225          Functional Assessment    Outcome Measure Options  AM-PAC 6 Clicks Daily Activity (OT)  -SD       User Key  (r) = Recorded By, (t) = Taken By, (c) = Cosigned By    Initials Name Provider Type    SD Sharon Simpson OT Occupational Therapist        Occupational Therapy Education                 Title: PT OT SLP Therapies (In Progress)     Topic: Occupational Therapy (Done)     Point: ADL training (Done)     Description:   Instruct learner(s) on proper safety adaptation and remediation techniques during self care or transfers.   Instruct in proper use of assistive devices.              Learning Progress Summary           Patient Acceptance, E,TB, VU by SD at 2/12/2021 1225    Comment: No OT needs.                               User Key     Initials Effective Dates Name Provider Type Discipline    SD 03/07/18 -  Sharon Simpson OT Occupational Therapist OT              OT Recommendation and Plan  Therapy Frequency (OT): evaluation only  Plan of Care Review  Plan of Care Reviewed With: patient  Progress: no change  Outcome Summary: OT eval completed. Patient presents in bed with hip wedge pillow in place, but with knees completely flexed and R hip internally rotated. Patient required TD x 2 assist to reposition in bed and move to EOB, sat EOB 10 mins with CGA. Patient is dependent for all ADL tasks, can self feed with VCs for pacing. No further OT needs at this time as patient is at baseline functional performance. Patient to continued with PT services to address mobility deficits.     Time Calculation:   Time Calculation- OT     Row  Name 02/12/21 1226             Time Calculation- OT    OT Start Time  1000  -SD      OT Received On  02/12/21  -SD      OT Goal Re-Cert Due Date  02/22/21  -SD        User Key  (r) = Recorded By, (t) = Taken By, (c) = Cosigned By    Initials Name Provider Type    Sharon Dick OT Occupational Therapist        Therapy Charges for Today     Code Description Service Date Service Provider Modifiers Qty    21655956980 HC OT EVAL MOD COMPLEXITY 3 2/12/2021 Sharon Simpson OT GO 1               Sharon Simpson OT  2/12/2021    Electronically signed by Sharon Simpson OT at 02/12/21 1229     Sharon Simpson OT at 02/12/21 1225        Goal Outcome Evaluation:  Plan of Care Reviewed With: patient  Progress: no change  Outcome Summary: OT eval completed. Patient presents in bed with hip wedge pillow in place, but with knees completely flexed and R hip internally rotated. Patient required TD x 2 assist to reposition in bed and move to EOB, sat EOB 10 mins with CGA. Patient is dependent for all ADL tasks, can self feed with VCs for pacing. No further OT needs at this time as patient is at baseline functional performance. Patient to continued with PT services to address mobility deficits.    Electronically signed by Sharon Simpson OT at 02/12/21 1225       Speech Language Pathology Notes (last 24 hours) (Notes from 02/11/21 1531 through 02/12/21 1531)    No notes exist for this encounter.         Respiratory Therapy Notes (last 24 hours) (Notes from 02/11/21 1531 through 02/12/21 1531)    No notes exist for this encounter.         Discharge Summary    No notes of this type exist for this encounter.

## 2021-02-13 LAB
ANION GAP SERPL CALCULATED.3IONS-SCNC: 6.5 MMOL/L (ref 5–15)
BASOPHILS # BLD AUTO: 0.01 10*3/MM3 (ref 0–0.2)
BASOPHILS NFR BLD AUTO: 0.2 % (ref 0–1.5)
BUN SERPL-MCNC: 5 MG/DL (ref 6–20)
BUN/CREAT SERPL: 8.2 (ref 7–25)
CALCIUM SPEC-SCNC: 8.2 MG/DL (ref 8.6–10.5)
CHLORIDE SERPL-SCNC: 97 MMOL/L (ref 98–107)
CO2 SERPL-SCNC: 31.5 MMOL/L (ref 22–29)
CREAT SERPL-MCNC: 0.61 MG/DL (ref 0.76–1.27)
DEPRECATED RDW RBC AUTO: 48.5 FL (ref 37–54)
EOSINOPHIL # BLD AUTO: 0.12 10*3/MM3 (ref 0–0.4)
EOSINOPHIL NFR BLD AUTO: 2.3 % (ref 0.3–6.2)
ERYTHROCYTE [DISTWIDTH] IN BLOOD BY AUTOMATED COUNT: 13.2 % (ref 12.3–15.4)
GFR SERPL CREATININE-BSD FRML MDRD: 138 ML/MIN/1.73
GLUCOSE SERPL-MCNC: 120 MG/DL (ref 65–99)
HCT VFR BLD AUTO: 31 % (ref 37.5–51)
HGB BLD-MCNC: 10.5 G/DL (ref 13–17.7)
IMM GRANULOCYTES # BLD AUTO: 0.03 10*3/MM3 (ref 0–0.05)
IMM GRANULOCYTES NFR BLD AUTO: 0.6 % (ref 0–0.5)
LYMPHOCYTES # BLD AUTO: 0.51 10*3/MM3 (ref 0.7–3.1)
LYMPHOCYTES NFR BLD AUTO: 9.8 % (ref 19.6–45.3)
MCH RBC QN AUTO: 33.8 PG (ref 26.6–33)
MCHC RBC AUTO-ENTMCNC: 33.9 G/DL (ref 31.5–35.7)
MCV RBC AUTO: 99.7 FL (ref 79–97)
MONOCYTES # BLD AUTO: 0.55 10*3/MM3 (ref 0.1–0.9)
MONOCYTES NFR BLD AUTO: 10.6 % (ref 5–12)
NEUTROPHILS NFR BLD AUTO: 3.98 10*3/MM3 (ref 1.7–7)
NEUTROPHILS NFR BLD AUTO: 76.5 % (ref 42.7–76)
NRBC BLD AUTO-RTO: 0 /100 WBC (ref 0–0.2)
PLATELET # BLD AUTO: 138 10*3/MM3 (ref 140–450)
PMV BLD AUTO: 10.1 FL (ref 6–12)
POTASSIUM SERPL-SCNC: 3.5 MMOL/L (ref 3.5–5.2)
RBC # BLD AUTO: 3.11 10*6/MM3 (ref 4.14–5.8)
SODIUM SERPL-SCNC: 135 MMOL/L (ref 136–145)
WBC # BLD AUTO: 5.2 10*3/MM3 (ref 3.4–10.8)

## 2021-02-13 PROCEDURE — 85025 COMPLETE CBC W/AUTO DIFF WBC: CPT | Performed by: INTERNAL MEDICINE

## 2021-02-13 PROCEDURE — 25010000002 ENOXAPARIN PER 10 MG: Performed by: ORTHOPAEDIC SURGERY

## 2021-02-13 PROCEDURE — 80048 BASIC METABOLIC PNL TOTAL CA: CPT | Performed by: INTERNAL MEDICINE

## 2021-02-13 PROCEDURE — 97110 THERAPEUTIC EXERCISES: CPT

## 2021-02-13 PROCEDURE — 99232 SBSQ HOSP IP/OBS MODERATE 35: CPT | Performed by: INTERNAL MEDICINE

## 2021-02-13 RX ORDER — POLYETHYLENE GLYCOL 3350 17 G/17G
17 POWDER, FOR SOLUTION ORAL DAILY
Status: DISCONTINUED | OUTPATIENT
Start: 2021-02-13 | End: 2021-02-17 | Stop reason: HOSPADM

## 2021-02-13 RX ADMIN — ENOXAPARIN SODIUM 40 MG: 40 INJECTION SUBCUTANEOUS at 08:37

## 2021-02-13 RX ADMIN — SODIUM CHLORIDE, PRESERVATIVE FREE 10 ML: 5 INJECTION INTRAVENOUS at 21:46

## 2021-02-13 RX ADMIN — RISPERIDONE 2 MG: 1 TABLET ORAL at 08:38

## 2021-02-13 RX ADMIN — FLUOXETINE 20 MG: 20 CAPSULE ORAL at 08:38

## 2021-02-13 RX ADMIN — DOCUSATE SODIUM 100 MG: 100 CAPSULE ORAL at 08:38

## 2021-02-13 RX ADMIN — RISPERIDONE 4 MG: 1 TABLET ORAL at 21:46

## 2021-02-13 RX ADMIN — TAMSULOSIN HYDROCHLORIDE 0.4 MG: 0.4 CAPSULE ORAL at 21:45

## 2021-02-13 RX ADMIN — CLONAZEPAM 0.5 MG: 0.5 TABLET ORAL at 06:29

## 2021-02-13 RX ADMIN — CLONAZEPAM 0.5 MG: 0.5 TABLET ORAL at 21:45

## 2021-02-13 RX ADMIN — CLONAZEPAM 0.5 MG: 0.5 TABLET ORAL at 13:53

## 2021-02-13 RX ADMIN — SODIUM CHLORIDE, PRESERVATIVE FREE 10 ML: 5 INJECTION INTRAVENOUS at 08:33

## 2021-02-13 RX ADMIN — METOPROLOL SUCCINATE 25 MG: 25 TABLET, EXTENDED RELEASE ORAL at 08:38

## 2021-02-13 RX ADMIN — POLYETHYLENE GLYCOL 3350 17 G: 17 POWDER, FOR SOLUTION ORAL at 13:53

## 2021-02-13 RX ADMIN — LEVOTHYROXINE SODIUM 88 MCG: 88 TABLET ORAL at 08:38

## 2021-02-13 RX ADMIN — PANTOPRAZOLE SODIUM 40 MG: 40 TABLET, DELAYED RELEASE ORAL at 21:45

## 2021-02-13 RX ADMIN — FLUTICASONE PROPIONATE 2 SPRAY: 50 SPRAY, METERED NASAL at 08:38

## 2021-02-13 RX ADMIN — BUDESONIDE 3 MG: 3 CAPSULE, GELATIN COATED ORAL at 08:38

## 2021-02-13 RX ADMIN — Medication 10 MG: at 21:45

## 2021-02-13 NOTE — PROGRESS NOTES
[facility]    Name:  Bossman Luna   Age:  55 y.o.  Sex:  male  :  1965  MRN:  [id number]   Visit Number:  [id number]  Admission Date:  2/10/2021  Date Of Service:  21  Primary Care Physician:  Hanna Quinones APRN     LOS: 3 days :  Patient Care Team:  Hanna Quinones APRN as PCP - General (Family Medicine):    Chief Complaint:      Hip fracture    Subjective / Interval History:     Patient seen and examined resting comfortably in bed. No family present at bedside. Patient very pleasant. No complaints this morning. No acute events overnight.     Review of Systems:     Unable to obtain due to clinical presentation    Vital Signs:    Temp:  [97.2 °F (36.2 °C)-98.5 °F (36.9 °C)] 98 °F (36.7 °C)  Heart Rate:  [] 93  Resp:  [16-20] 16  BP: (104-130)/(47-67) 126/63    Intake and output:    I/O last 3 completed shifts:  In: 1080 [P.O.:1080]  Out: 6250 [Urine:6250]  I/O this shift:  In: 480 [P.O.:480]  Out: 825 [Urine:825]    Physical Examination:  Examined again     General Appearance:  Alert and cooperative, not in any acute distress.   Head:  Atraumatic and normocephalic, without obvious abnormality.   Eyes:          PERRLA, conjunctivae and sclerae normal, No pallor. Extraocular movements are within normal limits.   Neck: Supple, trachea midline, no thyromegaly, no carotid bruit.   Lungs:   Chest shape is normal. Breath sounds heard bilaterally equally.  No crackles or wheezing.    Heart:  Normal S1 and S2, no murmur,  No JVD   Abdomen:   Normal bowel sounds,  Soft, nontender, nondistended, no guarding, no rebound tenderness.   Extremities: no edema, no cyanosis, no clubbing.   Skin: No bleeding, bruising or rash.   Neurologic: Awake, alert.      Laboratory results:    Results from last 7 days   Lab Units 21  0654 21  0625 02/10/21  2237   SODIUM mmol/L 135* 135* 131*   POTASSIUM mmol/L 3.5 3.7 3.4*   CHLORIDE mmol/L 97* 98  94*   CO2 mmol/L 31.5* 29.9* 29.7*   BUN mg/dL 5* 6 15   CREATININE mg/dL 0.61* 0.70* 0.78   CALCIUM mg/dL 8.2* 8.2* 8.3*   BILIRUBIN mg/dL  --   --  0.3   ALK PHOS U/L  --   --  59   ALT (SGPT) U/L  --   --  16   AST (SGOT) U/L  --   --  20   GLUCOSE mg/dL 120* 112* 105*     Results from last 7 days   Lab Units 02/13/21  0654 02/12/21  0625 02/10/21  2237   WBC 10*3/mm3 5.20 4.41 7.23   HEMOGLOBIN g/dL 10.5* 10.8* 12.6*   HEMATOCRIT % 31.0* 32.2* 37.2*   PLATELETS 10*3/mm3 138* 133* 156     Results from last 7 days   Lab Units 02/10/21  2237   INR  1.10                   I have reviewed the patient's laboratory results.    Radiology results:    Imaging Results (Last 24 Hours)     Procedure Component Value Units Date/Time    XR Hip With or Without Pelvis 2 - 3 View Right [[id number]] Collected: 02/13/21 0749     Updated: 02/13/21 0753    Narrative:      FRONTAL PELVIS  AND RIGHT HIP RADIOGRAPHS     HISTORY: Right hip pain.     COMPARISON: None.     FINDINGS:  A two view exam demonstrates right hip hemiarthroplasty. No  fracture. The pelvis is intact. Expected soft tissue gas and edema of  the proximal right thigh. Murphy catheter noted.           Impression:      No complication identified.     This report was finalized on 2/13/2021 7:51 AM by Dr Francisco Pardo DO.          I have reviewed the patient's radiology reports.    Medication Review:     I have reviewed the patient's active and prn medications.       Closed fracture of right hip (CMS/HCC)    Intellectual disability    Anxiety associated with depression    Neurogenic bladder    Hypothyroidism (acquired)    Fall      Assessment:      Closed fracture of right hip    Intellectual disability    Anxiety associated with depression    Neurogenic bladder    Hypothyroidism (acquired)    Fall    Plan:    Continue to monitor in the hospital. Patient underwent surgical intervention by Dr. Sagastume on 2/11, no post-op complications. Did have expected post-op blood loss, but does  not meet criteria for PRBC transfusion. Continue PT/OT, supportive care.  Appreciate case management's assistance with discharge planning.  Patient will need outpatient rehab.  The patient requires positioning of the body in ways not feasible with an ordinary bed in order to alleviate pain.  Hospital bed is recommended.  Further orders as clinical course dictates.    Plan for discharge on Monday once hospital bed can be delivered.       Juan J Vital DO  02/13/21  12:53 EST    Dictated utilizing Dragon dictation.

## 2021-02-13 NOTE — PROGRESS NOTES
Discharge Planning Assessment  [facility]     Patient Name: Bossman Luna  MRN: [id number]  Today's Date: 2/13/2021    Admit Date: 2/10/2021    Discharge Needs Assessment    No documentation.       Discharge Plan     Row Name 02/13/21 0919       Plan    Plan  Orders for hospital bed at discharge noted  Pt chose [facility] as DME  Spoke with [facility] about order for bed   Confirmed new Fax number and faxed face sheet, orders and progress notes with success    Pt to have [facility] which has already been arranged by Abby Schuler CM          Continued Care and Services - Admitted Since 2/10/2021     Home Medical Care     Service Provider Request Status Selected Services Address Phone Fax Patient Preferred    [facility] [place]  Pending - Request Sent N/A [address] [address] [phone] --                Demographic Summary    No documentation.       Functional Status    No documentation.       Psychosocial    No documentation.       Abuse/Neglect    No documentation.       Legal    No documentation.       Substance Abuse    No documentation.       Patient Forms    No documentation.           Carmelita Baer RN

## 2021-02-13 NOTE — PLAN OF CARE
Goal Outcome Evaluation:  Plan of Care Reviewed With: patient  Progress: no change  Outcome Summary: VSS on RA. Denies any pain. Pt constantly curls up in bed and bends his knees despite use of the abductor pillow and redirection.

## 2021-02-13 NOTE — PROGRESS NOTES
Progress Report    Xrays of pelvis reviewed and the right hip hemiarthroplasty implant is stable with no dislocation noted.     PT can resume with WBAT maintaining hip precautions.    Recommend abduction pillow when seated and supine.    Will follow.    Dictated by Eros Puente PA-C.

## 2021-02-13 NOTE — THERAPY TREATMENT NOTE
Patient Name: Bossman Luna  : 1965    MRN: [id number]                              Today's Date: 2021       Admit Date: 2/10/2021    Visit Dx:     ICD-10-CM ICD-9-CM   1. Closed fracture of right hip, initial encounter (CMS/[facility])  S72.001A 820.8   2. Closed fracture of hip (CMS/[facility])  S72.009A 820.8     Patient Active Problem List   Diagnosis   • Closed fracture of right hip (CMS/[facility])   • Intellectual disability   • Anxiety associated with depression   • Neurogenic bladder   • Hypothyroidism (acquired)   • Fall     Past Medical History:   Diagnosis Date   • BPH with urinary obstruction    • Cataract     bilateral   • Development delay    • GERD (gastroesophageal reflux disease)    • Glaucoma    • Hypertension    • Impaired functional mobility, balance, gait, and endurance    • Neurogenic bladder    • Osteoporosis    • Prostate disorder    • Rash    • Self-catheterizes urinary bladder    • Thyroid disease    • Urinary retention      Past Surgical History:   Procedure Laterality Date   • CATARACT EXTRACTION W/ INTRAOCULAR LENS IMPLANT Left 3/9/2020    Procedure: CATARACT PHACO EXTRACTION WITH INTRAOCULAR LENS IMPLANT LEFT;  Surgeon: Winifred Peraza MD;  Location: [facility];  Service: Ophthalmology;  Laterality: Left;   • COLONOSCOPY     • ENDOSCOPY     • HIP HEMIARTHROPLASTY Right 2021    Procedure: HIP HEMIARTHROPLASTY;  Surgeon: Go Sagastume MD;  Location: [facility];  Service: Orthopedics;  Laterality: Right;     General Information     Row Name 21 1103          Physical Therapy Time and Intention    Document Type  therapy note (daily note)  -MR     Mode of Treatment  physical therapy  -MR     Row Name 21 1103          Cognition    Orientation Status (Cognition)  oriented to;person  -MR     Row Name 21 1103          Safety Issues, Functional Mobility    Safety Issues Affecting Function (Mobility)  ability to follow commands;insight into  deficits/self-awareness;judgment;safety precautions follow-through/compliance  -MR     Impairments Affecting Function (Mobility)  coordination;cognition;strength;endurance/activity tolerance  -MR     Cognitive Impairments, Mobility Safety/Performance  safety precaution awareness;insight into deficits/self-awareness  -MR       User Key  (r) = Recorded By, (t) = Taken By, (c) = Cosigned By    Initials Name Provider Type    Nicole Spencer, ROSE Physical Therapy Assistant        Mobility     Row Name 02/13/21 1103          Bed Mobility    Bed Mobility  supine-sit-supine;scooting/bridging  -MR     Scooting/Bridging [place] (Bed Mobility)  dependent (less than 25% patient effort)  -MR     Supine-Sit [place] (Bed Mobility)  dependent (less than 25% patient effort);verbal cues  -MR     Sit-Supine [place] (Bed Mobility)  dependent (less than 25% patient effort);verbal cues  -MR     Assistive Device (Bed Mobility)  draw sheet;head of bed elevated  -MR     Comment (Bed Mobility)  patient sat EOB for approx 30sec then asked to lay back down ; able to sit EOB with close supervision / CGA  -MR     Row Name 02/13/21 1103          Gait/Stairs (Locomotion)    Comment (Gait/Stairs)  non amb  -MR     Row Name 02/13/21 1103          Mobility    Extremity Weight-bearing Status  right lower extremity  -MR     Right Lower Extremity (Weight-bearing Status)  weight-bearing as tolerated (WBAT)  -MR       User Key  (r) = Recorded By, (t) = Taken By, (c) = Cosigned By    Initials Name Provider Type    Nicole Spencer, ROSE Physical Therapy Assistant        Obj/Interventions     Row Name 02/13/21 1103          Range of Motion Comprehensive    Comment, General Range of Motion  patient has LE contractures and stays in a curled up position in bed ; attempted gentle streches but patient would yell out  -MR     Row Name 02/13/21 1103          Motor Skills    Therapeutic Exercise  knee;ankle  -MR     Row Name 02/13/21 1103           Knee (Therapeutic Exercise)    Knee (Therapeutic Exercise)  PROM (passive range of motion)  -MR     Row Name 02/13/21 1103          Ankle (Therapeutic Exercise)    Ankle (Therapeutic Exercise)  AROM (active range of motion)  -MR     Ankle AROM (Therapeutic Exercise)  bilateral;plantarflexion;dorsiflexion;10 repetitions  -MR     Row Name 02/13/21 1103          Balance    Balance Assessment  sitting static balance  -MR     Static Sitting Balance  mild impairment;unsupported;sitting, edge of bed  -MR     Comment, Balance  patient fatigues quickly requesting to lay back down  -MR       User Key  (r) = Recorded By, (t) = Taken By, (c) = Cosigned By    Initials Name Provider Type    Nicole Spencer, ROSE Physical Therapy Assistant        Goals/Plan    No documentation.       Clinical Impression     Row Name 02/13/21 1103          Pain Scale: FACES Pre/Post-Treatment    Pre/Posttreatment Pain Comment  patient was unable to rate pain or even respond when asked if he was in pain but would yell when repositioned and gentle ROM  -MR     Row Name 02/13/21 1103          Plan of Care Review    Plan of Care Reviewed With  patient  -MR     Progress  no change  -MR     Outcome Summary  Patient has LE contractures and is unable to obtain proper position for ABD pillow however is still in place.  Patient was repositoned in bed with total assist and LE ROM her flow sheet.  -MR     Row Name 02/13/21 1103          Vital Signs    O2 Delivery Pre Treatment  room air  -MR     O2 Delivery Intra Treatment  room air  -MR     O2 Delivery Post Treatment  room air  -MR     Row Name 02/13/21 1103          Positioning and Restraints    Pre-Treatment Position  in bed patient had pulled his IV out ; RN notified  -MR     Post Treatment Position  bed  -MR     In Bed  exit alarm on;supine;encouraged to call for assist;call light within reach;ABD pillow;side rails up x2  -MR       User Key  (r) = Recorded By, (t) = Taken By, (c) = Cosigned By     Initials Name Provider Type    Nicole Spencer, ROSE Physical Therapy Assistant        Outcome Measures     Row Name 02/13/21 1103          How much help from another person do you currently need...    Turning from your back to your side while in flat bed without using bedrails?  1  -MR     Moving from lying on back to sitting on the side of a flat bed without bedrails?  1  -MR     Moving to and from a bed to a chair (including a wheelchair)?  1  -MR     Standing up from a chair using your arms (e.g., wheelchair, bedside chair)?  1  -MR     Climbing 3-5 steps with a railing?  1  -MR     To walk in hospital room?  1  -MR     AM-PAC 6 Clicks Score (PT)  6  -MR       User Key  (r) = Recorded By, (t) = Taken By, (c) = Cosigned By    Initials Name Provider Type    Nicole Spencer, ROSE Physical Therapy Assistant        Physical Therapy Education                 Title: PT OT SLP Therapies (In Progress)     Topic: Physical Therapy (In Progress)     Point: Mobility training (In Progress)     Learning Progress Summary           Patient Acceptance, E, NR,NL by  at 2/13/2021 1103    Acceptance, E, VU,NL by  at 2/13/2021 0240    Acceptance, E,TB,D, VU,NR,NL by  at 2/12/2021 1617    Comment: THP and sequencing for mobility    Acceptance, E,TB, NR,NL by  at 2/12/2021 1225    Comment: Purpose of PT/POC.                   Point: Home exercise program (In Progress)     Learning Progress Summary           Patient Acceptance, E,TB, NR,NL by  at 2/12/2021 1225    Comment: Purpose of PT/POC.                   Point: Precautions (In Progress)     Learning Progress Summary           Patient Acceptance, E,TB, NR,NL by  at 2/12/2021 1225    Comment: Purpose of PT/POC.                               User Key     Initials Effective Dates Name Provider Type Discipline     04/03/18 -  Albina Issa, PT Physical Therapist PT     03/07/18 -  Luca Covington PTA Physical Therapy Assistant PT     10/15/19 -  Derek  ROSE Rivera Physical Therapy Assistant PT    CG 12/14/20 -  Brian Vega RN Registered Nurse Nurse              PT Recommendation and Plan     Plan of Care Reviewed With: patient  Progress: no change  Outcome Summary: Patient has LE contractures and is unable to obtain proper position for ABD pillow however is still in place.  Patient was repositoned in bed with total assist and LE ROM her flow sheet.     Time Calculation:   PT Charges     Row Name 02/13/21 1103             Time Calculation    Start Time  1103  -MR      Stop Time  1115  -MR      Time Calculation (min)  12 min  -MR      PT Received On  02/13/21  -MR         Timed Charges    50660 - PT Therapeutic Exercise Minutes  12  -MR        User Key  (r) = Recorded By, (t) = Taken By, (c) = Cosigned By    Initials Name Provider Type     Derek ROSE Rivera Physical Therapy Assistant        Therapy Charges for Today     Code Description Service Date Service Provider Modifiers Qty    43473375771 HC PT THER PROC EA 15 MIN 2/13/2021 Nicole Reed PTA GP 1          PT G-Codes  Outcome Measure Options: AM-PAC 6 Clicks Basic Mobility (PT)  AM-PAC 6 Clicks Score (PT): 6  AM-PAC 6 Clicks Score (OT): 8    Nicole Reed PTA  2/13/2021

## 2021-02-13 NOTE — PLAN OF CARE
Goal Outcome Evaluation:  Plan of Care Reviewed With: patient  Progress: no change  Outcome Summary: Patient has LE contractures and is unable to obtain proper position for ABD pillow however is still in place.  Patient was repositoned in bed with total assist and LE ROM her flow sheet.

## 2021-02-14 PROCEDURE — 97530 THERAPEUTIC ACTIVITIES: CPT

## 2021-02-14 PROCEDURE — 97110 THERAPEUTIC EXERCISES: CPT

## 2021-02-14 PROCEDURE — 25010000002 ENOXAPARIN PER 10 MG: Performed by: ORTHOPAEDIC SURGERY

## 2021-02-14 PROCEDURE — 99232 SBSQ HOSP IP/OBS MODERATE 35: CPT | Performed by: INTERNAL MEDICINE

## 2021-02-14 RX ADMIN — FLUOXETINE 20 MG: 20 CAPSULE ORAL at 08:37

## 2021-02-14 RX ADMIN — METOPROLOL SUCCINATE 25 MG: 25 TABLET, EXTENDED RELEASE ORAL at 08:37

## 2021-02-14 RX ADMIN — DOCUSATE SODIUM 100 MG: 100 CAPSULE ORAL at 08:36

## 2021-02-14 RX ADMIN — ENOXAPARIN SODIUM 40 MG: 40 INJECTION SUBCUTANEOUS at 08:35

## 2021-02-14 RX ADMIN — TAMSULOSIN HYDROCHLORIDE 0.4 MG: 0.4 CAPSULE ORAL at 22:06

## 2021-02-14 RX ADMIN — RISPERIDONE 4 MG: 1 TABLET ORAL at 22:05

## 2021-02-14 RX ADMIN — PANTOPRAZOLE SODIUM 40 MG: 40 TABLET, DELAYED RELEASE ORAL at 22:06

## 2021-02-14 RX ADMIN — SODIUM CHLORIDE, PRESERVATIVE FREE 10 ML: 5 INJECTION INTRAVENOUS at 22:06

## 2021-02-14 RX ADMIN — CLONAZEPAM 0.5 MG: 0.5 TABLET ORAL at 22:06

## 2021-02-14 RX ADMIN — CLONAZEPAM 0.5 MG: 0.5 TABLET ORAL at 06:00

## 2021-02-14 RX ADMIN — LEVOTHYROXINE SODIUM 88 MCG: 88 TABLET ORAL at 08:36

## 2021-02-14 RX ADMIN — POLYETHYLENE GLYCOL 3350 17 G: 17 POWDER, FOR SOLUTION ORAL at 08:35

## 2021-02-14 RX ADMIN — BUDESONIDE 3 MG: 3 CAPSULE, GELATIN COATED ORAL at 08:37

## 2021-02-14 RX ADMIN — FLUTICASONE PROPIONATE 2 SPRAY: 50 SPRAY, METERED NASAL at 08:38

## 2021-02-14 RX ADMIN — Medication 10 MG: at 22:04

## 2021-02-14 RX ADMIN — SODIUM CHLORIDE 500 ML: 9 INJECTION, SOLUTION INTRAVENOUS at 08:35

## 2021-02-14 RX ADMIN — RISPERIDONE 2 MG: 1 TABLET ORAL at 08:36

## 2021-02-14 RX ADMIN — SODIUM CHLORIDE, PRESERVATIVE FREE 10 ML: 5 INJECTION INTRAVENOUS at 08:28

## 2021-02-14 RX ADMIN — CLONAZEPAM 0.5 MG: 0.5 TABLET ORAL at 14:47

## 2021-02-14 NOTE — PLAN OF CARE
Goal Outcome Evaluation:  Plan of Care Reviewed With: patient  Progress: no change  Outcome Summary: No acute event overnight. VSS on RA. Abductor pillow adjusted throughout the shift.

## 2021-02-14 NOTE — THERAPY TREATMENT NOTE
Patient Name: Bossman Luna  : 1965    MRN: [id number]                              Today's Date: 2021       Admit Date: 2/10/2021    Visit Dx:     ICD-10-CM ICD-9-CM   1. Closed fracture of right hip, initial encounter (CMS/[facility])  S72.001A 820.8   2. Closed fracture of hip (CMS/[facility])  S72.009A 820.8     Patient Active Problem List   Diagnosis   • Closed fracture of right hip (CMS/[facility])   • Intellectual disability   • Anxiety associated with depression   • Neurogenic bladder   • Hypothyroidism (acquired)   • Fall     Past Medical History:   Diagnosis Date   • BPH with urinary obstruction    • Cataract     bilateral   • Development delay    • GERD (gastroesophageal reflux disease)    • Glaucoma    • Hypertension    • Impaired functional mobility, balance, gait, and endurance    • Neurogenic bladder    • Osteoporosis    • Prostate disorder    • Rash    • Self-catheterizes urinary bladder    • Thyroid disease    • Urinary retention      Past Surgical History:   Procedure Laterality Date   • CATARACT EXTRACTION W/ INTRAOCULAR LENS IMPLANT Left 3/9/2020    Procedure: CATARACT PHACO EXTRACTION WITH INTRAOCULAR LENS IMPLANT LEFT;  Surgeon: Winifred Peraza MD;  Location: [facility];  Service: Ophthalmology;  Laterality: Left;   • COLONOSCOPY     • ENDOSCOPY     • HIP HEMIARTHROPLASTY Right 2021    Procedure: HIP HEMIARTHROPLASTY;  Surgeon: Go Sagastume MD;  Location: [facility];  Service: Orthopedics;  Laterality: Right;     General Information     Row Name 21 0951          Physical Therapy Time and Intention    Document Type  therapy note (daily note)  -CC     Mode of Treatment  physical therapy  -CC     Row Name 21 0951          General Information    Patient Profile Reviewed  yes  -CC     Existing Precautions/Restrictions  hip, posterior  -CC     Row Name 21 0951          Safety Issues, Functional Mobility    Safety Issues Affecting Function (Mobility)  ability to follow  commands;insight into deficits/self-awareness;judgment;safety precautions follow-through/compliance  -     Impairments Affecting Function (Mobility)  coordination;cognition;strength;endurance/activity tolerance  -       User Key  (r) = Recorded By, (t) = Taken By, (c) = Cosigned By    Initials Name Provider Type     Ximena Ferrera PTA Physical Therapy Assistant        Mobility     Row Name 02/14/21 0951          Bed Mobility    Bed Mobility  rolling right;rolling left  -     Rolling Left [place] (Bed Mobility)  maximum assist (25% patient effort);dependent (less than 25% patient effort);2 person assist  -     Rolling Right [place] (Bed Mobility)  dependent (less than 25% patient effort)  -     Scooting/Bridging [place] (Bed Mobility)  dependent (less than 25% patient effort)  -CC     Supine-Sit [place] (Bed Mobility)  dependent (less than 25% patient effort);verbal cues  -     Sit-Supine [place] (Bed Mobility)  dependent (less than 25% patient effort);verbal cues  -     Assistive Device (Bed Mobility)  draw sheet;head of bed elevated  -     Comment (Bed Mobility)  repositioning in bed for optimal alignment of right hip  -     Row Name 02/14/21 0951          Gait/Stairs (Locomotion)    [place] Level (Gait)  unable to assess  -     Row Name 02/14/21 0951          Mobility    Extremity Weight-bearing Status  right lower extremity  -     Right Lower Extremity (Weight-bearing Status)  weight-bearing as tolerated (WBAT)  -       User Key  (r) = Recorded By, (t) = Taken By, (c) = Cosigned By    Initials Name Provider Type     Ximena Ferrera PTA Physical Therapy Assistant        Obj/Interventions     Row Name 02/14/21 0951          Range of Motion Comprehensive    General Range of Motion  lower extremity range of motion deficits identified  -     Comment, General Range of Motion  Pt with R knee with decreased full ext to 0 degrees. Performed PROM/JAYLEN LAMAR LE  in supine in all planes following hip precautions without c/o significant pain  -CC       User Key  (r) = Recorded By, (t) = Taken By, (c) = Cosigned By    Initials Name Provider Type    Ximena Salmon PTA Physical Therapy Assistant        Goals/Plan    No documentation.       Clinical Impression     Row Name 02/14/21 0951          Pain    Additional Documentation  Pain Scale: FACES Pre/Post-Treatment (Group)  -CC     Row Name 02/14/21 0951          Pain Scale: FACES Pre/Post-Treatment    Pain: FACES Scale, Pretreatment  2-->hurts little bit  -CC     Posttreatment Pain Rating  4-->hurts little more  -CC     Pain Location - Side  Right  -CC     Pain Location  extremity  -CC     Row Name 02/14/21 0951          Plan of Care Review    Plan of Care Reviewed With  patient  -CC     Progress  no change  -     Outcome Summary  Pt in supine with B knees flexed with L >R. Pt with R knee with decreased full ext L knee in full ext. Performed PROM/AAROM B LE in supine in all planes following hip precautions without c/o significant increase in pain 3-4/1. Repositioned pt after rolling side to side, and placed abd wedge with good optimal alignment R hip.  Con't with PT POC and progress as tolerated  -     Row Name 02/14/21 0951          Positioning and Restraints    Pre-Treatment Position  in bed  -CC     Post Treatment Position  bed  -CC     In Bed  supine;call light within reach;encouraged to call for assist;ABD pillow  -CC       User Key  (r) = Recorded By, (t) = Taken By, (c) = Cosigned By    Initials Name Provider Type    Ximena Salmon PTA Physical Therapy Assistant        Outcome Measures     Row Name 02/14/21 0951          How much help from another person do you currently need...    Turning from your back to your side while in flat bed without using bedrails?  2  -CC     Moving from lying on back to sitting on the side of a flat bed without bedrails?  1  -CC     Moving to and from a bed to a chair  (including a wheelchair)?  1  -CC     Standing up from a chair using your arms (e.g., wheelchair, bedside chair)?  1  -CC     Climbing 3-5 steps with a railing?  1  -CC     To walk in hospital room?  1  -CC     AM-PAC 6 Clicks Score (PT)  7  -CC     Row Name 02/14/21 0951          Functional Assessment    Outcome Measure Options  AM-PAC 6 Clicks Basic Mobility (PT)  -CC       User Key  (r) = Recorded By, (t) = Taken By, (c) = Cosigned By    Initials Name Provider Type    CC Ximena Ferrera, ROSE Physical Therapy Assistant        Physical Therapy Education                 Title: PT OT SLP Therapies (In Progress)     Topic: Physical Therapy (In Progress)     Point: Mobility training (In Progress)     Learning Progress Summary           Patient Acceptance, E, NR,NL by  at 2/13/2021 1103    Acceptance, E, VU,NL by  at 2/13/2021 0240    Acceptance, E,TB,D, VU,NR,NL by  at 2/12/2021 1617    Comment: THP and sequencing for mobility    Acceptance, E,TB, NR,NL by  at 2/12/2021 1225    Comment: Purpose of PT/POC.                   Point: Home exercise program (In Progress)     Learning Progress Summary           Patient Acceptance, E,TB, NR,NL by  at 2/12/2021 1225    Comment: Purpose of PT/POC.                   Point: Precautions (Done)     Learning Progress Summary           Patient Acceptance, E,TB, VU by  at 2/14/2021 1052    Comment: educated pt on importance R LE positioning    Acceptance, E,TB, NR,NL by  at 2/12/2021 1225    Comment: Purpose of PT/POC.                               User Key     Initials Effective Dates Name Provider Type Discipline     04/03/18 -  Albina Issa, PT Physical Therapist PT    CC 03/07/18 -  Ximena Ferrera, ROSE Physical Therapy Assistant PT    RM 03/07/18 -  Luca Covington, PTA Physical Therapy Assistant PT    MR 10/15/19 -  Nicole Reed PTA Physical Therapy Assistant PT    CG 12/14/20 -  Brian Vega, RN Registered Nurse Nurse              PT  Recommendation and Plan     Plan of Care Reviewed With: patient  Progress: no change  Outcome Summary: Pt in supine with B knees flexed with L >R. Pt with R knee with decreased full ext L knee in full ext. Performed PROM/AAROM B LE in supine in all planes following hip precautions without c/o significant increase in pain 3-4/1. Repositioned pt after rolling side to side, and placed abd wedge with good optimal alignment R hip.  Con't with PT POC and progress as tolerated     Time Calculation:   PT Charges     Row Name 02/14/21 0951             Time Calculation    Start Time  0951  -CC      Stop Time  1029  -CC      Time Calculation (min)  38 min  -CC      PT Received On  02/14/21  -CC      PT Goal Re-Cert Due Date  02/22/21  -CC         Timed Charges    80248 - PT Therapeutic Exercise Minutes  23  -CC      51489 - PT Therapeutic Activity Minutes  15  -CC        User Key  (r) = Recorded By, (t) = Taken By, (c) = Cosigned By    Initials Name Provider Type    CC Ximena Ferrera PTA Physical Therapy Assistant        Therapy Charges for Today     Code Description Service Date Service Provider Modifiers Qty    70279603033 HC PT THER PROC EA 15 MIN 2/14/2021 Ximena Ferrera PTA GP 2    34593845571 HC PT THERAPEUTIC ACT EA 15 MIN 2/14/2021 Ximena Ferrera PTA GP 1          PT G-Codes  Outcome Measure Options: AM-PAC 6 Clicks Basic Mobility (PT)  AM-PAC 6 Clicks Score (PT): 7  AM-PAC 6 Clicks Score (OT): 8    Ximena Ferrera PTA  2/14/2021

## 2021-02-14 NOTE — PROGRESS NOTES
[facility]    Name:  Bossman Luna   Age:  55 y.o.  Sex:  male  :  1965  MRN:  [id number]   Visit Number:  [id number]  Admission Date:  2/10/2021  Date Of Service:  21  Primary Care Physician:  Hanna Quinones APRN     LOS: 4 days :  Patient Care Team:  Hanna Quinones APRN as PCP - General (Family Medicine):    Chief Complaint:      Hip fracture    Subjective / Interval History:     Patient seen and examined resting comfortably in bed. No family present at bedside. Patient very pleasant. No complaints this morning. No acute events overnight.     Prior work-up:  Patient is a pleasant 55-year-old male with history of intellectual developmental disability and neurogenic bladder who was brought in after being found on the floor complaining of pain.  This was an unwitnessed fall.  Upon evaluation the emergency room patient was found to have a closed fracture of the right hip.  Dr. Sagastume was consulted.  Patient underwent surgical correction on  with no postop complications noted.  He is currently awaiting hospital bed to be delivered to home prior to discharge.    Review of Systems:     Unable to obtain due to clinical presentation    Vital Signs:    Temp:  [97.3 °F (36.3 °C)-99 °F (37.2 °C)] 97.8 °F (36.6 °C)  Heart Rate:  [] 70  Resp:  [16-18] 16  BP: (106-139)/(55-68) 108/57    Intake and output:    I/O last 3 completed shifts:  In: 1440 [P.O.:1440]  Out: 6975 [Urine:6975]  I/O this shift:  In: -   Out: 450 [Urine:450]    Physical Examination:  Examined again     General Appearance:  Alert and cooperative, not in any acute distress.   Head:  Atraumatic and normocephalic, without obvious abnormality.   Eyes:          PERRLA, conjunctivae and sclerae normal, No pallor. Extraocular movements are within normal limits.   Neck: Supple, trachea midline, no thyromegaly, no carotid bruit.   Lungs:   Chest shape is normal. Breath sounds heard  bilaterally equally.  No crackles or wheezing.    Heart:  Normal S1 and S2, no murmur,  No JVD   Abdomen:   Normal bowel sounds,  Soft, nontender, nondistended, no guarding, no rebound tenderness.   Extremities: no edema, no cyanosis, no clubbing.   Skin: No bleeding, bruising or rash.   Neurologic: Awake, alert.      Laboratory results:    Results from last 7 days   Lab Units 02/13/21  0654 02/12/21  0625 02/10/21  2237   SODIUM mmol/L 135* 135* 131*   POTASSIUM mmol/L 3.5 3.7 3.4*   CHLORIDE mmol/L 97* 98 94*   CO2 mmol/L 31.5* 29.9* 29.7*   BUN mg/dL 5* 6 15   CREATININE mg/dL 0.61* 0.70* 0.78   CALCIUM mg/dL 8.2* 8.2* 8.3*   BILIRUBIN mg/dL  --   --  0.3   ALK PHOS U/L  --   --  59   ALT (SGPT) U/L  --   --  16   AST (SGOT) U/L  --   --  20   GLUCOSE mg/dL 120* 112* 105*     Results from last 7 days   Lab Units 02/13/21  0654 02/12/21  0625 02/10/21  2237   WBC 10*3/mm3 5.20 4.41 7.23   HEMOGLOBIN g/dL 10.5* 10.8* 12.6*   HEMATOCRIT % 31.0* 32.2* 37.2*   PLATELETS 10*3/mm3 138* 133* 156     Results from last 7 days   Lab Units 02/10/21  2237   INR  1.10                   I have reviewed the patient's laboratory results.    Radiology results:    Imaging Results (Last 24 Hours)     ** No results found for the last 24 hours. **          I have reviewed the patient's radiology reports.    Medication Review:     I have reviewed the patient's active and prn medications.       Closed fracture of right hip (CMS/HCC)    Intellectual disability    Anxiety associated with depression    Neurogenic bladder    Hypothyroidism (acquired)    Fall      Assessment:      Closed fracture of right hip    Intellectual disability    Anxiety associated with depression    Neurogenic bladder    Hypothyroidism (acquired)    Fall    Plan:    Continue to monitor in the hospital. Patient underwent surgical intervention by Dr. Sagastume on 2/11, no post-op complications. Did have expected post-op blood loss, but does not meet criteria for PRBC  transfusion. Continue PT/OT, supportive care.  Appreciate case management's assistance with discharge planning.  Patient will need home health with physical therapy.  The patient requires positioning of the body in ways not feasible with an ordinary bed in order to alleviate pain.  Hospital bed is recommended.  Further orders as clinical course dictates.    Lovenox for DVT prophylaxis.  Follow-up Dr. Sagastume in 2 weeks.  Plan for discharge on Monday once hospital bed can be delivered.     Juan J Vital DO  02/14/21  12:47 EST    Dictated utilizing Dragon dictation.

## 2021-02-14 NOTE — PROGRESS NOTES
Patient is seen today at bedside he is without any complaint    Is dressing is clean dry and intact    He is wiggling his foot    My impression is status post right hip hemiarthroplasty for fracture.  Plan is for physical therapy with weightbearing as tolerated with posterior hip precautions  Follow-up in 2 weeks

## 2021-02-14 NOTE — PLAN OF CARE
Goal Outcome Evaluation:  Plan of Care Reviewed With: patient  Progress: no change  Outcome Summary: Pt in supine with B knees flexed with L >R. Pt with R knee with decreased full ext L knee in full ext. Performed PROM/AAROM B LE in supine in all planes following hip precautions without c/o significant increase in pain 3-4/1. Repositioned pt after rolling side to side, and placed abd wedge with good optimal alignment R hip.  Con't with PT POC and progress as tolerated

## 2021-02-15 LAB
BASOPHILS # BLD AUTO: 0.03 10*3/MM3 (ref 0–0.2)
BASOPHILS NFR BLD AUTO: 0.7 % (ref 0–1.5)
DEPRECATED RDW RBC AUTO: 47.2 FL (ref 37–54)
EOSINOPHIL # BLD AUTO: 0.13 10*3/MM3 (ref 0–0.4)
EOSINOPHIL NFR BLD AUTO: 2.9 % (ref 0.3–6.2)
ERYTHROCYTE [DISTWIDTH] IN BLOOD BY AUTOMATED COUNT: 13 % (ref 12.3–15.4)
HCT VFR BLD AUTO: 25.7 % (ref 37.5–51)
HGB BLD-MCNC: 8.9 G/DL (ref 13–17.7)
IMM GRANULOCYTES # BLD AUTO: 0.02 10*3/MM3 (ref 0–0.05)
IMM GRANULOCYTES NFR BLD AUTO: 0.5 % (ref 0–0.5)
LYMPHOCYTES # BLD AUTO: 0.9 10*3/MM3 (ref 0.7–3.1)
LYMPHOCYTES NFR BLD AUTO: 20.3 % (ref 19.6–45.3)
MCH RBC QN AUTO: 34.6 PG (ref 26.6–33)
MCHC RBC AUTO-ENTMCNC: 34.6 G/DL (ref 31.5–35.7)
MCV RBC AUTO: 100 FL (ref 79–97)
MONOCYTES # BLD AUTO: 0.59 10*3/MM3 (ref 0.1–0.9)
MONOCYTES NFR BLD AUTO: 13.3 % (ref 5–12)
NEUTROPHILS NFR BLD AUTO: 2.77 10*3/MM3 (ref 1.7–7)
NEUTROPHILS NFR BLD AUTO: 62.3 % (ref 42.7–76)
NRBC BLD AUTO-RTO: 0 /100 WBC (ref 0–0.2)
PLATELET # BLD AUTO: 165 10*3/MM3 (ref 140–450)
PMV BLD AUTO: 9.9 FL (ref 6–12)
RBC # BLD AUTO: 2.57 10*6/MM3 (ref 4.14–5.8)
WBC # BLD AUTO: 4.44 10*3/MM3 (ref 3.4–10.8)

## 2021-02-15 PROCEDURE — 97530 THERAPEUTIC ACTIVITIES: CPT

## 2021-02-15 PROCEDURE — 25010000002 ENOXAPARIN PER 10 MG: Performed by: ORTHOPAEDIC SURGERY

## 2021-02-15 PROCEDURE — 85025 COMPLETE CBC W/AUTO DIFF WBC: CPT | Performed by: INTERNAL MEDICINE

## 2021-02-15 PROCEDURE — 99232 SBSQ HOSP IP/OBS MODERATE 35: CPT | Performed by: FAMILY MEDICINE

## 2021-02-15 RX ORDER — HYDROCODONE BITARTRATE AND ACETAMINOPHEN 7.5; 325 MG/1; MG/1
1 TABLET ORAL EVERY 4 HOURS PRN
Qty: 18 TABLET | Refills: 0 | Status: SHIPPED | OUTPATIENT
Start: 2021-02-15 | End: 2021-02-20

## 2021-02-15 RX ORDER — ACETAMINOPHEN 325 MG/1
650 TABLET ORAL EVERY 6 HOURS PRN
Qty: 30 TABLET | Refills: 0 | Status: SHIPPED | OUTPATIENT
Start: 2021-02-15

## 2021-02-15 RX ADMIN — BUDESONIDE 3 MG: 3 CAPSULE, GELATIN COATED ORAL at 08:26

## 2021-02-15 RX ADMIN — RISPERIDONE 4 MG: 1 TABLET ORAL at 20:45

## 2021-02-15 RX ADMIN — DOCUSATE SODIUM 100 MG: 100 CAPSULE ORAL at 08:26

## 2021-02-15 RX ADMIN — ENOXAPARIN SODIUM 40 MG: 40 INJECTION SUBCUTANEOUS at 08:25

## 2021-02-15 RX ADMIN — CLONAZEPAM 0.5 MG: 0.5 TABLET ORAL at 07:00

## 2021-02-15 RX ADMIN — CLONAZEPAM 0.5 MG: 0.5 TABLET ORAL at 13:19

## 2021-02-15 RX ADMIN — LEVOTHYROXINE SODIUM 88 MCG: 88 TABLET ORAL at 08:25

## 2021-02-15 RX ADMIN — SODIUM CHLORIDE, PRESERVATIVE FREE 10 ML: 5 INJECTION INTRAVENOUS at 20:45

## 2021-02-15 RX ADMIN — METOPROLOL SUCCINATE 25 MG: 25 TABLET, EXTENDED RELEASE ORAL at 08:26

## 2021-02-15 RX ADMIN — FLUTICASONE PROPIONATE 2 SPRAY: 50 SPRAY, METERED NASAL at 08:29

## 2021-02-15 RX ADMIN — FLUOXETINE 20 MG: 20 CAPSULE ORAL at 08:26

## 2021-02-15 RX ADMIN — CLONAZEPAM 0.5 MG: 0.5 TABLET ORAL at 22:00

## 2021-02-15 RX ADMIN — SODIUM CHLORIDE, PRESERVATIVE FREE 10 ML: 5 INJECTION INTRAVENOUS at 08:29

## 2021-02-15 RX ADMIN — Medication 10 MG: at 20:46

## 2021-02-15 RX ADMIN — TAMSULOSIN HYDROCHLORIDE 0.4 MG: 0.4 CAPSULE ORAL at 20:46

## 2021-02-15 RX ADMIN — RISPERIDONE 2 MG: 1 TABLET ORAL at 08:26

## 2021-02-15 RX ADMIN — PANTOPRAZOLE SODIUM 40 MG: 40 TABLET, DELAYED RELEASE ORAL at 20:46

## 2021-02-15 RX ADMIN — POLYETHYLENE GLYCOL 3350 17 G: 17 POWDER, FOR SOLUTION ORAL at 08:25

## 2021-02-15 NOTE — THERAPY TREATMENT NOTE
Patient Name: Bossman Luna  : 1965    MRN: [id number]                              Today's Date: 2/15/2021       Admit Date: 2/10/2021    Visit Dx:     ICD-10-CM ICD-9-CM   1. Closed fracture of right hip, initial encounter (CMS/[facility])  S72.001A 820.8   2. Closed fracture of hip (CMS/[facility])  S72.009A 820.8     Patient Active Problem List   Diagnosis   • Closed fracture of right hip (CMS/[facility])   • Intellectual disability   • Anxiety associated with depression   • Neurogenic bladder   • Hypothyroidism (acquired)   • Fall     Past Medical History:   Diagnosis Date   • BPH with urinary obstruction    • Cataract     bilateral   • Development delay    • GERD (gastroesophageal reflux disease)    • Glaucoma    • Hypertension    • Impaired functional mobility, balance, gait, and endurance    • Neurogenic bladder    • Osteoporosis    • Prostate disorder    • Rash    • Self-catheterizes urinary bladder    • Thyroid disease    • Urinary retention      Past Surgical History:   Procedure Laterality Date   • CATARACT EXTRACTION W/ INTRAOCULAR LENS IMPLANT Left 3/9/2020    Procedure: CATARACT PHACO EXTRACTION WITH INTRAOCULAR LENS IMPLANT LEFT;  Surgeon: Winifred Peraza MD;  Location: [facility];  Service: Ophthalmology;  Laterality: Left;   • COLONOSCOPY     • ENDOSCOPY     • HIP HEMIARTHROPLASTY Right 2021    Procedure: HIP HEMIARTHROPLASTY;  Surgeon: Go Sagastume MD;  Location: [facility];  Service: Orthopedics;  Laterality: Right;     General Information     Row Name 02/15/21 1420          Physical Therapy Time and Intention    Document Type  therapy note (daily note)  -RM     Mode of Treatment  physical therapy  -RM     Row Name 02/15/21 142          General Information    Patient Profile Reviewed  yes  -RM     Existing Precautions/Restrictions  hip, posterior;right  -RM     Row Name 02/15/21 1420          Cognition    Orientation Status (Cognition)  oriented to;person  -RM     Row Name 02/15/21 1422           Safety Issues, Functional Mobility    Safety Issues Affecting Function (Mobility)  ability to follow commands;insight into deficits/self-awareness;safety precaution awareness;safety precautions follow-through/compliance  -RM     Impairments Affecting Function (Mobility)  coordination;cognition;strength;endurance/activity tolerance  -RM       User Key  (r) = Recorded By, (t) = Taken By, (c) = Cosigned By    Initials Name Provider Type    Luca Mcfarlane, ROSE Physical Therapy Assistant        Mobility     Row Name 02/15/21 1420          Bed Mobility    Rolling Left [place] (Bed Mobility)  moderate assist (50% patient effort);verbal cues  -RM     Rolling Right [place] (Bed Mobility)  moderate assist (50% patient effort);verbal cues  -RM     Supine-Sit [place] (Bed Mobility)  moderate assist (50% patient effort);verbal cues  -RM     Assistive Device (Bed Mobility)  head of bed elevated;draw sheet  -RM     Row Name 02/15/21 1420          Bed-Chair Transfer    Bed-Chair [place] (Transfers)  moderate assist (50% patient effort);verbal cues  -RM     Assistive Device (Bed-Chair Transfers)  other (see comments) gt belt  -RM     Row Name 02/15/21 1420          Sit-Stand Transfer    Sit-Stand [place] (Transfers)  minimum assist (75% patient effort);moderate assist (50% patient effort);verbal cues  -RM     Assistive Device (Sit-Stand Transfers)  other (see comments) gt belt  -RM     Row Name 02/15/21 1420          Gait/Stairs (Locomotion)    [place] Level (Gait)  unable to assess  -RM     Row Name 02/15/21 1420          Mobility    Right Lower Extremity (Weight-bearing Status)  weight-bearing as tolerated (WBAT)  -RM       User Key  (r) = Recorded By, (t) = Taken By, (c) = Cosigned By    Initials Name Provider Type    Luca Mcfarlane, ROSE Physical Therapy Assistant        Obj/Interventions    No documentation.       Goals/Plan    No documentation.       Clinical Impression     Row Name  02/15/21 1422          Pain    Additional Documentation  Pain Scale: FACES Pre/Post-Treatment (Group)  -RM     Row Name 02/15/21 1422          Pain Scale: FACES Pre/Post-Treatment    Pain: FACES Scale, Pretreatment  2-->hurts little bit  -RM     Posttreatment Pain Rating  2-->hurts little bit  -RM     Pain Location - Side  Right  -RM     Pain Location - Orientation  lower  -RM     Pain Location  extremity  -RM     Row Name 02/15/21 1422          Plan of Care Review    Plan of Care Reviewed With  patient  -RM     Progress  improving  -RM     Outcome Summary  Pt tolerated treatment well performing rolling L and R as well as supine to sit with min/mod a. Pt performed partial stand with min a and transferred from bed to chair with mod a. A gait belt was used during transfers.  See flowsheet for details.  -RM     Row Name 02/15/21 1422          Positioning and Restraints    Pre-Treatment Position  in bed  -RM     Post Treatment Position  chair  -RM     In Chair  reclined;call light within reach;encouraged to call for assist;exit alarm on;notified nsg  -RM       User Key  (r) = Recorded By, (t) = Taken By, (c) = Cosigned By    Initials Name Provider Type    RM Luca Covington, PTA Physical Therapy Assistant        Outcome Measures     Row Name 02/15/21 1425          How much help from another person do you currently need...    Turning from your back to your side while in flat bed without using bedrails?  2  -RM     Moving from lying on back to sitting on the side of a flat bed without bedrails?  2  -RM     Moving to and from a bed to a chair (including a wheelchair)?  2  -RM     Standing up from a chair using your arms (e.g., wheelchair, bedside chair)?  3  -RM     Climbing 3-5 steps with a railing?  1  -RM     To walk in hospital room?  1  -RM     AM-PAC 6 Clicks Score (PT)  11  -RM     Row Name 02/15/21 1425          Functional Assessment    Outcome Measure Options  AM-PAC 6 Clicks Basic Mobility (PT)  -RM       User  Key  (r) = Recorded By, (t) = Taken By, (c) = Cosigned By    Initials Name Provider Type     Luca Covington, PTA Physical Therapy Assistant        Physical Therapy Education                 Title: PT OT SLP Therapies (In Progress)     Topic: Physical Therapy (In Progress)     Point: Mobility training (Done)     Learning Progress Summary           Patient Acceptance, E,TB,D, VU,NR by  at 2/15/2021 1425    Comment: Pt requires multiple verbal and tactile cues    Acceptance, E, NR,NL by  at 2/13/2021 1103    Acceptance, E, VU,NL by  at 2/13/2021 0240    Acceptance, E,TB,D, VU,NR,NL by  at 2/12/2021 1617    Comment: THP and sequencing for mobility    Acceptance, E,TB, NR,NL by  at 2/12/2021 1225    Comment: Purpose of PT/POC.                   Point: Home exercise program (In Progress)     Learning Progress Summary           Patient Acceptance, E,TB, NR,NL by  at 2/12/2021 1225    Comment: Purpose of PT/POC.                   Point: Precautions (Done)     Learning Progress Summary           Patient Acceptance, E,TB, VU by  at 2/14/2021 1052    Comment: educated pt on importance R LE positioning    Acceptance, E,TB, NR,NL by  at 2/12/2021 1225    Comment: Purpose of PT/POC.                               User Key     Initials Effective Dates Name Provider Type Discipline     04/03/18 -  Albina Issa, PT Physical Therapist PT    CC 03/07/18 -  Ximena Ferrera, PTA Physical Therapy Assistant PT     03/07/18 -  Luca Covington, PTA Physical Therapy Assistant PT     10/15/19 -  Nicole Reed PTA Physical Therapy Assistant PT     12/14/20 -  Brian Vega, RN Registered Nurse Nurse              PT Recommendation and Plan     Plan of Care Reviewed With: patient  Progress: improving  Outcome Summary: Pt tolerated treatment well performing rolling L and R as well as supine to sit with min/mod a. Pt performed partial stand with min a and transferred from bed to chair with mod a. A gait  belt was used during transfers.  See flowsheet for details.     Time Calculation:   PT Charges     Row Name 02/15/21 1427             Time Calculation    Start Time  1325  -RM      Stop Time  1410  -RM      Time Calculation (min)  45 min  -RM      PT Received On  02/15/21  -RM      PT Goal Re-Cert Due Date  02/22/21  -RM         Time Calculation- PT    Total Timed Code Minutes- PT  45 minute(s)  -RM         Timed Charges    59066 - PT Therapeutic Activity Minutes  45  -RM        User Key  (r) = Recorded By, (t) = Taken By, (c) = Cosigned By    Initials Name Provider Type    Luca Mcfarlane, ROSE Physical Therapy Assistant        Therapy Charges for Today     Code Description Service Date Service Provider Modifiers Qty    97481807561 HC PT THERAPEUTIC ACT EA 15 MIN 2/15/2021 Luca Covington PTA GP 3          PT G-Codes  Outcome Measure Options: AM-PAC 6 Clicks Basic Mobility (PT)  AM-PAC 6 Clicks Score (PT): 11  AM-PAC 6 Clicks Score (OT): 8    Luca Covington PTA  2/15/2021

## 2021-02-15 NOTE — PROGRESS NOTES
"Continued Stay Note  [facility]     Patient Name: Bossman Luna  MRN: [id number]  Today's Date: 2/15/2021    Admit Date: 2/10/2021    Discharge Plan     Row Name 02/15/21 1115       Plan    Plan Comments  Plan is for pt to return to [facility] with hospital bed and HH ([facility]). SW called to [facility] ([phone]) r/t hospital bed, spoke with Dorothea. She did receive orders, they are able to delivery bed, but unsure if they will be able to today due to weather. Dorothea states herself or someone from the office will call back with an update on delivery today. Will follow-up shortly if no callback received. Will updated physician when able. Will need EMS transport as well.    Called to [facility], spoke with Kim. They are requesting HH orders. Informed them we are still unsure if pt will dc home today or tomorrow. Pending hospital bed delivery. Will fax HH orders once able (Fax: [phone]; Phone: [phone]).     12:54 EST  Faxed HH orders to [facility].   Called back to [facility], they cannot deliver until Thursday or Friday.   Spoke with Elzbieta at [facility], they are shutting office down early due to weather and only working on \"life sustaining\" DME right now due to safety concerns. They may be able to get to delivery on Wed. Just depends on the weather.     14:02 EST  Received message from Lorraine at [facility], they did accept HH referral.     16:09 EST  SW called to mother, Dr. Álvarez had discussed rehab with mother and SW followed up. Mother reports she spoke with the director and care givers at [facility], and she wants pt back there. She states they \"know and understand\" pt there and she \"knows he will be taken care of and safe\" there. Mother does want hospital bed and HH. Informed her [facility] had accepted and bed should be delivered Thursday or Friday pending weather.   OSCAR called to Edel at [facility] per mother request. Edel states they want pt back and can accept him once " they are able to get e temporary hospital bed there. She reports that have one at another location and she will have maintance bring to his home, hopefully tomorrow, pending weather, and pt can return via EMS tomorrow.     CM to call [facility] tomorrow to see if bed is able to be delivered so pt can d/c back. Updated Dr. Álvarez. Mother happy and in agreement with plan.         Discharge Codes    No documentation.                   VICENTE Hernandez

## 2021-02-15 NOTE — PLAN OF CARE
Goal Outcome Evaluation:  Plan of Care Reviewed With: patient  Progress: improving    Problem: Adult Inpatient Plan of Care  Goal: Plan of Care Review  Recent Flowsheet Documentation  Taken 2/15/2021 1422 by Luca Covington PTA  Progress: improving  Plan of Care Reviewed With: patient  Outcome Summary: Pt tolerated treatment well performing rolling L and R as well as supine to sit with min/mod a. Pt performed partial stand with min a and transferred from bed to chair with mod a. A gait belt was used during transfers.  Pt demonstrated improved ability to follow direction this treatment also. See flowsheet for details.

## 2021-02-15 NOTE — DISCHARGE SUMMARY
[facility]   DISCHARGE SUMMARY      Name:  Bossman Luna   Age:  55 y.o.  Sex:  male  :  1965  MRN:  [id number]   Visit Number:  [id number]  Primary Care Physician:  Hanna Quinones APRN  Date of Discharge:  2/15/2021  Admission Date:  2/10/2021    Discharge Diagnosis:       Closed fracture of right hip, post op day #4 Right hip hemiarthroplasty    Intellectual disability    Anxiety associated with depression    Neurogenic bladder    Hypothyroidism (acquired)    Fall  History of Present Illness/Hospital Course:      Patient is a pleasant 55-year-old male with history of intellectual developmental disability and neurogenic bladder who was brought in after being found on the floor complaining of pain.  This was an unwitnessed fall.  Upon evaluation the emergency room patient was found to have a closed fracture of the right hip.  Dr. Sagastume was consulted.  Patient underwent surgical correction on  with no postop complications noted.  He is currently awaiting hospital bed to be delivered to home prior to discharge.    The patient lives at [facility] and they have stated they do have a bed in storage but they will need to get it before he can return home.  I called the patient's mother who was okay with the plan to return to [facility] once they are ready for him.  Social work assisted in the discharge plan and I have had to cancel the discharge for today and plan it for tomorrow instead when they can have a bed ready.  In the meantime, I went ahead and sent his prescription of Lortab, Lovenox, and Tylenol.        Consults:     Consults     Date and Time Order Name Status Description    2021 0819 Orthopedics (on-call MD unless specified) Completed           Procedures Performed:    Procedure(s):  HIP HEMIARTHROPLASTY         Vital Signs:    Temp:  [97.5 °F (36.4 °C)-98.4 °F (36.9 °C)] 98 °F (36.7 °C)  Heart Rate:  [] 95  Resp:  [17-18] 18  BP:  (113-126)/(59-67) 120/63    Physical Exam:      General Appearance:   Appears older than stated age.  Frail, alert, cooperative, in no acute distress.  Exam difficult due to paranoia   Head:    Normocephalic, without obvious abnormality, atraumatic   Eyes:            Lids and lashes normal, conjunctivae and sclerae normal, no   icterus, no pallor, corneas clear, PERRLA   Ears:    Ears appear intact with no abnormalities noted   Throat:   No oral lesions, no thrush, oral mucosa moist   Neck:   No adenopathy, supple, trachea midline, no thyromegaly       Lungs:     Clear to auscultation,respirations regular, even and                  unlabored    Heart:    Regular rhythm and normal rate, normal S1 and S2, no            murmur, no gallop, no rub, no click   Chest Wall:    No abnormalities observed   Abdomen:     Normal bowel sounds, no masses, no organomegaly, soft        non-tender, non-distended, no guarding, no rebound                tenderness   Rectal:     Deferred   Extremities:   Moves all extremities well, no edema, no cyanosis, no             Redness; lies on his right side with his legs curled up in a ball   Pulses:   Pulses palpable and equal bilaterally   Skin:   No bleeding, bruising or rash   Lymph nodes:   No palpable adenopathy   Neurologic:  No tremor, sensation intact, does not easily follow commands         Pertinent Lab Results:     Results from last 7 days   Lab Units 02/13/21  0654 02/12/21  0625 02/10/21  2237   SODIUM mmol/L 135* 135* 131*   POTASSIUM mmol/L 3.5 3.7 3.4*   CHLORIDE mmol/L 97* 98 94*   CO2 mmol/L 31.5* 29.9* 29.7*   BUN mg/dL 5* 6 15   CREATININE mg/dL 0.61* 0.70* 0.78   CALCIUM mg/dL 8.2* 8.2* 8.3*   BILIRUBIN mg/dL  --   --  0.3   ALK PHOS U/L  --   --  59   ALT (SGPT) U/L  --   --  16   AST (SGOT) U/L  --   --  20   GLUCOSE mg/dL 120* 112* 105*     Results from last 7 days   Lab Units 02/15/21  0508 02/13/21  0654 02/12/21  0625   WBC 10*3/mm3 4.44 5.20 4.41   HEMOGLOBIN g/dL  8.9* 10.5* 10.8*   HEMATOCRIT % 25.7* 31.0* 32.2*   PLATELETS 10*3/mm3 165 138* 133*     Results from last 7 days   Lab Units 02/10/21  2237   INR  1.10                               Invalid input(s): USDES,  BLOODU, NITRITITE, BACT, EP  Pain Management Panel     There is no flowsheet data to display.              Pertinent Radiology Results:    Imaging Results (All)     Procedure Component Value Units Date/Time    XR Hip With or Without Pelvis 2 - 3 View Right [[id number]] Collected: 02/13/21 0749     Updated: 02/13/21 0753    Narrative:      FRONTAL PELVIS  AND RIGHT HIP RADIOGRAPHS     HISTORY: Right hip pain.     COMPARISON: None.     FINDINGS:  A two view exam demonstrates right hip hemiarthroplasty. No  fracture. The pelvis is intact. Expected soft tissue gas and edema of  the proximal right thigh. Murphy catheter noted.           Impression:      No complication identified.     This report was finalized on 2/13/2021 7:51 AM by Dr Francisco Pardo DO.     [facility] OR Procedure [[id number]] Resulted: 02/11/21 1409     Updated: 02/11/21 1409    XR Hips Bilateral With or Without Pelvis 2 View [[id number]] Collected: 02/11/21 0747     Updated: 02/11/21 0751    Narrative:      PROCEDURE: XR HIPS BILATERAL W OR WO PELVIS 2 VIEW-     HISTORY: pain right hip after fall     COMPARISON: None.     FINDINGS: There is a right femoral neck fracture. No other fractures or  dislocations are evident. The pubic symphysis and SI joints are intact.  The soft tissues are unremarkable.       Impression:      Right femoral neck fracture.     This report was finalized on 2/11/2021 7:48 AM by Noemi Francois M.D..                 Condition on Discharge:  Stable        Code status during the hospital stay: Full code      Discharge Disposition:    Home-Health Care [facility]    Discharge Medication:       Discharge Medications      New Medications      Instructions Start Date   enoxaparin 40 MG/0.4ML solution syringe  Commonly known as: LOVENOX    40 mg, Subcutaneous, Daily   Start Date: February 16, 2021     HYDROcodone-acetaminophen 7.5-325 MG per tablet  Commonly known as: NORCO   1 tablet, Oral, Every 4 Hours PRN         Changes to Medications      Instructions Start Date   acetaminophen 500 MG tablet  Commonly known as: TYLENOL  What changed: Another medication with the same name was added. Make sure you understand how and when to take each.   1,000 mg, Oral, 3 Times Daily, 0800, 1200, 2000  Take two tablets three times daily while awake and two tablets as needed.      acetaminophen 325 MG tablet  Commonly known as: TYLENOL  What changed: You were already taking a medication with the same name, and this prescription was added. Make sure you understand how and when to take each.   650 mg, Oral, Every 6 Hours PRN      melatonin 5 MG tablet tablet  What changed: Another medication with the same name was removed. Continue taking this medication, and follow the directions you see here.   10 mg, Oral, Nightly      tamsulosin 0.4 MG capsule 24 hr capsule  Commonly known as: FLOMAX  What changed: See the new instructions.   TAKE ONE CAPSULE BY MOUTH NIGHTLY AT BEDTIME         Continue These Medications      Instructions Start Date   Ambien 5 MG tablet  Generic drug: zolpidem   10 mg, Oral, Nightly PRN      BOOST SMOOTHIE PO   1 can, Oral, 2 Times Daily PRN      Budesonide 3 MG 24 hr capsule  Commonly known as: ENTOCORT EC   9 mg, Oral, Daily      clonazePAM 0.5 MG tablet  Commonly known as: KlonoPIN   0.75 mg, Oral, 4 Times Daily, Taking 1 1/2 tablets 0800, 1100, 1400, nightly      docusate sodium 250 MG capsule  Commonly known as: COLACE   250 mg, Oral, Nightly      FLUoxetine 20 MG capsule  Commonly known as: PROzac   20 mg, Oral, Daily      fluticasone 50 MCG/ACT nasal spray  Commonly known as: FLONASE   2 sprays, Nasal, Daily      levothyroxine 88 MCG tablet  Commonly known as: SYNTHROID, LEVOTHROID   88 mcg, Oral, Daily      metoprolol succinate XL 25 MG 24  hr tablet  Commonly known as: TOPROL-XL   25 mg, Oral, Daily      multivitamin tablet tablet  Generic drug: multivitamin   1 tablet, Oral, Daily      Oyster Shell Calcium Plus D 500-200 MG-UNIT tablet  Generic drug: Calcium Carb-Cholecalciferol   1 tablet, Oral, Daily      pantoprazole 40 MG EC tablet  Commonly known as: PROTONIX   40 mg, Oral, Nightly      risperiDONE 2 MG tablet  Commonly known as: risperDAL   2 mg, Oral, Every Morning      risperiDONE 4 MG tablet  Commonly known as: risperDAL   4 mg, Oral, Nightly         Stop These Medications    calcium carbonate (oyster shell) 500 MG tablet tablet     calcium citrate-vitamin d 200-250 MG-UNIT tablet tablet  Commonly known as: CITRACAL     losartan 25 MG tablet  Commonly known as: COZAAR     metoprolol tartrate 25 MG tablet  Commonly known as: LOPRESSOR     multivitamin with minerals tablet tablet     potassium chloride 10 MEQ CR tablet  Commonly known as: K-DUR,KLOR-CON            Discharge Diet:     Diet Instructions     Diet: Regular      Discharge Diet: Regular          Activity at Discharge:     Activity Instructions     Up WIth Assist            Follow-up Appointments:    No future appointments.  Additional Instructions for the Follow-ups that You Need to Schedule     Ambulatory Referral to Home Health   As directed      Face to Face Visit Date: 2/15/2021    Follow-up provider for Plan of Care?: I treated the patient in an acute care facility and will not continue treatment after discharge.    Follow-up provider: VERONICA BOYD [[id number]]    Reason/Clinical Findings: right hip fracture    Describe mobility limitations that make leaving home difficult: taxing effort to leave home    Nursing/Therapeutic Services Requested: Occupational Therapy Physical Therapy Skilled Nursing    Skilled nursing orders: Medication education Wound care dressing/changes    Instructions: dry dressing change daily    PT orders: Home safety assessment Strengthening Therapeutic  exercise    Occupational orders: Home safety assessment Strengthening Cognition Activities of daily living    Frequency: 1 Week 1         Discharge Follow-up with PCP   As directed       Currently Documented PCP:    Hanna Quinones APRN    PCP Phone Number:    None     Follow Up Details: 1 week         Discharge Follow-up with Specialty: Dr Sagastume; 2 Weeks   As directed      Specialty: Dr Sagastume    Follow Up: 2 Weeks               Test Results Pending at Discharge:    Pending Labs     Order Current Status    Tissue Pathology Exam In process             Maria Eugenia Álvarez DO  02/15/21  12:31 EST      Medication risks and benefits were discussed in great detail. The patient and his mother by phone reported being satisfied with the current treatment plan and the care delivered while hospitalized.     Time:  I spent 35 minutes preparing discharge counseling and teaching.

## 2021-02-15 NOTE — PLAN OF CARE
Problem: Adult Inpatient Plan of Care  Goal: Plan of Care Review  Outcome: Ongoing, Progressing  Goal: Patient-Specific Goal (Individualized)  Outcome: Ongoing, Progressing  Goal: Absence of Hospital-Acquired Illness or Injury  Outcome: Ongoing, Progressing  Intervention: Identify and Manage Fall Risk  Recent Flowsheet Documentation  Taken 2/15/2021 1230 by Fifi Duckworth RN  Safety Promotion/Fall Prevention:   activity supervised   assistive device/personal items within reach   clutter free environment maintained   nonskid shoes/slippers when out of bed  Taken 2/15/2021 1020 by Fifi Duckworth RN  Safety Promotion/Fall Prevention:   activity supervised   assistive device/personal items within reach   clutter free environment maintained   nonskid shoes/slippers when out of bed  Taken 2/15/2021 0826 by Fifi Duckworth RN  Safety Promotion/Fall Prevention:   activity supervised   assistive device/personal items within reach   clutter free environment maintained  Intervention: Prevent Skin Injury  Recent Flowsheet Documentation  Taken 2/15/2021 1230 by Fifi Duckworth RN  Body Position: position changed independently  Taken 2/15/2021 1020 by Fifi Duckworth RN  Body Position: position changed independently  Taken 2/15/2021 0826 by Fifi Duckworth RN  Body Position: position changed independently  Intervention: Prevent Infection  Recent Flowsheet Documentation  Taken 2/15/2021 1230 by Fifi Duckworth RN  Infection Prevention: rest/sleep promoted  Taken 2/15/2021 1020 by Fifi Duckworth RN  Infection Prevention: rest/sleep promoted  Taken 2/15/2021 0826 by Fifi Duckworth RN  Infection Prevention: rest/sleep promoted  Goal: Optimal Comfort and Wellbeing  Outcome: Ongoing, Progressing  Intervention: Provide Person-Centered Care  Recent Flowsheet Documentation  Taken 2/15/2021 1230 by Fifi Duckworth RN  Trust Relationship/Rapport:   care explained   questions answered  Taken 2/15/2021 0826 by Fifi Duckworth RN  Trust Relationship/Rapport:   care  explained   questions answered  Goal: Readiness for Transition of Care  Outcome: Ongoing, Progressing     Problem: Adjustment to Injury (Hip Fracture)  Goal: Optimal Coping with Change in Health Status  Outcome: Ongoing, Progressing  Intervention: Support Psychosocial Response to Injury and Mobility Change  Recent Flowsheet Documentation  Taken 2/15/2021 1230 by Fifi Duckworth RN  Supportive Measures:   active listening utilized   self-care encouraged  Family/Support System Care: support provided  Taken 2/15/2021 0826 by Fifi Duckworth RN  Supportive Measures:   active listening utilized   self-care encouraged  Family/Support System Care: support provided     Problem: Bleeding (Hip Fracture)  Goal: Absence of Bleeding  Outcome: Ongoing, Progressing     Problem: Bowel Elimination Impaired (Hip Fracture)  Goal: Effective Bowel Elimination  Outcome: Ongoing, Progressing     Problem: Delayed Union/Nonunion (Hip Fracture)  Goal: Fracture Stability  Outcome: Ongoing, Progressing     Problem: Delayed Union/Nonunion (Hip Fracture)  Goal: Fracture Stability  Outcome: Ongoing, Progressing     Problem: Embolism (Hip Fracture)  Goal: Absence of Embolism  Outcome: Ongoing, Progressing     Problem: Functional Ability Impaired (Hip Fracture)  Goal: Optimal Functional Performance  Outcome: Ongoing, Progressing  Intervention: Promote Optimal Functional Status  Recent Flowsheet Documentation  Taken 2/15/2021 1230 by Fifi Duckworth RN  Activity Management: activity adjusted per tolerance  Taken 2/15/2021 1020 by Fifi Duckworth RN  Activity Management: activity adjusted per tolerance  Taken 2/15/2021 0826 by Fifi Duckworth RN  Activity Management: activity adjusted per tolerance     Problem: Pain (Hip Fracture)  Goal: Acceptable Pain Level  Outcome: Ongoing, Progressing     Problem: Fall Injury Risk  Goal: Absence of Fall and Fall-Related Injury  Outcome: Ongoing, Progressing  Intervention: Identify and Manage Contributors to Fall Injury  Risk  Recent Flowsheet Documentation  Taken 2/15/2021 1230 by Fifi Duckworth RN  Medication Review/Management: medications reviewed  Taken 2/15/2021 0826 by Fifi Duckworth RN  Medication Review/Management: medications reviewed  Intervention: Promote Injury-Free Environment  Recent Flowsheet Documentation  Taken 2/15/2021 1230 by Fifi Duckworth RN  Safety Promotion/Fall Prevention:   activity supervised   assistive device/personal items within reach   clutter free environment maintained   nonskid shoes/slippers when out of bed  Taken 2/15/2021 1020 by Fifi Duckworth RN  Safety Promotion/Fall Prevention:   activity supervised   assistive device/personal items within reach   clutter free environment maintained   nonskid shoes/slippers when out of bed  Taken 2/15/2021 0826 by Fifi Duckworth RN  Safety Promotion/Fall Prevention:   activity supervised   assistive device/personal items within reach   clutter free environment maintained     Problem: Pain Acute  Goal: Optimal Pain Control  Outcome: Ongoing, Progressing  Intervention: Prevent or Manage Pain  Recent Flowsheet Documentation  Taken 2/15/2021 1230 by Fifi Duckworth RN  Sensory Stimulation Regulation:   care clustered   quiet environment promoted  Sleep/Rest Enhancement:   noise level reduced   regular sleep/rest pattern promoted  Taken 2/15/2021 0826 by Fifi Duckworth RN  Sensory Stimulation Regulation:   care clustered   quiet environment promoted  Sleep/Rest Enhancement:   noise level reduced   regular sleep/rest pattern promoted  Intervention: Optimize Psychosocial Wellbeing  Recent Flowsheet Documentation  Taken 2/15/2021 1230 by Fifi Duckworth RN  Supportive Measures:   active listening utilized   self-care encouraged  Diversional Activities: television  Taken 2/15/2021 0826 by Fifi Duckworth RN  Supportive Measures:   active listening utilized   self-care encouraged  Diversional Activities: television     Problem: Skin Injury Risk Increased  Goal: Skin Health and  Integrity  Outcome: Ongoing, Progressing  Intervention: Optimize Skin Protection  Recent Flowsheet Documentation  Taken 2/15/2021 1230 by Fifi Duckowrth RN  Pressure Reduction Techniques: frequent weight shift encouraged  Head of Bed (HOB): HOB at 20-30 degrees  Pressure Reduction Devices: pressure-redistributing mattress utilized  Skin Protection: incontinence pads utilized  Taken 2/15/2021 1020 by Fifi Duckworth RN  Pressure Reduction Techniques: frequent weight shift encouraged  Head of Bed (HOB): HOB at 20-30 degrees  Pressure Reduction Devices: pressure-redistributing mattress utilized  Taken 2/15/2021 0826 by Fifi Duckworth RN  Pressure Reduction Techniques: frequent weight shift encouraged  Head of Bed (HOB): HOB at 20-30 degrees  Pressure Reduction Devices: pressure-redistributing mattress utilized  Skin Protection: incontinence pads utilized  Intervention: Promote and Optimize Oral Intake  Recent Flowsheet Documentation  Taken 2/15/2021 1230 by Fifi Duckworth RN  Oral Nutrition Promotion: rest periods promoted   Goal Outcome Evaluation:

## 2021-02-15 NOTE — PLAN OF CARE
Goal Outcome Evaluation:      VSS at this time. Pt rested during the night. No c/o pain or discomfort noted. Will continue plan of care.

## 2021-02-15 NOTE — DISCHARGE PLACEMENT REQUEST
"Home Health orders for Mr. Sue.    Please call with any questions!    Thank you!    Symone Jayycarolyn MSCOLTEN    Phone: [phone] or [phone]    Von Sue (55 y.o. Male)     Date of Birth Social Security Number Address Home Phone MRN    1965  [address]  [facility] [address] [phone] [id number]    [employer]eterian Marital Status          None Single       Admission Date Admission Type Admitting Provider Attending Provider Department, Room/Bed    2/10/21 Emergency Maria Eugenia Álvarez, Maria Eugenia Rodriguez,  [facility] MED SURG  4, 426/1    Discharge Date Discharge Disposition Discharge Destination         [place] [facility] [place]              Attending Provider: Maria Eugenia Álvarez DO    Allergies: No Known Allergies    Isolation: None   Infection: None   Code Status: CPR    Ht: 157.5 cm (62\")   Wt: 54.4 kg (120 lb)    Admission Cmt: None   Principal Problem: Closed fracture of right hip (CMS/[facility]) [S72.001A]                 Active Insurance as of 2/10/2021     Primary Coverage     Payor Plan Insurance Group Employer/Plan Group    MEDICARE MEDICARE A & B      Payor Plan Address Payor Plan Phone Number Payor Plan Fax Number Effective Dates    [address] [phone]  5/1/1986 - None Entered    [facility] [id number]       Subscriber Name Subscriber Birth Date Member ID       VON SUE 1965 [id number]           Secondary Coverage     Payor Plan Insurance Group Employer/Plan Group    [place]      Payor Plan Address Payor Plan Phone Number Payor Plan Fax Number Effective Dates    [address] [phone]  7/13/2016 - None Entered    [place] [address]       Subscriber Name Subscriber Birth Date Member ID       VON SUE 1965 [id number]                 Emergency Contacts      (Rel.) Home Phone Work Phone Mobile Phone    Patt Burleson (Mother) [phone] -- --    Edel Brooke (Care Giver) [phone] -- " [phone]    RE BARRIGA (Care Giver) [phone] -- --    Sandy Antony (Sister) -- -- [phone]            Referral Orders (last 24 hours) (24h ago, onward)     Start     Ordered    02/15/21 0000  Ambulatory Referral to Home Health     Question Answer Comment   Face to Face Visit Date: 2/15/2021    Follow-up provider for Plan of Care? I treated the patient in an acute care facility and will not continue treatment after discharge.    Follow-up provider: VERONICA BOYD    Reason/Clinical Findings right hip fracture    Describe mobility limitations that make leaving home difficult: taxing effort to leave home    Nursing/Therapeutic Services Requested Occupational Therapy    Nursing/Therapeutic Services Requested Physical Therapy    Nursing/Therapeutic Services Requested Skilled Nursing    Skilled nursing orders: Medication education    Skilled nursing orders: Wound care dressing/changes    Instructions: dry dressing change daily    PT orders: Home safety assessment    PT orders: Strengthening    PT orders: Therapeutic exercise    Occupational orders: Home safety assessment    Occupational orders: Strengthening    Occupational orders: Cognition    Occupational orders: Activities of daily living    Frequency: 1 Week 1        02/15/21 1231                Discharge Summary    No notes of this type exist for this encounter.         Discharge Order (From admission, onward)     Start     Ordered    02/15/21 1224  Discharge patient  Once     Expected Discharge Date: 02/15/21    Discharge Disposition: Home-Health Care [facility]    Physician of Record for Attribution - Please select from Treatment Team: KOSTAS VASQUEZ [[id number]]    Review needed by CMO to determine Physician of Record: No       Question Answer Comment   Physician of Record for Attribution - Please select from Treatment Team KOSTAS VASQUEZ    Review needed by CMO to determine Physician of Record No        02/15/21 1231

## 2021-02-16 PROCEDURE — 99232 SBSQ HOSP IP/OBS MODERATE 35: CPT | Performed by: FAMILY MEDICINE

## 2021-02-16 PROCEDURE — 25010000002 ENOXAPARIN PER 10 MG: Performed by: ORTHOPAEDIC SURGERY

## 2021-02-16 RX ADMIN — RISPERIDONE 2 MG: 1 TABLET ORAL at 08:33

## 2021-02-16 RX ADMIN — Medication 10 MG: at 22:05

## 2021-02-16 RX ADMIN — FLUTICASONE PROPIONATE 2 SPRAY: 50 SPRAY, METERED NASAL at 08:36

## 2021-02-16 RX ADMIN — CLONAZEPAM 0.5 MG: 0.5 TABLET ORAL at 14:35

## 2021-02-16 RX ADMIN — ENOXAPARIN SODIUM 40 MG: 40 INJECTION SUBCUTANEOUS at 08:33

## 2021-02-16 RX ADMIN — BUDESONIDE 3 MG: 3 CAPSULE, GELATIN COATED ORAL at 08:33

## 2021-02-16 RX ADMIN — CLONAZEPAM 0.5 MG: 0.5 TABLET ORAL at 06:31

## 2021-02-16 RX ADMIN — METOPROLOL SUCCINATE 25 MG: 25 TABLET, EXTENDED RELEASE ORAL at 08:33

## 2021-02-16 RX ADMIN — SODIUM CHLORIDE, PRESERVATIVE FREE 10 ML: 5 INJECTION INTRAVENOUS at 08:36

## 2021-02-16 RX ADMIN — POLYETHYLENE GLYCOL 3350 17 G: 17 POWDER, FOR SOLUTION ORAL at 08:33

## 2021-02-16 RX ADMIN — SODIUM CHLORIDE, PRESERVATIVE FREE 10 ML: 5 INJECTION INTRAVENOUS at 22:07

## 2021-02-16 RX ADMIN — CLONAZEPAM 0.5 MG: 0.5 TABLET ORAL at 22:06

## 2021-02-16 RX ADMIN — RISPERIDONE 4 MG: 1 TABLET ORAL at 22:06

## 2021-02-16 RX ADMIN — TAMSULOSIN HYDROCHLORIDE 0.4 MG: 0.4 CAPSULE ORAL at 22:05

## 2021-02-16 RX ADMIN — LEVOTHYROXINE SODIUM 88 MCG: 88 TABLET ORAL at 08:33

## 2021-02-16 RX ADMIN — FLUOXETINE 20 MG: 20 CAPSULE ORAL at 08:33

## 2021-02-16 RX ADMIN — PANTOPRAZOLE SODIUM 40 MG: 40 TABLET, DELAYED RELEASE ORAL at 22:05

## 2021-02-16 RX ADMIN — DOCUSATE SODIUM 100 MG: 100 CAPSULE ORAL at 08:33

## 2021-02-16 NOTE — PROGRESS NOTES
Continued Stay Note   [facility]     Patient Name: Bossman Luna  MRN: [id number]  Today's Date: 2/16/2021    Admit Date: 2/10/2021    Discharge Plan     Row Name 02/16/21 1124       Plan    Plan  [facility] Edel [phone] stated attempting to [facility] but have a  stuck right now; hopefully today but unsure; informed md; will call us when available        Discharge Codes    No documentation.       Expected Discharge Date and Time     Expected Discharge Date Expected Discharge Time    Feb 16, 2021             Abby Schuler RN

## 2021-02-16 NOTE — PLAN OF CARE
Goal Outcome Evaluation:   VSS. NO pain or discomfort noted. Meds are well tolerated. Will continue plan of care.

## 2021-02-16 NOTE — PLAN OF CARE
Pt. Admitted to the hosp. R/T right hip fracture with surgical intervention. Vital signs have been stable so far this shift. No c/o pain or discomfort. No acute distress noted at this time.     Problem: Adult Inpatient Plan of Care  Goal: Plan of Care Review  Outcome: Ongoing, Progressing  Goal: Patient-Specific Goal (Individualized)  Outcome: Ongoing, Progressing  Goal: Absence of Hospital-Acquired Illness or Injury  Outcome: Ongoing, Progressing  Intervention: Identify and Manage Fall Risk  Recent Flowsheet Documentation  Taken 2/16/2021 1220 by Fifi Duckworth RN  Safety Promotion/Fall Prevention:   activity supervised   assistive device/personal items within reach   clutter free environment maintained   nonskid shoes/slippers when out of bed  Taken 2/16/2021 1030 by Fifi Duckworth RN  Safety Promotion/Fall Prevention:   activity supervised   assistive device/personal items within reach   clutter free environment maintained   nonskid shoes/slippers when out of bed  Taken 2/16/2021 0833 by Fifi Duckworth RN  Safety Promotion/Fall Prevention:   activity supervised   assistive device/personal items within reach   clutter free environment maintained   nonskid shoes/slippers when out of bed  Intervention: Prevent Skin Injury  Recent Flowsheet Documentation  Taken 2/16/2021 1220 by Fifi Duckworth RN  Body Position: position changed independently  Taken 2/16/2021 1030 by Fifi Duckworth RN  Body Position: position changed independently  Taken 2/16/2021 0833 by Fifi Duckworth RN  Body Position: position changed independently  Intervention: Prevent and Manage VTE (venous thromboembolism) Risk  Recent Flowsheet Documentation  Taken 2/16/2021 0833 by Fifi Duckworth RN  VTE Prevention/Management:   bilateral   dorsiflexion/plantar flexion performed  Intervention: Prevent Infection  Recent Flowsheet Documentation  Taken 2/16/2021 1220 by Fifi Duckworth RN  Infection Prevention: rest/sleep promoted  Taken 2/16/2021 1030 by Fifi Duckworth  RN  Infection Prevention: rest/sleep promoted  Taken 2/16/2021 0833 by Fifi Duckworth RN  Infection Prevention: rest/sleep promoted  Goal: Optimal Comfort and Wellbeing  Outcome: Ongoing, Progressing  Intervention: Provide Person-Centered Care  Recent Flowsheet Documentation  Taken 2/16/2021 0833 by Fifi Duckworth RN  Trust Relationship/Rapport:   care explained   questions answered  Goal: Readiness for Transition of Care  Outcome: Ongoing, Progressing     Problem: Adjustment to Injury (Hip Fracture)  Goal: Optimal Coping with Change in Health Status  Outcome: Ongoing, Progressing  Intervention: Support Psychosocial Response to Injury and Mobility Change  Recent Flowsheet Documentation  Taken 2/16/2021 0833 by Fifi Duckworth RN  Supportive Measures:   active listening utilized   self-care encouraged  Family/Support System Care: support provided     Problem: Bleeding (Hip Fracture)  Goal: Absence of Bleeding  Outcome: Ongoing, Progressing  Intervention: Manage Signs of Bleeding  Recent Flowsheet Documentation  Taken 2/16/2021 0833 by Fifi Duckworth RN  Bleeding Management: dressing monitored     Problem: Bowel Elimination Impaired (Hip Fracture)  Goal: Effective Bowel Elimination  Outcome: Ongoing, Progressing     Problem: Delayed Union/Nonunion (Hip Fracture)  Goal: Fracture Stability  Outcome: Ongoing, Progressing     Problem: Embolism (Hip Fracture)  Goal: Absence of Embolism  Outcome: Ongoing, Progressing  Intervention: Prevent and Manage Embolism Risk  Recent Flowsheet Documentation  Taken 2/16/2021 0833 by Fifi Duckworth RN  VTE Prevention/Management:   bilateral   dorsiflexion/plantar flexion performed     Problem: Functional Ability Impaired (Hip Fracture)  Goal: Optimal Functional Performance  Outcome: Ongoing, Progressing  Intervention: Promote Optimal Functional Status  Recent Flowsheet Documentation  Taken 2/16/2021 1220 by Fifi Duckworth RN  Activity Management: activity adjusted per tolerance  Taken 2/16/2021 1030  by Hill, Fifi, RN  Activity Management: activity adjusted per tolerance  Taken 2/16/2021 0833 by Fifi Duckworth RN  Activity Management: activity adjusted per tolerance     Problem: Pain (Hip Fracture)  Goal: Acceptable Pain Level  Outcome: Ongoing, Progressing     Problem: Urinary Elimination Impaired (Hip Fracture)  Goal: Effective Urinary Elimination  Outcome: Ongoing, Progressing     Problem: Fall Injury Risk  Goal: Absence of Fall and Fall-Related Injury  Outcome: Ongoing, Progressing  Intervention: Identify and Manage Contributors to Fall Injury Risk  Recent Flowsheet Documentation  Taken 2/16/2021 1220 by Fifi Duckworth RN  Medication Review/Management: medications reviewed  Taken 2/16/2021 0833 by Fifi Duckworth RN  Medication Review/Management: medications reviewed  Intervention: Promote Injury-Free Environment  Recent Flowsheet Documentation  Taken 2/16/2021 1220 by Fifi Duckworth RN  Safety Promotion/Fall Prevention:   activity supervised   assistive device/personal items within reach   clutter free environment maintained   nonskid shoes/slippers when out of bed  Taken 2/16/2021 1030 by Fifi Duckworth RN  Safety Promotion/Fall Prevention:   activity supervised   assistive device/personal items within reach   clutter free environment maintained   nonskid shoes/slippers when out of bed  Taken 2/16/2021 0833 by Fifi Duckworth RN  Safety Promotion/Fall Prevention:   activity supervised   assistive device/personal items within reach   clutter free environment maintained   nonskid shoes/slippers when out of bed     Problem: Pain Acute  Goal: Optimal Pain Control  Outcome: Ongoing, Progressing  Intervention: Prevent or Manage Pain  Recent Flowsheet Documentation  Taken 2/16/2021 1220 by Fifi Duckworth RN  Sensory Stimulation Regulation:   care clustered   quiet environment promoted  Sleep/Rest Enhancement:   noise level reduced   regular sleep/rest pattern promoted  Taken 2/16/2021 0833 by Fifi Duckworth RN  Sensory  Stimulation Regulation:   care clustered   quiet environment promoted  Sleep/Rest Enhancement: noise level reduced  Intervention: Optimize Psychosocial Wellbeing  Recent Flowsheet Documentation  Taken 2/16/2021 0833 by Fifi Duckworth RN  Supportive Measures:   active listening utilized   self-care encouraged  Diversional Activities: television     Problem: Skin Injury Risk Increased  Goal: Skin Health and Integrity  Outcome: Ongoing, Progressing  Intervention: Optimize Skin Protection  Recent Flowsheet Documentation  Taken 2/16/2021 1220 by Fifi Duckworth RN  Pressure Reduction Techniques: weight shift assistance provided  Head of Bed (\[facility]\""): \[facility]\"" elevated  Pressure Reduction Devices: pressure-redistributing mattress utilized  Skin Protection: incontinence pads utilized  Taken 2/16/2021 1030 by Fifi Duckworth RN  Pressure Reduction Techniques: weight shift assistance provided  Head of Bed (\[facility]\""): \[facility]\"" elevated  Pressure Reduction Devices: pressure-redistributing mattress utilized  Taken 2/16/2021 0833 by Fifi Duckworth RN  Pressure Reduction Techniques: weight shift assistance provided  Head of Bed (\[facility]\""): \[facility]\"" elevated  Pressure Reduction Devices: pressure-redistributing mattress utilized  Skin Protection: incontinence pads utilized  Intervention: Promote and Optimize Oral Intake  Recent Flowsheet Documentation  Taken 2/16/2021 0833 by Fifi Duckworth RN  Oral Nutrition Promotion: rest periods promoted   Goal Outcome Evaluation:

## 2021-02-16 NOTE — DISCHARGE SUMMARY
[facility]   DISCHARGE SUMMARY      Name:  Bossman Luna   Age:  55 y.o.  Sex:  male  :  1965  MRN:  [id number]   Visit Number:  [id number]  Primary Care Physician:  Hanna Quinones APRN  Date of Discharge:  2021  Admission Date:  2/10/2021    Discharge Diagnosis:      Closed fracture of right hip, post op day #5 Right hip hemiarthroplasty   Intellectual disability   Anxiety associated with depression   Neurogenic bladder   Hypothyroidism (acquired)   Fall    History of Present Illness/Hospital Course:    Patient is a pleasant 55-year-old male with history of intellectual developmental disability and neurogenic bladder who was brought in after being found on the floor complaining of pain.  This was an unwitnessed fall.  Upon evaluation the emergency room patient was found to have a closed fracture of the right hip.  Dr. Sagastume was consulted.  Patient underwent surgical correction on  with no postop complications noted.  He is currently awaiting hospital bed to be delivered to home prior to discharge.    The patient lives at [facility] and they have stated they do have a bed in storage but they will need to get it before he can return home.  I called the patient's mother who was okay with the plan to return to [facility] once they are ready for him.  Social work assisted in the discharge plan and I have had to cancel the discharge for today and plan it for tomorrow instead when they can have a bed ready.  In the meantime, I went ahead and sent his prescription of Lortab, Lovenox, and Tylenol.    Addendum: Patient will remain in hospital awaiting hospital bed placement. He is laughing and feeling better today asking for a pop. He denies chest pain, shortness of breath, abdominal pain. No acute events occurred overnight. His pain is controlled.       Consults:     Consults     Date and Time Order Name Status Description    2021 0819 Orthopedics  (on-call MD unless specified) Completed           Procedures Performed:    Procedure(s):  HIP HEMIARTHROPLASTY         Vital Signs:    Temp:  [97.3 °F (36.3 °C)-99.4 °F (37.4 °C)] 98 °F (36.7 °C)  Heart Rate:  [] 94  Resp:  [18] 18  BP: (128-136)/(59-88) 130/59    Physical Exam: examined again today      General Appearance:    Frail, awake and laughing. Sitting up in bed.   Head:    Normocephalic, atraumatic   Eyes:            PERRLA, EOMI   Ears:    Ears appear intact with no abnormalities noted   Throat:  clear and moist   Neck:   Supple no thyromegaly       Lungs:     Clear to auscultation,respirations regular, even and                  unlabored    Heart:    Regular rhythm and normal rate, normal S1 and S2, no            murmur, no gallop, no rub, no click   Chest Wall:    No abnormalities observed   Abdomen:    soft, nontender, nondistended   Rectal:     Deferred   Extremities:   Moves all extremities well, no edema. Right post op wound dry and well appearing   Pulses:   Pulses palpable and equal bilaterally   Skin:   No bleeding, bruising or rash   Lymph nodes:   No palpable adenopathy   Neurologic:  No tremor, sensation intact, following some commands         Pertinent Lab Results:     Results from last 7 days   Lab Units 02/13/21  0654 02/12/21  0625 02/10/21  2237   SODIUM mmol/L 135* 135* 131*   POTASSIUM mmol/L 3.5 3.7 3.4*   CHLORIDE mmol/L 97* 98 94*   CO2 mmol/L 31.5* 29.9* 29.7*   BUN mg/dL 5* 6 15   CREATININE mg/dL 0.61* 0.70* 0.78   CALCIUM mg/dL 8.2* 8.2* 8.3*   BILIRUBIN mg/dL  --   --  0.3   ALK PHOS U/L  --   --  59   ALT (SGPT) U/L  --   --  16   AST (SGOT) U/L  --   --  20   GLUCOSE mg/dL 120* 112* 105*     Results from last 7 days   Lab Units 02/15/21  0508 02/13/21  0654 02/12/21  0625   WBC 10*3/mm3 4.44 5.20 4.41   HEMOGLOBIN g/dL 8.9* 10.5* 10.8*   HEMATOCRIT % 25.7* 31.0* 32.2*   PLATELETS 10*3/mm3 165 138* 133*     Results from last 7 days   Lab Units 02/10/21  1451   INR  1.10                                  Invalid input(s): USDES,  BLOODU, NITRITITE, BACT, EP  Pain Management Panel     There is no flowsheet data to display.              Pertinent Radiology Results:    Imaging Results (All)     Procedure Component Value Units Date/Time    XR Hip With or Without Pelvis 2 - 3 View Right [[id number]] Collected: 02/13/21 0749     Updated: 02/13/21 0753    Narrative:      FRONTAL PELVIS  AND RIGHT HIP RADIOGRAPHS     HISTORY: Right hip pain.     COMPARISON: None.     FINDINGS:  A two view exam demonstrates right hip hemiarthroplasty. No  fracture. The pelvis is intact. Expected soft tissue gas and edema of  the proximal right thigh. Murphy catheter noted.           Impression:      No complication identified.     This report was finalized on 2/13/2021 7:51 AM by Dr Francisco Pardo DO.     [facility] OR Procedure [[id number]] Resulted: 02/11/21 1409     Updated: 02/11/21 1409    XR Hips Bilateral With or Without Pelvis 2 View [[id number]] Collected: 02/11/21 0747     Updated: 02/11/21 0751    Narrative:      PROCEDURE: XR HIPS BILATERAL W OR WO PELVIS 2 VIEW-     HISTORY: pain right hip after fall     COMPARISON: None.     FINDINGS: There is a right femoral neck fracture. No other fractures or  dislocations are evident. The pubic symphysis and SI joints are intact.  The soft tissues are unremarkable.       Impression:      Right femoral neck fracture.     This report was finalized on 2/11/2021 7:48 AM by Noemi Francois M.D..                 Condition on Discharge:  Stable        Code status during the hospital stay: Full code      Discharge Disposition:    Home-Health Care [facility] [place]    Discharge Medication:       Discharge Medications      New Medications      Instructions Start Date   enoxaparin 40 MG/0.4ML solution syringe  Commonly known as: LOVENOX   Inject contents of 1 syringe under the skin into the appropriate area as directed Daily for 5 days      HYDROcodone-acetaminophen 7.5-325 MG per  tablet  Commonly known as: NORCO   Take 1 tablet by mouth Every 4 (Four) Hours As Needed for Moderate Pain  or Severe Pain for up to 3 days.         Changes to Medications      Instructions Start Date   acetaminophen 500 MG tablet  Commonly known as: TYLENOL  What changed: Another medication with the same name was added. Make sure you understand how and when to take each.   1,000 mg, Oral, 3 Times Daily, 0800, 1200, 2000  Take two tablets three times daily while awake and two tablets as needed.      acetaminophen 325 MG tablet  Commonly known as: TYLENOL  What changed: You were already taking a medication with the same name, and this prescription was added. Make sure you understand how and when to take each.   650 mg, Oral, Every 6 Hours PRN      melatonin 5 MG tablet tablet  What changed: Another medication with the same name was removed. Continue taking this medication, and follow the directions you see here.   10 mg, Oral, Nightly      tamsulosin 0.4 MG capsule 24 hr capsule  Commonly known as: FLOMAX  What changed: See the new instructions.   TAKE ONE CAPSULE BY MOUTH NIGHTLY AT BEDTIME         Continue These Medications      Instructions Start Date   Ambien 5 MG tablet  Generic drug: zolpidem   10 mg, Oral, Nightly PRN      BOOST SMOOTHIE PO   1 can, Oral, 2 Times Daily PRN      Budesonide 3 MG 24 hr capsule  Commonly known as: ENTOCORT EC   9 mg, Oral, Daily      clonazePAM 0.5 MG tablet  Commonly known as: KlonoPIN   0.75 mg, Oral, 4 Times Daily, Taking 1 1/2 tablets 0800, 1100, 1400, nightly      docusate sodium 250 MG capsule  Commonly known as: COLACE   250 mg, Oral, Nightly      FLUoxetine 20 MG capsule  Commonly known as: PROzac   20 mg, Oral, Daily      fluticasone 50 MCG/ACT nasal spray  Commonly known as: FLONASE   2 sprays, Nasal, Daily      levothyroxine 88 MCG tablet  Commonly known as: SYNTHROID, LEVOTHROID   88 mcg, Oral, Daily      metoprolol succinate XL 25 MG 24 hr tablet  Commonly known as:  TOPROL-XL   25 mg, Oral, Daily      multivitamin tablet tablet  Generic drug: multivitamin   1 tablet, Oral, Daily      Oyster Shell Calcium Plus D 500-200 MG-UNIT tablet  Generic drug: Calcium Carb-Cholecalciferol   1 tablet, Oral, Daily      pantoprazole 40 MG EC tablet  Commonly known as: PROTONIX   40 mg, Oral, Nightly      risperiDONE 2 MG tablet  Commonly known as: risperDAL   2 mg, Oral, Every Morning      risperiDONE 4 MG tablet  Commonly known as: risperDAL   4 mg, Oral, Nightly         Stop These Medications    calcium carbonate (oyster shell) 500 MG tablet tablet     calcium citrate-vitamin d 200-250 MG-UNIT tablet tablet  Commonly known as: CITRACAL     losartan 25 MG tablet  Commonly known as: COZAAR     metoprolol tartrate 25 MG tablet  Commonly known as: LOPRESSOR     multivitamin with minerals tablet tablet     potassium chloride 10 MEQ CR tablet  Commonly known as: K-DUR,KLOR-CON            Discharge Diet:     Diet Instructions     Diet: Regular      Discharge Diet: Regular          Activity at Discharge:     Activity Instructions     Up WIth Assist            Follow-up Appointments:    No future appointments.  Additional Instructions for the Follow-ups that You Need to Schedule     Ambulatory Referral to Home Health   As directed      Face to Face Visit Date: 2/15/2021    Follow-up provider for Plan of Care?: I treated the patient in an acute care facility and will not continue treatment after discharge.    Follow-up provider: VERONICA BOYD [[id number]]    Reason/Clinical Findings: right hip fracture    Describe mobility limitations that make leaving home difficult: taxing effort to leave home    Nursing/Therapeutic Services Requested: Occupational Therapy Physical Therapy Skilled Nursing    Skilled nursing orders: Medication education Wound care dressing/changes    Instructions: dry dressing change daily    PT orders: Home safety assessment Strengthening Therapeutic exercise    Occupational orders:  Home safety assessment Strengthening Cognition Activities of daily living    Frequency: 1 Week 1         Discharge Follow-up with PCP   As directed       Currently Documented PCP:    Hanna Quinones APRN    PCP Phone Number:    None     Follow Up Details: 1 week         Discharge Follow-up with Specialty: Dr Sagastume; 2 Weeks   As directed      Specialty: Dr Sagastume    Follow Up: 2 Weeks               Test Results Pending at Discharge:    Pending Labs     Order Current Status    Tissue Pathology Exam In process             Maria Eugenia Álvarez DO  02/16/21  11:53 EST      Medication risks and benefits were discussed in great detail. The patient and his mother by phone reported being satisfied with the current treatment plan and the care delivered while hospitalized.     Time:  I spent 35 minutes preparing discharge counseling and teaching.

## 2021-02-17 VITALS
BODY MASS INDEX: 22.08 KG/M2 | RESPIRATION RATE: 16 BRPM | WEIGHT: 120 LBS | SYSTOLIC BLOOD PRESSURE: 112 MMHG | DIASTOLIC BLOOD PRESSURE: 59 MMHG | TEMPERATURE: 98.5 F | HEART RATE: 88 BPM | HEIGHT: 62 IN | OXYGEN SATURATION: 95 %

## 2021-02-17 PROCEDURE — 99239 HOSP IP/OBS DSCHRG MGMT >30: CPT | Performed by: FAMILY MEDICINE

## 2021-02-17 PROCEDURE — 25010000002 ENOXAPARIN PER 10 MG: Performed by: ORTHOPAEDIC SURGERY

## 2021-02-17 RX ADMIN — ENOXAPARIN SODIUM 40 MG: 40 INJECTION SUBCUTANEOUS at 10:47

## 2021-02-17 RX ADMIN — CLONAZEPAM 0.5 MG: 0.5 TABLET ORAL at 08:13

## 2021-02-17 RX ADMIN — ZOLPIDEM TARTRATE 10 MG: 5 TABLET ORAL at 02:50

## 2021-02-17 RX ADMIN — FLUTICASONE PROPIONATE 2 SPRAY: 50 SPRAY, METERED NASAL at 10:50

## 2021-02-17 RX ADMIN — BUDESONIDE 3 MG: 3 CAPSULE, GELATIN COATED ORAL at 10:49

## 2021-02-17 RX ADMIN — SODIUM CHLORIDE, PRESERVATIVE FREE 10 ML: 5 INJECTION INTRAVENOUS at 10:49

## 2021-02-17 RX ADMIN — DOCUSATE SODIUM 100 MG: 100 CAPSULE ORAL at 10:48

## 2021-02-17 RX ADMIN — RISPERIDONE 2 MG: 1 TABLET ORAL at 10:48

## 2021-02-17 RX ADMIN — POLYETHYLENE GLYCOL 3350 17 G: 17 POWDER, FOR SOLUTION ORAL at 10:50

## 2021-02-17 RX ADMIN — HYDROCODONE BITARTRATE AND ACETAMINOPHEN 1 TABLET: 7.5; 325 TABLET ORAL at 10:47

## 2021-02-17 RX ADMIN — LEVOTHYROXINE SODIUM 88 MCG: 88 TABLET ORAL at 10:49

## 2021-02-17 RX ADMIN — CLONAZEPAM 0.5 MG: 0.5 TABLET ORAL at 13:52

## 2021-02-17 RX ADMIN — METOPROLOL SUCCINATE 25 MG: 25 TABLET, EXTENDED RELEASE ORAL at 10:48

## 2021-02-17 RX ADMIN — FLUOXETINE 20 MG: 20 CAPSULE ORAL at 10:47

## 2021-02-17 NOTE — PROGRESS NOTES
Patient seen at bedside today he is status post hemiarthroplasty he is otherwise without complaint    Wound appears clean dry and intact      Impression is status post right hip hemiarthroplasty    Plan is for physical therapy weightbearing as tolerated posterior hip precautions follow-up in 2 weeks

## 2021-02-17 NOTE — PLAN OF CARE
Goal Outcome Evaluation:   Pt sleeping well after PRN sleep medication given. Will continue plan of care.

## 2021-02-17 NOTE — DISCHARGE SUMMARY
[facility]   DISCHARGE SUMMARY      Name:  Bossman Luna   Age:  55 y.o.  Sex:  male  :  1965  MRN:  [id number]   Visit Number:  [id number]  Primary Care Physician:  Hanna Quinones APRN  Date of Discharge:  2021  Admission Date:  2/10/2021    Discharge Diagnosis:      Closed fracture of right hip, post op day #6 Right hip hemiarthroplasty   Intellectual disability   Anxiety associated with depression   Neurogenic bladder   Hypothyroidism (acquired)   Fall    History of Present Illness/Hospital Course:    Patient is a pleasant 55-year-old male with history of intellectual developmental disability and neurogenic bladder who was brought in after being found on the floor complaining of pain.  This was an unwitnessed fall.  Upon evaluation the emergency room patient was found to have a closed fracture of the right hip.  Dr. Sagastume was consulted.  Patient underwent surgical correction on  with no postop complications noted.  He is currently awaiting hospital bed to be delivered to home prior to discharge.    The patient lives at [facility] and they have stated they do have a bed in storage but they will need to get it before he can return home.  I called the patient's mother who was okay with the plan to return to [facility] once they are ready for him.  Social work assisted in the discharge plan and I have had to cancel the discharge for today and plan it for tomorrow instead when they can have a bed ready.  In the meantime, I went ahead and sent his prescription of Lortab, Lovenox, and Tylenol.    Addendum: Patient will remain in hospital awaiting hospital bed placement. He is laughing and feeling better today asking for a pop. He denies chest pain, shortness of breath, abdominal pain. No acute events occurred overnight. His pain is controlled.     Addendum: The patient does have access to a hospital bed now at his home.  No acute changes have occurred in  his care.  His pain is well controlled on Lortab.  He is having regular bowel movements.  He does not appear to have any distress.  History is difficult to obtain due to intellectual disability.  Otherwise, he appears to have no chest pain, trouble breathing, abdominal pain.  He is stable and improved for transfer back to [facility] with supportive care.  Home health physical therapy will be ordered and he will remain weightbearing as tolerated with posterior hip precautions.  He will follow-up with Dr. Sagastume in 2 weeks.  Continue a daily dry dressing change to the right hip and keep the wound site clean and dry.    Consults:     Consults     Date and Time Order Name Status Description    2/11/2021 0819 Orthopedics (on-call MD unless specified) Completed           Procedures Performed:    Procedure(s):  HIP HEMIARTHROPLASTY         Vital Signs:    Temp:  [97.4 °F (36.3 °C)-99.3 °F (37.4 °C)] 98.3 °F (36.8 °C)  Heart Rate:  [76-96] 96  Resp:  [16-18] 16  BP: (110-126)/(50-72) 110/52    Physical Exam: Examined again today      General Appearance:    Frail, lying in bed.  No acute distress   Head:    Normocephalic, atraumatic   Eyes:            EOMI, PERRLA   Ears:    Ears appear intact with no abnormalities noted   Throat:  clear and moist   Neck:   Supple no thyromegaly       Lungs:    Clear bilaterally    Heart:    Regular rhythm and normal rate, normal S1 and S2, no            murmur, no gallop, no rub, no click   Chest Wall:    No abnormalities observed   Abdomen:    soft, nontender, nondistended   Rectal:     Deferred   Extremities:   Moves all extremities well, no edema. Right post op wound dry and well appearing and stable   Pulses:   Pulses palpable and equal bilaterally   Skin:   No bleeding, bruising or rash   Lymph nodes:   No palpable adenopathy   Neurologic:  No tremor, sensation intact, following some commands         Pertinent Lab Results:     Results from last 7 days   Lab Units 02/13/21  0605  02/12/21  0625 02/10/21  2237   SODIUM mmol/L 135* 135* 131*   POTASSIUM mmol/L 3.5 3.7 3.4*   CHLORIDE mmol/L 97* 98 94*   CO2 mmol/L 31.5* 29.9* 29.7*   BUN mg/dL 5* 6 15   CREATININE mg/dL 0.61* 0.70* 0.78   CALCIUM mg/dL 8.2* 8.2* 8.3*   BILIRUBIN mg/dL  --   --  0.3   ALK PHOS U/L  --   --  59   ALT (SGPT) U/L  --   --  16   AST (SGOT) U/L  --   --  20   GLUCOSE mg/dL 120* 112* 105*     Results from last 7 days   Lab Units 02/15/21  0508 02/13/21  0654 02/12/21  0625   WBC 10*3/mm3 4.44 5.20 4.41   HEMOGLOBIN g/dL 8.9* 10.5* 10.8*   HEMATOCRIT % 25.7* 31.0* 32.2*   PLATELETS 10*3/mm3 165 138* 133*     Results from last 7 days   Lab Units 02/10/21  2237   INR  1.10                               Invalid input(s): USDES,  BLOODU, NITRITITE, BACT, EP  Pain Management Panel     There is no flowsheet data to display.              Pertinent Radiology Results:    Imaging Results (All)     Procedure Component Value Units Date/Time    XR Hip With or Without Pelvis 2 - 3 View Right [[id number]] Collected: 02/13/21 0749     Updated: 02/13/21 0753    Narrative:      FRONTAL PELVIS  AND RIGHT HIP RADIOGRAPHS     HISTORY: Right hip pain.     COMPARISON: None.     FINDINGS:  A two view exam demonstrates right hip hemiarthroplasty. No  fracture. The pelvis is intact. Expected soft tissue gas and edema of  the proximal right thigh. Murphy catheter noted.           Impression:      No complication identified.     This report was finalized on 2/13/2021 7:51 AM by Dr Francisco Pardo DO.    US [facility] OR Procedure [[id number]] Resulted: 02/11/21 1409     Updated: 02/11/21 1409    XR Hips Bilateral With or Without Pelvis 2 View [[id number]] Collected: 02/11/21 0747     Updated: 02/11/21 0751    Narrative:      PROCEDURE: XR HIPS BILATERAL W OR WO PELVIS 2 VIEW-     HISTORY: pain right hip after fall     COMPARISON: None.     FINDINGS: There is a right femoral neck fracture. No other fractures or  dislocations are evident. The pubic symphysis  and SI joints are intact.  The soft tissues are unremarkable.       Impression:      Right femoral neck fracture.     This report was finalized on 2/11/2021 7:48 AM by Noemi Francois M.D..                 Condition on Discharge:  Stable        Code status during the hospital stay: Full code      Discharge Disposition:    Home-Health Care [facility]    Discharge Medication:       Discharge Medications      New Medications      Instructions Start Date   enoxaparin 40 MG/0.4ML solution syringe  Commonly known as: LOVENOX   Inject contents of 1 syringe under the skin into the appropriate area as directed Daily for 5 days      HYDROcodone-acetaminophen 7.5-325 MG per tablet  Commonly known as: NORCO   Take 1 tablet by mouth Every 4 (Four) Hours As Needed for Moderate Pain  or Severe Pain for up to 3 days.         Changes to Medications      Instructions Start Date   acetaminophen 500 MG tablet  Commonly known as: TYLENOL  What changed: Another medication with the same name was added. Make sure you understand how and when to take each.   1,000 mg, Oral, 3 Times Daily, 0800, 1200, 2000  Take two tablets three times daily while awake and two tablets as needed.      acetaminophen 325 MG tablet  Commonly known as: TYLENOL  What changed: You were already taking a medication with the same name, and this prescription was added. Make sure you understand how and when to take each.   650 mg, Oral, Every 6 Hours PRN      melatonin 5 MG tablet tablet  What changed: Another medication with the same name was removed. Continue taking this medication, and follow the directions you see here.   10 mg, Oral, Nightly      tamsulosin 0.4 MG capsule 24 hr capsule  Commonly known as: FLOMAX  What changed: See the new instructions.   TAKE ONE CAPSULE BY MOUTH NIGHTLY AT BEDTIME         Continue These Medications      Instructions Start Date   Ambien 5 MG tablet  Generic drug: zolpidem   10 mg, Oral, Nightly PRN      BOOST SMOOTHIE PO   1 can,  Oral, 2 Times Daily PRN      Budesonide 3 MG 24 hr capsule  Commonly known as: ENTOCORT EC   9 mg, Oral, Daily      clonazePAM 0.5 MG tablet  Commonly known as: KlonoPIN   0.75 mg, Oral, 4 Times Daily, Taking 1 1/2 tablets 0800, 1100, 1400, nightly      docusate sodium 250 MG capsule  Commonly known as: COLACE   250 mg, Oral, Nightly      FLUoxetine 20 MG capsule  Commonly known as: PROzac   20 mg, Oral, Daily      fluticasone 50 MCG/ACT nasal spray  Commonly known as: FLONASE   2 sprays, Nasal, Daily      levothyroxine 88 MCG tablet  Commonly known as: SYNTHROID, LEVOTHROID   88 mcg, Oral, Daily      metoprolol succinate XL 25 MG 24 hr tablet  Commonly known as: TOPROL-XL   25 mg, Oral, Daily      multivitamin tablet tablet  Generic drug: multivitamin   1 tablet, Oral, Daily      Oyster Shell Calcium Plus D 500-200 MG-UNIT tablet  Generic drug: Calcium Carb-Cholecalciferol   1 tablet, Oral, Daily      pantoprazole 40 MG EC tablet  Commonly known as: PROTONIX   40 mg, Oral, Nightly      risperiDONE 2 MG tablet  Commonly known as: risperDAL   2 mg, Oral, Every Morning      risperiDONE 4 MG tablet  Commonly known as: risperDAL   4 mg, Oral, Nightly         Stop These Medications    calcium carbonate (oyster shell) 500 MG tablet tablet     calcium citrate-vitamin d 200-250 MG-UNIT tablet tablet  Commonly known as: CITRACAL     losartan 25 MG tablet  Commonly known as: COZAAR     metoprolol tartrate 25 MG tablet  Commonly known as: LOPRESSOR     multivitamin with minerals tablet tablet     potassium chloride 10 MEQ CR tablet  Commonly known as: K-DUR,KLOR-CON            Discharge Diet:     Diet Instructions     Diet: Regular      Discharge Diet: Regular          Activity at Discharge:     Activity Instructions     Up WIth Assist            Follow-up Appointments:    No future appointments.  Additional Instructions for the Follow-ups that You Need to Schedule     Ambulatory Referral to Home Health   As directed      Face  to Face Visit Date: 2/15/2021    Follow-up provider for Plan of Care?: I treated the patient in an acute care facility and will not continue treatment after discharge.    Follow-up provider: VERONICA QUINONES [[id number]]    Reason/Clinical Findings: right hip fracture    Describe mobility limitations that make leaving home difficult: taxing effort to leave home    Nursing/Therapeutic Services Requested: Occupational Therapy Physical Therapy Skilled Nursing    Skilled nursing orders: Medication education Wound care dressing/changes    Instructions: dry dressing change daily    PT orders: Home safety assessment Strengthening Therapeutic exercise    Occupational orders: Home safety assessment Strengthening Cognition Activities of daily living    Frequency: 1 Week 1         Discharge Follow-up with PCP   As directed       Currently Documented PCP:    Veronica Quinones APRN    PCP Phone Number:    None     Follow Up Details: 1 week         Discharge Follow-up with Specialty: Dr Sagastume; 2 Weeks   As directed      Specialty: Dr Sagastume    Follow Up: 2 Weeks               Test Results Pending at Discharge:    Pending Labs     Order Current Status    Tissue Pathology Exam In process             Maria Eugenia Álvarez DO  02/17/21  12:25 EST      Medication risks and benefits were discussed in great detail. The patient and his mother by phone reported being satisfied with the current treatment plan and the care delivered while hospitalized.     Time:  I spent 35 minutes preparing discharge counseling and teaching.

## 2021-02-17 NOTE — PROGRESS NOTES
Continued Stay Note   [facility]     Patient Name: Bossman Luna  MRN: [id number]  Today's Date: 2/17/2021    Admit Date: 2/10/2021    Discharge Plan     Row Name 02/17/21 1006       Plan    Plan  Spoke to Edel Brooke they have the hospital bed and are looking foward to pt returning there they would like [facility] He will need EMS transport reviewed IMM put copy in Dc folder  she will need a Report called to her and DC Summary faxed to [phone]        Discharge Codes    No documentation.       Expected Discharge Date and Time     Expected Discharge Date Expected Discharge Time    Feb 16, 2021             Khushbu Darnell RN

## 2021-02-18 ENCOUNTER — READMISSION MANAGEMENT (OUTPATIENT)
Dept: CALL CENTER | Facility: HOSPITAL | Age: 56
End: 2021-02-18

## 2021-02-18 LAB
LAB AP CASE REPORT: NORMAL
PATH REPORT.FINAL DX SPEC: NORMAL

## 2021-02-18 NOTE — OUTREACH NOTE
Prep Survey      Responses   [facility]   Is LACE score < 7 ?  No   Emergency Room discharge w/ pulse ox?  No   Eligibility  Readm Mgmt   Discharge diagnosis  Closed fracture of right hip, post op day #6 Right hip hemiarthroplasty   Does the patient have one of the following disease processes/diagnoses(primary or secondary)?  Total Joint Replacement   Does the patient have Home health ordered?  Yes   What is the Home health agency?   [facility]     Is there a DME ordered?  Yes   What DME was ordered?  Hospital Bed    Medication alerts for this patient  Lovenox    Prep survey completed?  Yes          Willa Gibbs RN

## 2021-02-18 NOTE — PROGRESS NOTES
Case Management Discharge Note           Provided Post Acute Provider List?: Yes  Post Acute Provider List: Home Health, Nursing Home  Delivered To: Support Person  Support Person: left in room with patient  for Vasyl schneider or patient mother  Method of Delivery: In person    Selected Continued Care - Discharged on 2/17/2021 Admission date: 2/10/2021 - Discharge disposition: Home-Health Care Svc    Destination    No services have been selected for the patient.              Durable Medical Equipment Coordination complete    Service Provider Selected Services Address Phone Fax Patient Preferred    [facility] [id number] [place] [id number] [phone] --       Internal Comment last updated by Symone Wilburn, MSW 2/15/2021 1202    HB                     Dialysis/Infusion    No services have been selected for the patient.              Home Medical Care    No services have been selected for the patient.              Therapy    No services have been selected for the patient.              Community Resources    No services have been selected for the patient.                       Final Discharge Disposition Code: 06 - home with home health care

## 2021-02-23 ENCOUNTER — READMISSION MANAGEMENT (OUTPATIENT)
Dept: CALL CENTER | Facility: HOSPITAL | Age: 56
End: 2021-02-23

## 2021-02-23 NOTE — OUTREACH NOTE
Total Joint Week 1 Survey      Responses   [facility] patient discharged from?  [facility]   Does the patient have one of the following disease processes/diagnoses(primary or secondary)?  Total Joint Replacement   Joint surgery performed?  Hip   Week 1 attempt successful?  Yes   Call start time  1537   Call end time  1557   Has the patient been back in either the hospital or Emergency Department since discharge?  No   Discharge diagnosis  Closed fracture of right hip,  Right hip hemiarthroplasty   Is patient permission given to speak with other caregiver?  Yes   List who call center can speak with  Edel Brooke, Caregiver   Person spoke with today (if not patient) and relationship  Edel Brooke, Caregiver   Medication alerts for this patient  Lovenox ,  caregiver reports has completed the lovenox   Does the patient have all medications related to this admission filled (includes all antibiotics, pain medications, etc.)  Yes   Is the patient taking all medications as directed (includes completed medication regime)?  Yes   Is the patient able to teach back alternate methods of pain control?  Ice, Correct alignment   Does the patient have a follow up appointment with their surgeon?  No   Nursing Interventions  Educated patient on importance of making appointment, Advised patient to make appointment [Advised for 2 week appt with Dr Sagastume]   Has the patient kept scheduled appointments due by today?  Yes [Caregiver states patient has seen PCP since discharge. ]   What is the Home health agency?   [facility]     Has home health visited the patient within 72 hours of discharge?  No   Home health interventions  Called Home Health agency   Home health comments  [facility] reports that they did not receive orders.    What DME was ordered?  Hospital Bed    Has all DME been delivered?  No   DME interventions  Called DME agency   DME comments  Contacted [facility]. Bed is scheduled for delivery 2/25/21   Psychosocial issues?  Yes    Psychosocial comments  Resides at [facility].    Has the patient began therapy sessions (either in the home or as an out patient)?  No   Does the patient have a wound vac in place?  No   Has the patient fallen since discharge?  No   Notified Case Management  Home Health [Please refax orders to [facility]. ]   Did the patient receive a copy of their discharge instructions?  Yes   Nursing interventions  Reviewed instructions with patient [caregiver]   What is the patient's perception of their functional status since discharge?  Improving [Caregiver states patient improving, but has been having diarrhea since discharge. ]   Is the patient able to teach back signs and symptoms of infection?  Temp >100.4 for 24h or longer, Incisional drainage, Blisters around incision, Increased swelling or redness around incision (not associated with surgical edema), Severe discomfort or pain, Changes in mobility, Shortness of breath or chest pain   Is the patient able to teach back how to prevent infection?  Check incision daily, Wash hands before and after touching incision, No tub baths, hot tub or swimming, No lotion or creams [Daily dressing change to wound as ordered. ]   Is the patient able to teach back home safety measures?  Ability to shower [Caregiver states that they have helped patient with showering. ]   If the patient is a current smoker, are they able to teach back resources for cessation?  Not a smoker   Is the patient/caregiver able to teach back the hierarchy of who to call/visit for symptoms/problems? PCP, Specialist, Home health nurse, Urgent Care, ED, 911  Yes   Week 1 call completed?  Yes          Any Edwards RN

## 2021-02-24 NOTE — PROGRESS NOTES
Continued Stay Note   [facility]     Patient Name: Bossman Luna  MRN: [id number]  Today's Date: 2/24/2021    Admit Date: 2/10/2021    Discharge Plan     Row Name 02/24/21 1155       Plan    Plan  Call center follow up. Faxed  order, dc summary and h and p to [facility]. Provided another contact for caregiver for walker delivery.        Discharge Codes    No documentation.       Expected Discharge Date and Time     Expected Discharge Date Expected Discharge Time    Feb 17, 2021             Zahra Muro LCSW

## 2021-03-02 ENCOUNTER — DOCUMENTATION (OUTPATIENT)
Dept: SOCIAL WORK | Facility: HOSPITAL | Age: 56
End: 2021-03-02

## 2021-03-02 ENCOUNTER — READMISSION MANAGEMENT (OUTPATIENT)
Dept: CALL CENTER | Facility: HOSPITAL | Age: 56
End: 2021-03-02

## 2021-03-02 NOTE — PROGRESS NOTES
Contacted patient aids regarding walker delivery for patient. They state they ar contacting his caregivers for delivery.

## 2021-03-02 NOTE — OUTREACH NOTE
Total Joint Week 2 Survey      Responses   [facility] patient discharged from?  [facility]   Does the patient have one of the following disease processes/diagnoses(primary or secondary)?  Total Joint Replacement   Joint surgery performed?  Hip   Week 2 attempt successful?  Yes   Call start time  1024   Call end time  1030   Has the patient been back in either the hospital or Emergency Department since discharge?  No   Discharge diagnosis  Closed fracture of right hip,  Right hip hemiarthroplasty   Person spoke with today (if not patient) and relationship  Edel Brooke, Caregiver   Does the patient have all medications related to this admission filled (includes all antibiotics, pain medications, etc.)  Yes   Is the patient taking all medications as directed (includes completed medication regime)?  Yes   Is the patient able to teach back alternate methods of pain control?  Ice   Medication comments  PCP added Tramedol.    Does the patient have a follow up appointment with their surgeon?  Yes   Has the patient kept scheduled appointments due by today?  Yes   What is the Home health agency?   [facility]    Has home health visited the patient within 72 hours of discharge?  Yes   Home health comments  PT is beginning this week.    What DME was ordered?  Hospital Bed    DME comments  Pt did not get rolling walker at [place], he has no assistive device to help steady him walking. Advised caregiver to call surg office, but I will also contact CM.    Psychosocial issues?  Yes   Psychosocial comments  Resides at [facility].    Has the patient began therapy sessions (either in the home or as an out patient)?  No   Has the patient fallen since discharge?  No   Notified Case Management  DME   Comments  Incision is slightly red, MD thinks may be small hematoma present,they are watching it. Appetite WNL. Pain well controlled.    What is the patient's perception of their functional status since discharge?  Improving    Is the patient/caregiver able to teach back the hierarchy of who to call/visit for symptoms/problems? PCP, Specialist, Home health nurse, Urgent Care, ED, 911  Yes   Week 2 call completed?  Yes          Eleanor Lewis RN

## 2021-03-16 ENCOUNTER — READMISSION MANAGEMENT (OUTPATIENT)
Dept: CALL CENTER | Facility: HOSPITAL | Age: 56
End: 2021-03-16

## 2021-03-16 NOTE — OUTREACH NOTE
Total Joint Month 1 Survey      Responses   [facility] patient discharged from?  [facility]   Does the patient have one of the following disease processes/diagnoses(primary or secondary)?  Total Joint Replacement   Joint surgery performed?  Hip   Month 1 attempt successful?  Yes   Call start time  0957   Call end time  0959   Has the patient been back in either the hospital or Emergency Department since discharge?  No   General alerts for this patient  Resides at [facility]   Discharge diagnosis  Closed fracture of right hip, Right hip hemiarthroplasty   Person spoke with today (if not patient) and relationship  Carlene at [facility]   Is the patient taking all medications as directed (includes completed medication regime)?  Yes   Has the patient kept scheduled appointments due by today?  Yes   Is the patient still receiving Home Health Services?  Yes   DME comments  Pt received his hospital bed   Psychosocial comments  Resides at [facility].    Is the patient still attending therapy sessions(either in the home or as an outpatient)?  Yes   Has the patient fallen since discharge?  No   What is the patient's perception of their functional status since discharge?  Improving   Month 1 call completed?  Yes          Leah Limon RN

## 2021-03-28 ENCOUNTER — APPOINTMENT (OUTPATIENT)
Dept: CT IMAGING | Facility: HOSPITAL | Age: 56
End: 2021-03-28

## 2021-03-28 ENCOUNTER — HOSPITAL ENCOUNTER (OUTPATIENT)
Facility: HOSPITAL | Age: 56
Setting detail: OBSERVATION
LOS: 1 days | Discharge: HOME | End: 2021-03-30
Attending: EMERGENCY MEDICINE | Admitting: FAMILY MEDICINE

## 2021-03-28 DIAGNOSIS — N39.0 URINARY TRACT INFECTION WITHOUT HEMATURIA, SITE UNSPECIFIED: ICD-10-CM

## 2021-03-28 DIAGNOSIS — E83.42 HYPOMAGNESEMIA: ICD-10-CM

## 2021-03-28 DIAGNOSIS — A04.5 CAMPYLOBACTER DIARRHEA: Primary | ICD-10-CM

## 2021-03-28 DIAGNOSIS — A04.0 ENTEROPATHOGENIC ESCHERICHIA COLI INFECTION: ICD-10-CM

## 2021-03-28 DIAGNOSIS — E87.20 LACTIC ACIDOSIS: ICD-10-CM

## 2021-03-28 PROBLEM — E86.9 VOLUME DEPLETION: Status: ACTIVE | Noted: 2021-03-28

## 2021-03-28 LAB
ADV 40+41 DNA STL QL NAA+NON-PROBE: NOT DETECTED
ALBUMIN SERPL-MCNC: 3.1 G/DL (ref 3.5–5.2)
ALBUMIN/GLOB SERPL: 1.2 G/DL
ALP SERPL-CCNC: 72 U/L (ref 39–117)
ALT SERPL W P-5'-P-CCNC: 19 U/L (ref 1–41)
ANION GAP SERPL CALCULATED.3IONS-SCNC: 9.4 MMOL/L (ref 5–15)
AST SERPL-CCNC: 37 U/L (ref 1–40)
ASTRO TYP 1-8 RNA STL QL NAA+NON-PROBE: NOT DETECTED
BACTERIA UR QL AUTO: ABNORMAL /HPF
BASOPHILS # BLD AUTO: 0.01 10*3/MM3 (ref 0–0.2)
BASOPHILS NFR BLD AUTO: 0.1 % (ref 0–1.5)
BILIRUB SERPL-MCNC: 0.3 MG/DL (ref 0–1.2)
BILIRUB UR QL STRIP: NEGATIVE
BUN SERPL-MCNC: 12 MG/DL (ref 6–20)
BUN/CREAT SERPL: 15.6 (ref 7–25)
C CAYETANENSIS DNA STL QL NAA+NON-PROBE: NOT DETECTED
C COLI+JEJ+UPSA DNA STL QL NAA+NON-PROBE: DETECTED
CALCIUM SPEC-SCNC: 8.6 MG/DL (ref 8.6–10.5)
CHLORIDE SERPL-SCNC: 93 MMOL/L (ref 98–107)
CLARITY UR: CLEAR
CO2 SERPL-SCNC: 24.6 MMOL/L (ref 22–29)
COLOR UR: YELLOW
CREAT SERPL-MCNC: 0.77 MG/DL (ref 0.76–1.27)
CRYPTOSP DNA STL QL NAA+NON-PROBE: NOT DETECTED
D-LACTATE SERPL-SCNC: 3.3 MMOL/L (ref 0.5–2)
D-LACTATE SERPL-SCNC: 3.5 MMOL/L (ref 0.5–2)
DEPRECATED RDW RBC AUTO: 51.7 FL (ref 37–54)
E HISTOLYT DNA STL QL NAA+NON-PROBE: NOT DETECTED
EAEC PAA PLAS AGGR+AATA ST NAA+NON-PRB: NOT DETECTED
EC STX1+STX2 GENES STL QL NAA+NON-PROBE: NOT DETECTED
EOSINOPHIL # BLD AUTO: 0 10*3/MM3 (ref 0–0.4)
EOSINOPHIL NFR BLD AUTO: 0 % (ref 0.3–6.2)
EPEC EAE GENE STL QL NAA+NON-PROBE: DETECTED
ERYTHROCYTE [DISTWIDTH] IN BLOOD BY AUTOMATED COUNT: 13.4 % (ref 12.3–15.4)
ETEC LTA+ST1A+ST1B TOX ST NAA+NON-PROBE: NOT DETECTED
FLUAV AG NPH QL: NEGATIVE
FLUBV AG NPH QL IA: NEGATIVE
G LAMBLIA DNA STL QL NAA+NON-PROBE: NOT DETECTED
GFR SERPL CREATININE-BSD FRML MDRD: 105 ML/MIN/1.73
GLOBULIN UR ELPH-MCNC: 2.5 GM/DL
GLUCOSE SERPL-MCNC: 166 MG/DL (ref 65–99)
GLUCOSE UR STRIP-MCNC: NEGATIVE MG/DL
HCT VFR BLD AUTO: 34.1 % (ref 37.5–51)
HGB BLD-MCNC: 11.1 G/DL (ref 13–17.7)
HGB UR QL STRIP.AUTO: NEGATIVE
HOLD SPECIMEN: NORMAL
HOLD SPECIMEN: NORMAL
HYALINE CASTS UR QL AUTO: ABNORMAL /LPF
IMM GRANULOCYTES # BLD AUTO: 0.03 10*3/MM3 (ref 0–0.05)
IMM GRANULOCYTES NFR BLD AUTO: 0.4 % (ref 0–0.5)
KETONES UR QL STRIP: NEGATIVE
LEUKOCYTE ESTERASE UR QL STRIP.AUTO: ABNORMAL
LYMPHOCYTES # BLD AUTO: 0.27 10*3/MM3 (ref 0.7–3.1)
LYMPHOCYTES NFR BLD AUTO: 3.3 % (ref 19.6–45.3)
MAGNESIUM SERPL-MCNC: 1.3 MG/DL (ref 1.6–2.6)
MCH RBC QN AUTO: 33.6 PG (ref 26.6–33)
MCHC RBC AUTO-ENTMCNC: 32.6 G/DL (ref 31.5–35.7)
MCV RBC AUTO: 103.3 FL (ref 79–97)
MONOCYTES # BLD AUTO: 0.37 10*3/MM3 (ref 0.1–0.9)
MONOCYTES NFR BLD AUTO: 4.5 % (ref 5–12)
NEUTROPHILS NFR BLD AUTO: 7.55 10*3/MM3 (ref 1.7–7)
NEUTROPHILS NFR BLD AUTO: 91.7 % (ref 42.7–76)
NITRITE UR QL STRIP: NEGATIVE
NOROVIRUS GI+II RNA STL QL NAA+NON-PROBE: NOT DETECTED
NRBC BLD AUTO-RTO: 0 /100 WBC (ref 0–0.2)
P SHIGELLOIDES DNA STL QL NAA+NON-PROBE: NOT DETECTED
PH UR STRIP.AUTO: 5.5 [PH] (ref 5–8)
PLATELET # BLD AUTO: 142 10*3/MM3 (ref 140–450)
PMV BLD AUTO: 9.8 FL (ref 6–12)
POTASSIUM SERPL-SCNC: 3.5 MMOL/L (ref 3.5–5.2)
PROCALCITONIN SERPL-MCNC: 3.65 NG/ML (ref 0–0.25)
PROT SERPL-MCNC: 5.6 G/DL (ref 6–8.5)
PROT UR QL STRIP: NEGATIVE
RBC # BLD AUTO: 3.3 10*6/MM3 (ref 4.14–5.8)
RBC # UR: ABNORMAL /HPF
REF LAB TEST METHOD: ABNORMAL
RVA RNA STL QL NAA+NON-PROBE: NOT DETECTED
S ENT+BONG DNA STL QL NAA+NON-PROBE: NOT DETECTED
SAPO I+II+IV+V RNA STL QL NAA+NON-PROBE: NOT DETECTED
SARS-COV-2 RNA PNL SPEC NAA+PROBE: NOT DETECTED
SHIGELLA SP+EIEC IPAH ST NAA+NON-PROBE: NOT DETECTED
SODIUM SERPL-SCNC: 127 MMOL/L (ref 136–145)
SP GR UR STRIP: 1.01 (ref 1–1.03)
SQUAMOUS #/AREA URNS HPF: ABNORMAL /HPF
UROBILINOGEN UR QL STRIP: ABNORMAL
V CHOL+PARA+VUL DNA STL QL NAA+NON-PROBE: NOT DETECTED
V CHOLERAE DNA STL QL NAA+NON-PROBE: NOT DETECTED
WBC # BLD AUTO: 8.23 10*3/MM3 (ref 3.4–10.8)
WBC UR QL AUTO: ABNORMAL /HPF
WHOLE BLOOD HOLD SPECIMEN: NORMAL
WHOLE BLOOD HOLD SPECIMEN: NORMAL
Y ENTEROCOL DNA STL QL NAA+NON-PROBE: NOT DETECTED

## 2021-03-28 PROCEDURE — G0378 HOSPITAL OBSERVATION PER HR: HCPCS

## 2021-03-28 PROCEDURE — 99285 EMERGENCY DEPT VISIT HI MDM: CPT

## 2021-03-28 PROCEDURE — 0097U HC BIOFIRE FILMARRAY GI PANEL: CPT | Performed by: EMERGENCY MEDICINE

## 2021-03-28 PROCEDURE — 81001 URINALYSIS AUTO W/SCOPE: CPT | Performed by: EMERGENCY MEDICINE

## 2021-03-28 PROCEDURE — 96367 TX/PROPH/DG ADDL SEQ IV INF: CPT

## 2021-03-28 PROCEDURE — 87804 INFLUENZA ASSAY W/OPTIC: CPT | Performed by: EMERGENCY MEDICINE

## 2021-03-28 PROCEDURE — 25010000002 CEFTRIAXONE SODIUM-DEXTROSE 1-3.74 GM-%(50ML) RECONSTITUTED SOLUTION: Performed by: EMERGENCY MEDICINE

## 2021-03-28 PROCEDURE — 25010000002 IOPAMIDOL 61 % SOLUTION: Performed by: EMERGENCY MEDICINE

## 2021-03-28 PROCEDURE — 74177 CT ABD & PELVIS W/CONTRAST: CPT

## 2021-03-28 PROCEDURE — 87635 SARS-COV-2 COVID-19 AMP PRB: CPT | Performed by: EMERGENCY MEDICINE

## 2021-03-28 PROCEDURE — 25010000002 ONDANSETRON PER 1 MG: Performed by: EMERGENCY MEDICINE

## 2021-03-28 PROCEDURE — 87040 BLOOD CULTURE FOR BACTERIA: CPT | Performed by: EMERGENCY MEDICINE

## 2021-03-28 PROCEDURE — 83735 ASSAY OF MAGNESIUM: CPT | Performed by: EMERGENCY MEDICINE

## 2021-03-28 PROCEDURE — 96375 TX/PRO/DX INJ NEW DRUG ADDON: CPT

## 2021-03-28 PROCEDURE — 84145 PROCALCITONIN (PCT): CPT | Performed by: EMERGENCY MEDICINE

## 2021-03-28 PROCEDURE — 85025 COMPLETE CBC W/AUTO DIFF WBC: CPT | Performed by: EMERGENCY MEDICINE

## 2021-03-28 PROCEDURE — P9612 CATHETERIZE FOR URINE SPEC: HCPCS

## 2021-03-28 PROCEDURE — C9803 HOPD COVID-19 SPEC COLLECT: HCPCS

## 2021-03-28 PROCEDURE — 83605 ASSAY OF LACTIC ACID: CPT | Performed by: EMERGENCY MEDICINE

## 2021-03-28 PROCEDURE — 99220 PR INITIAL OBSERVATION CARE/DAY 70 MINUTES: CPT | Performed by: EMERGENCY MEDICINE

## 2021-03-28 PROCEDURE — 96365 THER/PROPH/DIAG IV INF INIT: CPT

## 2021-03-28 PROCEDURE — 80053 COMPREHEN METABOLIC PANEL: CPT | Performed by: EMERGENCY MEDICINE

## 2021-03-28 RX ORDER — CEFTRIAXONE 1 G/50ML
1 INJECTION, SOLUTION INTRAVENOUS ONCE
Status: COMPLETED | OUTPATIENT
Start: 2021-03-28 | End: 2021-03-28

## 2021-03-28 RX ORDER — SODIUM CHLORIDE 0.9 % (FLUSH) 0.9 %
10 SYRINGE (ML) INJECTION AS NEEDED
Status: DISCONTINUED | OUTPATIENT
Start: 2021-03-28 | End: 2021-03-30 | Stop reason: HOSPADM

## 2021-03-28 RX ORDER — ZOLPIDEM TARTRATE 5 MG/1
5 TABLET ORAL NIGHTLY PRN
Status: DISCONTINUED | OUTPATIENT
Start: 2021-03-28 | End: 2021-03-30 | Stop reason: HOSPADM

## 2021-03-28 RX ORDER — ONDANSETRON 2 MG/ML
4 INJECTION INTRAMUSCULAR; INTRAVENOUS EVERY 6 HOURS PRN
Status: DISCONTINUED | OUTPATIENT
Start: 2021-03-28 | End: 2021-03-30 | Stop reason: HOSPADM

## 2021-03-28 RX ORDER — ONDANSETRON 2 MG/ML
4 INJECTION INTRAMUSCULAR; INTRAVENOUS ONCE
Status: COMPLETED | OUTPATIENT
Start: 2021-03-28 | End: 2021-03-28

## 2021-03-28 RX ORDER — LEVOTHYROXINE SODIUM 88 UG/1
88 TABLET ORAL DAILY
Status: DISCONTINUED | OUTPATIENT
Start: 2021-03-29 | End: 2021-03-30 | Stop reason: HOSPADM

## 2021-03-28 RX ORDER — FLUOXETINE HYDROCHLORIDE 20 MG/1
20 CAPSULE ORAL DAILY
Status: DISCONTINUED | OUTPATIENT
Start: 2021-03-29 | End: 2021-03-30 | Stop reason: HOSPADM

## 2021-03-28 RX ORDER — SODIUM CHLORIDE 0.9 % (FLUSH) 0.9 %
10 SYRINGE (ML) INJECTION EVERY 12 HOURS SCHEDULED
Status: DISCONTINUED | OUTPATIENT
Start: 2021-03-29 | End: 2021-03-30 | Stop reason: HOSPADM

## 2021-03-28 RX ORDER — TAMSULOSIN HYDROCHLORIDE 0.4 MG/1
0.8 CAPSULE ORAL NIGHTLY
Status: DISCONTINUED | OUTPATIENT
Start: 2021-03-29 | End: 2021-03-30 | Stop reason: HOSPADM

## 2021-03-28 RX ORDER — BUDESONIDE 3 MG/1
3 CAPSULE, COATED PELLETS ORAL DAILY
Status: DISCONTINUED | OUTPATIENT
Start: 2021-03-29 | End: 2021-03-30 | Stop reason: HOSPADM

## 2021-03-28 RX ORDER — RISPERIDONE 1 MG/1
2 TABLET ORAL EVERY MORNING
Status: DISCONTINUED | OUTPATIENT
Start: 2021-03-29 | End: 2021-03-30 | Stop reason: HOSPADM

## 2021-03-28 RX ORDER — CHOLECALCIFEROL (VITAMIN D3) 125 MCG
10 CAPSULE ORAL NIGHTLY
Status: DISCONTINUED | OUTPATIENT
Start: 2021-03-29 | End: 2021-03-30 | Stop reason: HOSPADM

## 2021-03-28 RX ORDER — CLONAZEPAM 0.5 MG/1
0.75 TABLET ORAL 4 TIMES DAILY
Status: DISCONTINUED | OUTPATIENT
Start: 2021-03-29 | End: 2021-03-30 | Stop reason: HOSPADM

## 2021-03-28 RX ORDER — RISPERIDONE 1 MG/1
4 TABLET ORAL NIGHTLY
Status: DISCONTINUED | OUTPATIENT
Start: 2021-03-29 | End: 2021-03-30 | Stop reason: HOSPADM

## 2021-03-28 RX ORDER — FLUTICASONE PROPIONATE 50 MCG
2 SPRAY, SUSPENSION (ML) NASAL DAILY
Status: DISCONTINUED | OUTPATIENT
Start: 2021-03-29 | End: 2021-03-30 | Stop reason: HOSPADM

## 2021-03-28 RX ORDER — ACETAMINOPHEN 160 MG/5ML
650 SOLUTION ORAL EVERY 4 HOURS PRN
Status: DISCONTINUED | OUTPATIENT
Start: 2021-03-28 | End: 2021-03-30 | Stop reason: HOSPADM

## 2021-03-28 RX ORDER — ACETAMINOPHEN 325 MG/1
650 TABLET ORAL EVERY 4 HOURS PRN
Status: DISCONTINUED | OUTPATIENT
Start: 2021-03-28 | End: 2021-03-30 | Stop reason: HOSPADM

## 2021-03-28 RX ORDER — ACETAMINOPHEN 650 MG/1
650 SUPPOSITORY RECTAL EVERY 4 HOURS PRN
Status: DISCONTINUED | OUTPATIENT
Start: 2021-03-28 | End: 2021-03-30 | Stop reason: HOSPADM

## 2021-03-28 RX ORDER — METOPROLOL SUCCINATE 25 MG/1
25 TABLET, EXTENDED RELEASE ORAL DAILY
Status: DISCONTINUED | OUTPATIENT
Start: 2021-03-29 | End: 2021-03-30 | Stop reason: HOSPADM

## 2021-03-28 RX ORDER — AZITHROMYCIN 250 MG/1
500 TABLET, FILM COATED ORAL ONCE
Status: COMPLETED | OUTPATIENT
Start: 2021-03-29 | End: 2021-03-29

## 2021-03-28 RX ORDER — PANTOPRAZOLE SODIUM 40 MG/1
40 TABLET, DELAYED RELEASE ORAL NIGHTLY
Status: DISCONTINUED | OUTPATIENT
Start: 2021-03-29 | End: 2021-03-30 | Stop reason: HOSPADM

## 2021-03-28 RX ADMIN — METRONIDAZOLE 500 MG: 500 INJECTION, SOLUTION INTRAVENOUS at 17:52

## 2021-03-28 RX ADMIN — SODIUM CHLORIDE 1000 ML: 9 INJECTION, SOLUTION INTRAVENOUS at 16:42

## 2021-03-28 RX ADMIN — SODIUM CHLORIDE 1000 ML: 9 INJECTION, SOLUTION INTRAVENOUS at 20:36

## 2021-03-28 RX ADMIN — IOPAMIDOL 75 ML: 612 INJECTION, SOLUTION INTRAVENOUS at 17:51

## 2021-03-28 RX ADMIN — ONDANSETRON 4 MG: 2 INJECTION INTRAMUSCULAR; INTRAVENOUS at 16:43

## 2021-03-28 RX ADMIN — CEFTRIAXONE 1 G: 1 INJECTION, SOLUTION INTRAVENOUS at 19:25

## 2021-03-29 ENCOUNTER — READMISSION MANAGEMENT (OUTPATIENT)
Dept: CALL CENTER | Facility: HOSPITAL | Age: 56
End: 2021-03-29

## 2021-03-29 LAB
ANION GAP SERPL CALCULATED.3IONS-SCNC: 5.5 MMOL/L (ref 5–15)
BASOPHILS # BLD AUTO: 0.01 10*3/MM3 (ref 0–0.2)
BASOPHILS NFR BLD AUTO: 0.2 % (ref 0–1.5)
BUN SERPL-MCNC: 7 MG/DL (ref 6–20)
BUN/CREAT SERPL: 14.9 (ref 7–25)
CALCIUM SPEC-SCNC: 8.5 MG/DL (ref 8.6–10.5)
CHLORIDE SERPL-SCNC: 108 MMOL/L (ref 98–107)
CO2 SERPL-SCNC: 27.5 MMOL/L (ref 22–29)
CREAT SERPL-MCNC: 0.47 MG/DL (ref 0.76–1.27)
DEPRECATED RDW RBC AUTO: 52.5 FL (ref 37–54)
EOSINOPHIL # BLD AUTO: 0.01 10*3/MM3 (ref 0–0.4)
EOSINOPHIL NFR BLD AUTO: 0.2 % (ref 0.3–6.2)
ERYTHROCYTE [DISTWIDTH] IN BLOOD BY AUTOMATED COUNT: 13.7 % (ref 12.3–15.4)
GFR SERPL CREATININE-BSD FRML MDRD: >150 ML/MIN/1.73
GLUCOSE SERPL-MCNC: 77 MG/DL (ref 65–99)
HCT VFR BLD AUTO: 33.9 % (ref 37.5–51)
HGB BLD-MCNC: 11.1 G/DL (ref 13–17.7)
IMM GRANULOCYTES # BLD AUTO: 0.02 10*3/MM3 (ref 0–0.05)
IMM GRANULOCYTES NFR BLD AUTO: 0.4 % (ref 0–0.5)
LYMPHOCYTES # BLD AUTO: 0.44 10*3/MM3 (ref 0.7–3.1)
LYMPHOCYTES NFR BLD AUTO: 8.3 % (ref 19.6–45.3)
MCH RBC QN AUTO: 33.7 PG (ref 26.6–33)
MCHC RBC AUTO-ENTMCNC: 32.7 G/DL (ref 31.5–35.7)
MCV RBC AUTO: 103 FL (ref 79–97)
MONOCYTES # BLD AUTO: 0.49 10*3/MM3 (ref 0.1–0.9)
MONOCYTES NFR BLD AUTO: 9.3 % (ref 5–12)
NEUTROPHILS NFR BLD AUTO: 4.3 10*3/MM3 (ref 1.7–7)
NEUTROPHILS NFR BLD AUTO: 81.6 % (ref 42.7–76)
NRBC BLD AUTO-RTO: 0 /100 WBC (ref 0–0.2)
PLATELET # BLD AUTO: 143 10*3/MM3 (ref 140–450)
PMV BLD AUTO: 10.2 FL (ref 6–12)
POTASSIUM SERPL-SCNC: 3.8 MMOL/L (ref 3.5–5.2)
RBC # BLD AUTO: 3.29 10*6/MM3 (ref 4.14–5.8)
RBC MORPH BLD: NORMAL
SMALL PLATELETS BLD QL SMEAR: ADEQUATE
SODIUM SERPL-SCNC: 141 MMOL/L (ref 136–145)
WBC # BLD AUTO: 5.27 10*3/MM3 (ref 3.4–10.8)
WBC MORPH BLD: NORMAL

## 2021-03-29 PROCEDURE — 85025 COMPLETE CBC W/AUTO DIFF WBC: CPT | Performed by: EMERGENCY MEDICINE

## 2021-03-29 PROCEDURE — 99225 PR SBSQ OBSERVATION CARE/DAY 25 MINUTES: CPT | Performed by: NURSE PRACTITIONER

## 2021-03-29 PROCEDURE — 25010000002 ENOXAPARIN PER 10 MG: Performed by: EMERGENCY MEDICINE

## 2021-03-29 PROCEDURE — 85007 BL SMEAR W/DIFF WBC COUNT: CPT | Performed by: EMERGENCY MEDICINE

## 2021-03-29 PROCEDURE — 80048 BASIC METABOLIC PNL TOTAL CA: CPT | Performed by: EMERGENCY MEDICINE

## 2021-03-29 PROCEDURE — G0378 HOSPITAL OBSERVATION PER HR: HCPCS

## 2021-03-29 PROCEDURE — 51798 US URINE CAPACITY MEASURE: CPT

## 2021-03-29 PROCEDURE — 96372 THER/PROPH/DIAG INJ SC/IM: CPT

## 2021-03-29 RX ORDER — POTASSIUM CHLORIDE 750 MG/1
10 TABLET, EXTENDED RELEASE ORAL DAILY
COMMUNITY

## 2021-03-29 RX ORDER — ESOMEPRAZOLE MAGNESIUM 40 MG/1
40 CAPSULE, DELAYED RELEASE ORAL
COMMUNITY

## 2021-03-29 RX ORDER — LOSARTAN POTASSIUM 25 MG/1
25 TABLET ORAL NIGHTLY
COMMUNITY

## 2021-03-29 RX ORDER — RISPERIDONE 3 MG/1
3 TABLET ORAL NIGHTLY
COMMUNITY

## 2021-03-29 RX ORDER — LANOLIN ALCOHOL/MO/W.PET/CERES
6 CREAM (GRAM) TOPICAL NIGHTLY
COMMUNITY

## 2021-03-29 RX ADMIN — CLONAZEPAM 0.75 MG: 0.5 TABLET ORAL at 18:26

## 2021-03-29 RX ADMIN — FLUTICASONE PROPIONATE 2 SPRAY: 50 SPRAY, METERED NASAL at 10:45

## 2021-03-29 RX ADMIN — Medication 10 MG: at 21:35

## 2021-03-29 RX ADMIN — RISPERIDONE 4 MG: 1 TABLET ORAL at 21:35

## 2021-03-29 RX ADMIN — Medication 10 ML: at 10:46

## 2021-03-29 RX ADMIN — BUDESONIDE 3 MG: 3 CAPSULE, GELATIN COATED ORAL at 08:19

## 2021-03-29 RX ADMIN — TAMSULOSIN HYDROCHLORIDE 0.8 MG: 0.4 CAPSULE ORAL at 21:35

## 2021-03-29 RX ADMIN — CLONAZEPAM 0.75 MG: 0.5 TABLET ORAL at 12:26

## 2021-03-29 RX ADMIN — LEVOTHYROXINE SODIUM 88 MCG: 88 TABLET ORAL at 05:43

## 2021-03-29 RX ADMIN — MAGNESIUM GLUCONATE 500 MG ORAL TABLET 800 MG: 500 TABLET ORAL at 21:35

## 2021-03-29 RX ADMIN — MAGNESIUM GLUCONATE 500 MG ORAL TABLET 800 MG: 500 TABLET ORAL at 08:19

## 2021-03-29 RX ADMIN — PANTOPRAZOLE SODIUM 40 MG: 40 TABLET, DELAYED RELEASE ORAL at 00:05

## 2021-03-29 RX ADMIN — CLONAZEPAM 0.75 MG: 0.5 TABLET ORAL at 00:04

## 2021-03-29 RX ADMIN — AZITHROMYCIN MONOHYDRATE 500 MG: 250 TABLET ORAL at 00:05

## 2021-03-29 RX ADMIN — ENOXAPARIN SODIUM 40 MG: 40 INJECTION SUBCUTANEOUS at 05:43

## 2021-03-29 RX ADMIN — METOPROLOL SUCCINATE 25 MG: 25 TABLET, EXTENDED RELEASE ORAL at 08:19

## 2021-03-29 RX ADMIN — CLONAZEPAM 0.75 MG: 0.5 TABLET ORAL at 21:35

## 2021-03-29 RX ADMIN — Medication 10 MG: at 00:05

## 2021-03-29 RX ADMIN — FLUOXETINE 20 MG: 20 CAPSULE ORAL at 08:19

## 2021-03-29 RX ADMIN — TAMSULOSIN HYDROCHLORIDE 0.8 MG: 0.4 CAPSULE ORAL at 00:05

## 2021-03-29 RX ADMIN — RISPERIDONE 2 MG: 1 TABLET ORAL at 06:15

## 2021-03-29 RX ADMIN — CLONAZEPAM 0.75 MG: 0.5 TABLET ORAL at 08:20

## 2021-03-29 RX ADMIN — ZOLPIDEM TARTRATE 5 MG: 5 TABLET ORAL at 00:05

## 2021-03-29 RX ADMIN — PANTOPRAZOLE SODIUM 40 MG: 40 TABLET, DELAYED RELEASE ORAL at 21:35

## 2021-03-29 RX ADMIN — RISPERIDONE 4 MG: 1 TABLET ORAL at 00:04

## 2021-03-29 RX ADMIN — MAGNESIUM GLUCONATE 500 MG ORAL TABLET 800 MG: 500 TABLET ORAL at 00:05

## 2021-03-29 NOTE — OUTREACH NOTE
Total Joint Month 2 Survey      Responses   [facility] patient discharged from?  [facility]   Does the patient have one of the following disease processes/diagnoses(primary or secondary)?  Total Joint Replacement   Joint surgery performed?  Hip   Month 2 attempt successful?  No   Revoke  Readmitted          Alida Vang RN

## 2021-03-30 VITALS
HEART RATE: 82 BPM | TEMPERATURE: 98.7 F | DIASTOLIC BLOOD PRESSURE: 60 MMHG | SYSTOLIC BLOOD PRESSURE: 112 MMHG | RESPIRATION RATE: 18 BRPM | HEIGHT: 62 IN | OXYGEN SATURATION: 92 % | BODY MASS INDEX: 22.27 KG/M2 | WEIGHT: 121.03 LBS

## 2021-03-30 LAB
ALBUMIN SERPL-MCNC: 3 G/DL (ref 3.5–5.2)
ALBUMIN/GLOB SERPL: 1 G/DL
ALP SERPL-CCNC: 65 U/L (ref 39–117)
ALT SERPL W P-5'-P-CCNC: 33 U/L (ref 1–41)
ANION GAP SERPL CALCULATED.3IONS-SCNC: 6.7 MMOL/L (ref 5–15)
AST SERPL-CCNC: 34 U/L (ref 1–40)
BASOPHILS # BLD AUTO: 0.02 10*3/MM3 (ref 0–0.2)
BASOPHILS NFR BLD AUTO: 0.3 % (ref 0–1.5)
BILIRUB SERPL-MCNC: <0.2 MG/DL (ref 0–1.2)
BUN SERPL-MCNC: 7 MG/DL (ref 6–20)
BUN/CREAT SERPL: 12.1 (ref 7–25)
CALCIUM SPEC-SCNC: 8.6 MG/DL (ref 8.6–10.5)
CHLORIDE SERPL-SCNC: 103 MMOL/L (ref 98–107)
CO2 SERPL-SCNC: 28.3 MMOL/L (ref 22–29)
CREAT SERPL-MCNC: 0.58 MG/DL (ref 0.76–1.27)
DEPRECATED RDW RBC AUTO: 54 FL (ref 37–54)
EOSINOPHIL # BLD AUTO: 0.11 10*3/MM3 (ref 0–0.4)
EOSINOPHIL NFR BLD AUTO: 1.8 % (ref 0.3–6.2)
ERYTHROCYTE [DISTWIDTH] IN BLOOD BY AUTOMATED COUNT: 13.9 % (ref 12.3–15.4)
GFR SERPL CREATININE-BSD FRML MDRD: 146 ML/MIN/1.73
GLOBULIN UR ELPH-MCNC: 3 GM/DL
GLUCOSE SERPL-MCNC: 85 MG/DL (ref 65–99)
HCT VFR BLD AUTO: 34.9 % (ref 37.5–51)
HGB BLD-MCNC: 11.3 G/DL (ref 13–17.7)
IMM GRANULOCYTES # BLD AUTO: 0.02 10*3/MM3 (ref 0–0.05)
IMM GRANULOCYTES NFR BLD AUTO: 0.3 % (ref 0–0.5)
LYMPHOCYTES # BLD AUTO: 1.23 10*3/MM3 (ref 0.7–3.1)
LYMPHOCYTES NFR BLD AUTO: 19.7 % (ref 19.6–45.3)
MCH RBC QN AUTO: 33.8 PG (ref 26.6–33)
MCHC RBC AUTO-ENTMCNC: 32.4 G/DL (ref 31.5–35.7)
MCV RBC AUTO: 104.5 FL (ref 79–97)
MONOCYTES # BLD AUTO: 0.78 10*3/MM3 (ref 0.1–0.9)
MONOCYTES NFR BLD AUTO: 12.5 % (ref 5–12)
NEUTROPHILS NFR BLD AUTO: 4.08 10*3/MM3 (ref 1.7–7)
NEUTROPHILS NFR BLD AUTO: 65.4 % (ref 42.7–76)
NRBC BLD AUTO-RTO: 0 /100 WBC (ref 0–0.2)
PLATELET # BLD AUTO: 173 10*3/MM3 (ref 140–450)
PMV BLD AUTO: 10.3 FL (ref 6–12)
POTASSIUM SERPL-SCNC: 3.8 MMOL/L (ref 3.5–5.2)
PROT SERPL-MCNC: 6 G/DL (ref 6–8.5)
RBC # BLD AUTO: 3.34 10*6/MM3 (ref 4.14–5.8)
SODIUM SERPL-SCNC: 138 MMOL/L (ref 136–145)
WBC # BLD AUTO: 6.24 10*3/MM3 (ref 3.4–10.8)

## 2021-03-30 PROCEDURE — 80053 COMPREHEN METABOLIC PANEL: CPT | Performed by: NURSE PRACTITIONER

## 2021-03-30 PROCEDURE — 25010000002 ENOXAPARIN PER 10 MG: Performed by: EMERGENCY MEDICINE

## 2021-03-30 PROCEDURE — 96372 THER/PROPH/DIAG INJ SC/IM: CPT

## 2021-03-30 PROCEDURE — 85025 COMPLETE CBC W/AUTO DIFF WBC: CPT | Performed by: NURSE PRACTITIONER

## 2021-03-30 PROCEDURE — G0378 HOSPITAL OBSERVATION PER HR: HCPCS

## 2021-03-30 PROCEDURE — 99217 PR OBSERVATION CARE DISCHARGE MANAGEMENT: CPT | Performed by: NURSE PRACTITIONER

## 2021-03-30 PROCEDURE — 51798 US URINE CAPACITY MEASURE: CPT

## 2021-03-30 RX ORDER — AZITHROMYCIN 250 MG/1
500 TABLET, FILM COATED ORAL ONCE
Status: COMPLETED | OUTPATIENT
Start: 2021-03-30 | End: 2021-03-30

## 2021-03-30 RX ORDER — AZITHROMYCIN 500 MG/1
500 TABLET, FILM COATED ORAL DAILY
Qty: 3 TABLET | Refills: 0 | Status: SHIPPED | OUTPATIENT
Start: 2021-03-30 | End: 2021-04-02

## 2021-03-30 RX ADMIN — LEVOTHYROXINE SODIUM 88 MCG: 88 TABLET ORAL at 06:22

## 2021-03-30 RX ADMIN — METOPROLOL SUCCINATE 25 MG: 25 TABLET, EXTENDED RELEASE ORAL at 09:46

## 2021-03-30 RX ADMIN — FLUTICASONE PROPIONATE 2 SPRAY: 50 SPRAY, METERED NASAL at 09:53

## 2021-03-30 RX ADMIN — MAGNESIUM GLUCONATE 500 MG ORAL TABLET 800 MG: 500 TABLET ORAL at 09:46

## 2021-03-30 RX ADMIN — RISPERIDONE 2 MG: 1 TABLET ORAL at 06:22

## 2021-03-30 RX ADMIN — CLONAZEPAM 0.75 MG: 0.5 TABLET ORAL at 09:45

## 2021-03-30 RX ADMIN — BUDESONIDE 3 MG: 3 CAPSULE, GELATIN COATED ORAL at 09:46

## 2021-03-30 RX ADMIN — FLUOXETINE 20 MG: 20 CAPSULE ORAL at 09:53

## 2021-03-30 RX ADMIN — ENOXAPARIN SODIUM 40 MG: 40 INJECTION SUBCUTANEOUS at 06:22

## 2021-03-30 RX ADMIN — AZITHROMYCIN MONOHYDRATE 500 MG: 250 TABLET ORAL at 14:09

## 2021-04-02 LAB
BACTERIA SPEC AEROBE CULT: NORMAL
BACTERIA SPEC AEROBE CULT: NORMAL

## 2021-04-12 ENCOUNTER — LAB REQUISITION (OUTPATIENT)
Dept: LAB | Facility: HOSPITAL | Age: 56
End: 2021-04-12

## 2021-04-12 DIAGNOSIS — R00.0 TACHYCARDIA, UNSPECIFIED: ICD-10-CM

## 2021-04-12 LAB
BACTERIA UR QL AUTO: ABNORMAL /HPF
BILIRUB UR QL STRIP: NEGATIVE
CLARITY UR: ABNORMAL
COLOR UR: YELLOW
GLUCOSE UR STRIP-MCNC: NEGATIVE MG/DL
HGB UR QL STRIP.AUTO: ABNORMAL
HYALINE CASTS UR QL AUTO: ABNORMAL /LPF
KETONES UR QL STRIP: NEGATIVE
LEUKOCYTE ESTERASE UR QL STRIP.AUTO: ABNORMAL
NITRITE UR QL STRIP: NEGATIVE
PH UR STRIP.AUTO: 6.5 [PH] (ref 5–8)
PROT UR QL STRIP: ABNORMAL
RBC # UR: ABNORMAL /HPF
REF LAB TEST METHOD: ABNORMAL
SP GR UR STRIP: 1.01 (ref 1–1.03)
SQUAMOUS #/AREA URNS HPF: ABNORMAL /HPF
UROBILINOGEN UR QL STRIP: ABNORMAL
WBC UR QL AUTO: ABNORMAL /HPF

## 2021-04-12 PROCEDURE — 87077 CULTURE AEROBIC IDENTIFY: CPT | Performed by: NURSE PRACTITIONER

## 2021-04-12 PROCEDURE — 87186 SC STD MICRODIL/AGAR DIL: CPT | Performed by: NURSE PRACTITIONER

## 2021-04-12 PROCEDURE — 81001 URINALYSIS AUTO W/SCOPE: CPT | Performed by: NURSE PRACTITIONER

## 2021-04-12 PROCEDURE — 87086 URINE CULTURE/COLONY COUNT: CPT | Performed by: NURSE PRACTITIONER

## 2021-04-15 LAB — BACTERIA SPEC AEROBE CULT: ABNORMAL

## 2021-06-17 ENCOUNTER — LAB REQUISITION (OUTPATIENT)
Dept: LAB | Facility: HOSPITAL | Age: 56
End: 2021-06-17

## 2021-06-17 DIAGNOSIS — R31.9 HEMATURIA, UNSPECIFIED: ICD-10-CM

## 2021-06-17 LAB
BACTERIA UR QL AUTO: ABNORMAL /HPF
BILIRUB UR QL STRIP: NEGATIVE
CLARITY UR: ABNORMAL
COLOR UR: ABNORMAL
GLUCOSE UR STRIP-MCNC: NEGATIVE MG/DL
HGB UR QL STRIP.AUTO: ABNORMAL
HYALINE CASTS UR QL AUTO: ABNORMAL /LPF
KETONES UR QL STRIP: ABNORMAL
LEUKOCYTE ESTERASE UR QL STRIP.AUTO: ABNORMAL
NITRITE UR QL STRIP: NEGATIVE
PH UR STRIP.AUTO: <=5 [PH] (ref 5–8)
PROT UR QL STRIP: ABNORMAL
RBC # UR: ABNORMAL /HPF
REF LAB TEST METHOD: ABNORMAL
SP GR UR STRIP: 1.02 (ref 1–1.03)
SQUAMOUS #/AREA URNS HPF: ABNORMAL /HPF
UROBILINOGEN UR QL STRIP: ABNORMAL
WBC UR QL AUTO: ABNORMAL /HPF

## 2021-06-17 PROCEDURE — 87077 CULTURE AEROBIC IDENTIFY: CPT | Performed by: NURSE PRACTITIONER

## 2021-06-17 PROCEDURE — 87186 SC STD MICRODIL/AGAR DIL: CPT | Performed by: NURSE PRACTITIONER

## 2021-06-17 PROCEDURE — 87147 CULTURE TYPE IMMUNOLOGIC: CPT | Performed by: NURSE PRACTITIONER

## 2021-06-17 PROCEDURE — 87086 URINE CULTURE/COLONY COUNT: CPT | Performed by: NURSE PRACTITIONER

## 2021-06-17 PROCEDURE — 81001 URINALYSIS AUTO W/SCOPE: CPT | Performed by: NURSE PRACTITIONER

## 2021-06-19 LAB — BACTERIA SPEC AEROBE CULT: ABNORMAL

## 2021-08-05 ENCOUNTER — LAB REQUISITION (OUTPATIENT)
Dept: LAB | Facility: HOSPITAL | Age: 56
End: 2021-08-05

## 2021-08-05 DIAGNOSIS — N39.0 URINARY TRACT INFECTION, SITE NOT SPECIFIED: ICD-10-CM

## 2021-08-05 LAB
BACTERIA UR QL AUTO: ABNORMAL /HPF
BILIRUB UR QL STRIP: NEGATIVE
CLARITY UR: ABNORMAL
COLOR UR: YELLOW
GLUCOSE UR STRIP-MCNC: NEGATIVE MG/DL
HGB UR QL STRIP.AUTO: ABNORMAL
HYALINE CASTS UR QL AUTO: ABNORMAL /LPF
KETONES UR QL STRIP: NEGATIVE
LEUKOCYTE ESTERASE UR QL STRIP.AUTO: ABNORMAL
NITRITE UR QL STRIP: NEGATIVE
PH UR STRIP.AUTO: 6 [PH] (ref 5–8)
PROT UR QL STRIP: ABNORMAL
RBC # UR: ABNORMAL /HPF
REF LAB TEST METHOD: ABNORMAL
SP GR UR STRIP: 1.01 (ref 1–1.03)
SQUAMOUS #/AREA URNS HPF: ABNORMAL /HPF
UROBILINOGEN UR QL STRIP: ABNORMAL
WBC UR QL AUTO: ABNORMAL /HPF

## 2021-08-05 PROCEDURE — 87186 SC STD MICRODIL/AGAR DIL: CPT | Performed by: NURSE PRACTITIONER

## 2021-08-05 PROCEDURE — 81001 URINALYSIS AUTO W/SCOPE: CPT | Performed by: NURSE PRACTITIONER

## 2021-08-05 PROCEDURE — 87077 CULTURE AEROBIC IDENTIFY: CPT | Performed by: NURSE PRACTITIONER

## 2021-08-05 PROCEDURE — 87086 URINE CULTURE/COLONY COUNT: CPT | Performed by: NURSE PRACTITIONER

## 2021-08-07 LAB — BACTERIA SPEC AEROBE CULT: ABNORMAL

## 2022-01-01 NOTE — PROGRESS NOTES
"Adult Nutrition  Assessment/PES    Patient Name:  Bossman Luna  YOB: 1965  MRN: [id number]  Admit Date:  2/10/2021    Assessment Date:  2/17/2021    Comments:    Recommend:  1. Continue current diet order as medically appropriate and tolerated.  2. Encourage PO intake. Average intake ~72% x 9 meals.   3. Consider a multivitamin with minerals daily.  4. Continue to monitor and replace electrolytes PRN.      RD to follow pt and available PRN.      Reason for Assessment     Row Name 02/17/21 1146          Reason for Assessment    Reason For Assessment  per organizational policy;other (see comments) 7 day admit; no assessment     Diagnosis  trauma;neurologic conditions broken leg, intellectual disability           Anthropometrics     Row Name 02/17/21 1147          Anthropometrics    Height  157.5 cm (62\")        Ideal Body Weight (IBW)    Ideal Body Weight (IBW) (kg)  54.13         Labs/Tests/Procedures/Meds     Row Name 02/17/21 1147          Labs/Procedures/Meds    Lab Results Reviewed  reviewed, pertinent     Lab Results Comments  Low: Na, Cl, BUN, Cr; High: Gluc        Medications    Pertinent Medications Reviewed  reviewed, pertinent     Pertinent Medications Comments  Coalce, Protonix, Miralax         Physical Findings     Row Name 02/17/21 1147          Physical Findings    Skin  surgical incision Right hip         Estimated/Assessed Needs     Row Name 02/17/21 1147          Calculation Measurements    Weight Used For Calculations  54.4 kg (119 lb 14.9 oz) actual BW     Height  157.5 cm (62\")        Estimated/Assessed Needs    Additional Documentation  Fluid Requirements (Group);[place]-St. Jeor Equation (Group);Protein Requirements (Group);Calorie Requirements (Group)        Calorie Requirements    Estimated Calorie Need Method  [place]-St Jeor     Estimated Calorie Requirement Comment  0102-2442        [place]-St. Jeor Equation    RMR ([place]-St. Jeor Equation)  1263.25     " [facility] [place] Activity Factors  -- 1.3 AF        Protein Requirements    Weight Used For Protein Calculations  54.4 kg (119 lb 14.9 oz) actual BW     Est Protein Requirement Amount (gms/kg)  1.2 gm protein 54-65 grams     Estimated Protein Requirements (gms/day)  65.28        Fluid Requirements    Estimated Fluid Requirement Method  [place]-Segar Formula     [place]-Segar Method (over 20 kg)  2588         Nutrition Prescription Ordered     Row Name 02/17/21 1148          Nutrition Prescription PO    Current PO Diet  Regular;Other (comment) [employer]         Evaluation of Received Nutrient/Fluid Intake     Row Name 02/17/21 1147          PO Evaluation    Number of Days PO Intake Evaluated  3 days     Number of Meals  9     % PO Intake  72               Problem/Interventions:  Problem 1     Row Name 02/17/21 1149          Nutrition Diagnoses Problem 1    Problem 1  Altered Nutrition Related to Labs     Etiology (related to)  Medical Diagnosis     Endocrine  Hyperglycemia     Signs/Symptoms (evidenced by)  Biochemical     Specific Labs Noted  Glucose               Intervention Goal     Row Name 02/17/21 1149          Intervention Goal    General  Meet nutritional needs for age/condition;Improved nutrition related lab(s)     PO  Meet estimated needs;Increase intake;PO intake (%)     PO Intake %  75 %     Weight  Maintain weight         Nutrition Intervention     Row Name 02/17/21 1149          Nutrition Intervention    RD/Tech Action  Follow Tx progress;Encourage intake         Nutrition Prescription     Row Name 02/17/21 1149          Nutrition Prescription PO    PO Prescription  Other (comment) Continue diet as medically appropriate and tolerated     New PO Prescription Ordered?  No, recommended        Other Orders    Obtain Weight  Daily     Obtain Weight Ordered?  No, recommended     Supplement  Vitamin mineral supplement     Supplement Ordered?  No, recommended     Other  Continue to monitor and replace  electrolytes PRN         Education/Evaluation     Row Name 02/17/21 8808          Education    Education  Education not appropriate at this time     Please explain  Patient confusion        Monitor/Evaluation    Monitor  PO intake;I&O;Per protocol;Pertinent labs;Weight;Skin status           Electronically signed by:  Vanessa Thurman RD  02/17/21 11:50 EST   4

## 2023-05-15 ENCOUNTER — TRANSCRIBE ORDERS (OUTPATIENT)
Dept: LAB | Facility: HOSPITAL | Age: 58
End: 2023-05-15
Payer: MEDICARE

## 2023-05-15 ENCOUNTER — LAB (OUTPATIENT)
Dept: LAB | Facility: HOSPITAL | Age: 58
End: 2023-05-15
Payer: MEDICARE

## 2023-05-15 DIAGNOSIS — E78.00 PURE HYPERCHOLESTEROLEMIA: Primary | ICD-10-CM

## 2023-05-15 DIAGNOSIS — E78.00 PURE HYPERCHOLESTEROLEMIA: ICD-10-CM

## 2023-05-15 PROCEDURE — 36415 COLL VENOUS BLD VENIPUNCTURE: CPT

## 2024-08-16 ENCOUNTER — APPOINTMENT (OUTPATIENT)
Dept: GENERAL RADIOLOGY | Facility: HOSPITAL | Age: 59
DRG: 194 | End: 2024-08-16
Payer: MEDICARE

## 2024-08-16 ENCOUNTER — HOSPITAL ENCOUNTER (INPATIENT)
Facility: HOSPITAL | Age: 59
LOS: 2 days | Discharge: HOME | DRG: 194 | End: 2024-08-18
Attending: EMERGENCY MEDICINE | Admitting: INTERNAL MEDICINE
Payer: MEDICARE

## 2024-08-16 DIAGNOSIS — R79.89 ELEVATED TROPONIN: ICD-10-CM

## 2024-08-16 DIAGNOSIS — J18.9 PNEUMONIA OF BOTH LUNGS DUE TO INFECTIOUS ORGANISM, UNSPECIFIED PART OF LUNG: Primary | ICD-10-CM

## 2024-08-16 PROBLEM — J15.9 BACTERIAL PNEUMONIA: Status: ACTIVE | Noted: 2024-08-16

## 2024-08-16 PROBLEM — U07.1 COVID-19 VIRUS INFECTION: Status: ACTIVE | Noted: 2024-08-16

## 2024-08-16 LAB
ALBUMIN SERPL-MCNC: 3.5 G/DL (ref 3.5–5.2)
ALBUMIN/GLOB SERPL: 1.1 G/DL
ALP SERPL-CCNC: 53 U/L (ref 39–117)
ALT SERPL W P-5'-P-CCNC: 22 U/L (ref 1–41)
ANION GAP SERPL CALCULATED.3IONS-SCNC: 11.6 MMOL/L (ref 5–15)
AST SERPL-CCNC: 33 U/L (ref 1–40)
BASOPHILS # BLD AUTO: 0.02 10*3/MM3 (ref 0–0.2)
BASOPHILS NFR BLD AUTO: 0.2 % (ref 0–1.5)
BILIRUB SERPL-MCNC: 0.7 MG/DL (ref 0–1.2)
BUN SERPL-MCNC: 15 MG/DL (ref 6–20)
BUN/CREAT SERPL: 15.2 (ref 7–25)
CALCIUM SPEC-SCNC: 9.2 MG/DL (ref 8.6–10.5)
CHLORIDE SERPL-SCNC: 92 MMOL/L (ref 98–107)
CK SERPL-CCNC: 173 U/L (ref 20–200)
CO2 SERPL-SCNC: 31.4 MMOL/L (ref 22–29)
CREAT SERPL-MCNC: 0.99 MG/DL (ref 0.76–1.27)
D-LACTATE SERPL-SCNC: 1.1 MMOL/L (ref 0.5–2)
DEPRECATED RDW RBC AUTO: 51.8 FL (ref 37–54)
EGFRCR SERPLBLD CKD-EPI 2021: 88.3 ML/MIN/1.73
EOSINOPHIL # BLD AUTO: 0.05 10*3/MM3 (ref 0–0.4)
EOSINOPHIL NFR BLD AUTO: 0.4 % (ref 0.3–6.2)
ERYTHROCYTE [DISTWIDTH] IN BLOOD BY AUTOMATED COUNT: 14.1 % (ref 12.3–15.4)
GEN 5 2HR TROPONIN T REFLEX: 71 NG/L
GLOBULIN UR ELPH-MCNC: 3.1 GM/DL
GLUCOSE SERPL-MCNC: 113 MG/DL (ref 65–99)
HCT VFR BLD AUTO: 41.6 % (ref 37.5–51)
HGB BLD-MCNC: 13.8 G/DL (ref 13–17.7)
HOLD SPECIMEN: NORMAL
HOLD SPECIMEN: NORMAL
IMM GRANULOCYTES # BLD AUTO: 0.15 10*3/MM3 (ref 0–0.05)
IMM GRANULOCYTES NFR BLD AUTO: 1.2 % (ref 0–0.5)
LYMPHOCYTES # BLD AUTO: 0.81 10*3/MM3 (ref 0.7–3.1)
LYMPHOCYTES NFR BLD AUTO: 6.5 % (ref 19.6–45.3)
MCH RBC QN AUTO: 33 PG (ref 26.6–33)
MCHC RBC AUTO-ENTMCNC: 33.2 G/DL (ref 31.5–35.7)
MCV RBC AUTO: 99.5 FL (ref 79–97)
MONOCYTES # BLD AUTO: 0.53 10*3/MM3 (ref 0.1–0.9)
MONOCYTES NFR BLD AUTO: 4.2 % (ref 5–12)
NEUTROPHILS NFR BLD AUTO: 10.94 10*3/MM3 (ref 1.7–7)
NEUTROPHILS NFR BLD AUTO: 87.5 % (ref 42.7–76)
NRBC BLD AUTO-RTO: 0 /100 WBC (ref 0–0.2)
NT-PROBNP SERPL-MCNC: 903.6 PG/ML (ref 0–900)
PLATELET # BLD AUTO: 149 10*3/MM3 (ref 140–450)
PMV BLD AUTO: 9.7 FL (ref 6–12)
POTASSIUM SERPL-SCNC: 4 MMOL/L (ref 3.5–5.2)
PROCALCITONIN SERPL-MCNC: 0.58 NG/ML (ref 0–0.25)
PROT SERPL-MCNC: 6.6 G/DL (ref 6–8.5)
RBC # BLD AUTO: 4.18 10*6/MM3 (ref 4.14–5.8)
SODIUM SERPL-SCNC: 135 MMOL/L (ref 136–145)
TROPONIN T DELTA: -26 NG/L
TROPONIN T SERPL HS-MCNC: 97 NG/L
WBC NRBC COR # BLD AUTO: 12.5 10*3/MM3 (ref 3.4–10.8)
WHOLE BLOOD HOLD COAG: NORMAL
WHOLE BLOOD HOLD SPECIMEN: NORMAL

## 2024-08-16 PROCEDURE — 83605 ASSAY OF LACTIC ACID: CPT | Performed by: NURSE PRACTITIONER

## 2024-08-16 PROCEDURE — 99223 1ST HOSP IP/OBS HIGH 75: CPT | Performed by: INTERNAL MEDICINE

## 2024-08-16 PROCEDURE — 92610 EVALUATE SWALLOWING FUNCTION: CPT

## 2024-08-16 PROCEDURE — 84484 ASSAY OF TROPONIN QUANT: CPT | Performed by: EMERGENCY MEDICINE

## 2024-08-16 PROCEDURE — 25010000002 ENOXAPARIN PER 10 MG: Performed by: INTERNAL MEDICINE

## 2024-08-16 PROCEDURE — 71045 X-RAY EXAM CHEST 1 VIEW: CPT

## 2024-08-16 PROCEDURE — 36415 COLL VENOUS BLD VENIPUNCTURE: CPT

## 2024-08-16 PROCEDURE — 83880 ASSAY OF NATRIURETIC PEPTIDE: CPT | Performed by: EMERGENCY MEDICINE

## 2024-08-16 PROCEDURE — 93005 ELECTROCARDIOGRAM TRACING: CPT | Performed by: EMERGENCY MEDICINE

## 2024-08-16 PROCEDURE — 80053 COMPREHEN METABOLIC PANEL: CPT | Performed by: EMERGENCY MEDICINE

## 2024-08-16 PROCEDURE — 87040 BLOOD CULTURE FOR BACTERIA: CPT | Performed by: EMERGENCY MEDICINE

## 2024-08-16 PROCEDURE — 99285 EMERGENCY DEPT VISIT HI MDM: CPT

## 2024-08-16 PROCEDURE — 84145 PROCALCITONIN (PCT): CPT | Performed by: INTERNAL MEDICINE

## 2024-08-16 PROCEDURE — 25010000002 CEFTRIAXONE PER 250 MG: Performed by: NURSE PRACTITIONER

## 2024-08-16 PROCEDURE — 85025 COMPLETE CBC W/AUTO DIFF WBC: CPT | Performed by: EMERGENCY MEDICINE

## 2024-08-16 PROCEDURE — 82550 ASSAY OF CK (CPK): CPT | Performed by: INTERNAL MEDICINE

## 2024-08-16 RX ORDER — BISACODYL 10 MG
10 SUPPOSITORY, RECTAL RECTAL DAILY PRN
Status: DISCONTINUED | OUTPATIENT
Start: 2024-08-16 | End: 2024-08-18 | Stop reason: HOSPADM

## 2024-08-16 RX ORDER — BUDESONIDE 3 MG/1
9 CAPSULE, COATED PELLETS ORAL DAILY
Status: DISCONTINUED | OUTPATIENT
Start: 2024-08-17 | End: 2024-08-18 | Stop reason: HOSPADM

## 2024-08-16 RX ORDER — AZITHROMYCIN 250 MG/1
250 TABLET, FILM COATED ORAL
Status: DISCONTINUED | OUTPATIENT
Start: 2024-08-17 | End: 2024-08-18 | Stop reason: HOSPADM

## 2024-08-16 RX ORDER — POLYETHYLENE GLYCOL 3350 17 G/17G
17 POWDER, FOR SOLUTION ORAL DAILY PRN
Status: DISCONTINUED | OUTPATIENT
Start: 2024-08-16 | End: 2024-08-18 | Stop reason: HOSPADM

## 2024-08-16 RX ORDER — SODIUM CHLORIDE 0.9 % (FLUSH) 0.9 %
10 SYRINGE (ML) INJECTION AS NEEDED
Status: DISCONTINUED | OUTPATIENT
Start: 2024-08-16 | End: 2024-08-17

## 2024-08-16 RX ORDER — AMOXICILLIN 250 MG
2 CAPSULE ORAL 2 TIMES DAILY
Status: DISCONTINUED | OUTPATIENT
Start: 2024-08-16 | End: 2024-08-18 | Stop reason: HOSPADM

## 2024-08-16 RX ORDER — ACETAMINOPHEN 650 MG/1
650 SUPPOSITORY RECTAL EVERY 4 HOURS PRN
Status: DISCONTINUED | OUTPATIENT
Start: 2024-08-16 | End: 2024-08-18 | Stop reason: HOSPADM

## 2024-08-16 RX ORDER — RISPERIDONE 1 MG/1
2 TABLET ORAL EVERY MORNING
Status: DISCONTINUED | OUTPATIENT
Start: 2024-08-17 | End: 2024-08-18 | Stop reason: HOSPADM

## 2024-08-16 RX ORDER — ONDANSETRON 2 MG/ML
4 INJECTION INTRAMUSCULAR; INTRAVENOUS EVERY 6 HOURS PRN
Status: DISCONTINUED | OUTPATIENT
Start: 2024-08-16 | End: 2024-08-18 | Stop reason: HOSPADM

## 2024-08-16 RX ORDER — ENOXAPARIN SODIUM 100 MG/ML
40 INJECTION SUBCUTANEOUS DAILY
Status: DISCONTINUED | OUTPATIENT
Start: 2024-08-16 | End: 2024-08-18 | Stop reason: HOSPADM

## 2024-08-16 RX ORDER — BISACODYL 5 MG/1
5 TABLET, DELAYED RELEASE ORAL DAILY PRN
Status: DISCONTINUED | OUTPATIENT
Start: 2024-08-16 | End: 2024-08-18 | Stop reason: HOSPADM

## 2024-08-16 RX ORDER — CLONAZEPAM 0.5 MG/1
0.75 TABLET ORAL 4 TIMES DAILY
Status: DISCONTINUED | OUTPATIENT
Start: 2024-08-16 | End: 2024-08-18 | Stop reason: HOSPADM

## 2024-08-16 RX ORDER — L.ACID,PARA/B.BIFIDUM/S.THERM 8B CELL
1 CAPSULE ORAL 2 TIMES DAILY
Status: DISCONTINUED | OUTPATIENT
Start: 2024-08-16 | End: 2024-08-18 | Stop reason: HOSPADM

## 2024-08-16 RX ORDER — PANTOPRAZOLE SODIUM 40 MG/1
40 TABLET, DELAYED RELEASE ORAL
Status: DISCONTINUED | OUTPATIENT
Start: 2024-08-17 | End: 2024-08-18 | Stop reason: HOSPADM

## 2024-08-16 RX ORDER — ACETAMINOPHEN 325 MG/1
650 TABLET ORAL EVERY 4 HOURS PRN
Status: DISCONTINUED | OUTPATIENT
Start: 2024-08-16 | End: 2024-08-18 | Stop reason: HOSPADM

## 2024-08-16 RX ORDER — ZOLPIDEM TARTRATE 5 MG/1
5 TABLET ORAL NIGHTLY PRN
Status: DISCONTINUED | OUTPATIENT
Start: 2024-08-16 | End: 2024-08-18 | Stop reason: HOSPADM

## 2024-08-16 RX ORDER — LEVOTHYROXINE SODIUM 88 UG/1
88 TABLET ORAL DAILY
Status: DISCONTINUED | OUTPATIENT
Start: 2024-08-17 | End: 2024-08-18 | Stop reason: HOSPADM

## 2024-08-16 RX ORDER — METOPROLOL SUCCINATE 25 MG/1
12.5 TABLET, EXTENDED RELEASE ORAL DAILY
Status: DISCONTINUED | OUTPATIENT
Start: 2024-08-17 | End: 2024-08-18 | Stop reason: HOSPADM

## 2024-08-16 RX ORDER — SODIUM CHLORIDE 0.9 % (FLUSH) 0.9 %
10 SYRINGE (ML) INJECTION EVERY 12 HOURS SCHEDULED
Status: DISCONTINUED | OUTPATIENT
Start: 2024-08-16 | End: 2024-08-17

## 2024-08-16 RX ORDER — RISPERIDONE 1 MG/1
3 TABLET ORAL NIGHTLY
Status: DISCONTINUED | OUTPATIENT
Start: 2024-08-16 | End: 2024-08-18 | Stop reason: HOSPADM

## 2024-08-16 RX ORDER — METOPROLOL SUCCINATE 25 MG/1
25 TABLET, EXTENDED RELEASE ORAL DAILY
Status: DISCONTINUED | OUTPATIENT
Start: 2024-08-17 | End: 2024-08-16

## 2024-08-16 RX ORDER — SODIUM CHLORIDE 9 MG/ML
40 INJECTION, SOLUTION INTRAVENOUS AS NEEDED
Status: DISCONTINUED | OUTPATIENT
Start: 2024-08-16 | End: 2024-08-17

## 2024-08-16 RX ORDER — ACETAMINOPHEN 160 MG/5ML
650 SOLUTION ORAL EVERY 4 HOURS PRN
Status: DISCONTINUED | OUTPATIENT
Start: 2024-08-16 | End: 2024-08-18 | Stop reason: HOSPADM

## 2024-08-16 RX ADMIN — Medication 1 CAPSULE: at 21:57

## 2024-08-16 RX ADMIN — ENOXAPARIN SODIUM 40 MG: 100 INJECTION SUBCUTANEOUS at 18:06

## 2024-08-16 RX ADMIN — CLONAZEPAM 0.75 MG: 0.5 TABLET ORAL at 21:56

## 2024-08-16 RX ADMIN — CLONAZEPAM 0.75 MG: 0.5 TABLET ORAL at 18:06

## 2024-08-16 RX ADMIN — Medication 10 ML: at 21:00

## 2024-08-16 RX ADMIN — SENNOSIDES AND DOCUSATE SODIUM 2 TABLET: 50; 8.6 TABLET ORAL at 21:57

## 2024-08-16 RX ADMIN — CEFTRIAXONE 1000 MG: 1 INJECTION, POWDER, FOR SOLUTION INTRAMUSCULAR; INTRAVENOUS at 13:28

## 2024-08-16 RX ADMIN — RISPERIDONE 3 MG: 1 TABLET, FILM COATED ORAL at 21:57

## 2024-08-16 NOTE — THERAPY EVALUATION
Acute Care - Speech Language Pathology   Swallow Initial Evaluation  [facility]     Patient Name: Bossman Luna  : 1965  MRN: [id number]  Today's Date: 2024               Admit Date: 2024    Visit Dx:     ICD-10-CM ICD-9-CM   1. Pneumonia of both lungs due to infectious organism, unspecified part of lung  J18.9 483.8   2. Elevated troponin  R79.89 790.6     Patient Active Problem List   Diagnosis    Closed fracture of right hip    Intellectual disability    Anxiety associated with depression    Neurogenic bladder    Hypothyroidism (acquired)    Fall    Campylobacter diarrhea    Volume depletion    Lactic acidosis    Bacterial pneumonia    COVID-19 virus infection     Past Medical History:   Diagnosis Date    Anxiety     BPH with urinary obstruction     Cataract     bilateral    Development delay     GERD (gastroesophageal reflux disease)     Glaucoma     Hypertension     Impaired functional mobility, balance, gait, and endurance     Neurogenic bladder     Osteoporosis     Osteoporosis     Prostate disorder     Rash     Self-catheterizes urinary bladder     Thyroid disease     Urinary retention      Past Surgical History:   Procedure Laterality Date    CATARACT EXTRACTION W/ INTRAOCULAR LENS IMPLANT Left 3/9/2020    Procedure: CATARACT PHACO EXTRACTION WITH INTRAOCULAR LENS IMPLANT LEFT;  Surgeon: Winifred Peraza MD;  Location: [facility];  Service: Ophthalmology;  Laterality: Left;    COLONOSCOPY      ENDOSCOPY      HIP HEMIARTHROPLASTY Right 2021    Procedure: HIP HEMIARTHROPLASTY;  Surgeon: Go Sagastume MD;  Location: [facility];  Service: Orthopedics;  Laterality: Right;       SLP Recommendation and Plan  SLP Swallowing Diagnosis: functional oral phase, functional pharyngeal phase (24)  SLP Diet Recommendation: soft to chew textures, thin liquids (24)  Recommended Precautions and Strategies: upright posture during/after eating, general aspiration precautions,  small bites of food and sips of liquid, other (see comments) (encouragement, prompts and cues) (08/16/24 1658)  SLP Rec. for Method of Medication Administration: meds whole, with thin liquids, as tolerated (08/16/24 1658)     Monitor for Signs of Aspiration: notify SLP if any concerns, yes, gurgly voice, throat clearing, cough, right lower lobe infiltrates (08/16/24 1658)  Recommended Diagnostics: No further SLP services recommended (08/16/24 1658)  Swallow Criteria for Skilled Therapeutic Interventions Met: baseline status (08/16/24 1658)  Anticipated Discharge Disposition (SLP): group home, community-based residential facility (CB) (08/16/24 1658)     Therapy Frequency (Swallow): evaluation only (08/16/24 1658)     Oral Care Recommendations: Oral Care BID/PRN, Toothbrush (08/16/24 1658)                                               SWALLOW EVALUATION (Last 72 Hours)       SLP Adult Swallow Evaluation       Row Name 08/16/24 1658                   Rehab Evaluation    Document Type evaluation  -TM        Subjective Information no complaints  -TM        Patient Observations alert;cooperative  -TM        Patient/Family/Caregiver Comments/Observations caregiver present  -TM        Patient Effort good  -TM           General Information    Patient Profile Reviewed yes  -TM        Pertinent History Of Current Problem pneumonia, COVID  -TM        Current Method of Nutrition regular textures;thin liquids  -TM        Precautions/Limitations, Vision WFL;for purposes of eval  -TM        Precautions/Limitations, Hearing WFL;for purposes of eval  -TM        Prior Level of Function-Communication cognitive-linguistic impairment  intellectual disability  -TM        Prior Level of Function-Swallowing soft to chew;thin liquids  -TM        Plans/Goals Discussed with other (see comments)  caregiver; RN  -TM        Barriers to Rehab cognitive status  -TM        Patient's Goals for Discharge patient could not state  -TM           Pain     Additional Documentation Pain Scale: Numbers Pre/Post-Treatment (Group)  -TM           Pain Scale: Numbers Pre/Post-Treatment    Pretreatment Pain Rating 0/10 - no pain  -TM        Posttreatment Pain Rating 0/10 - no pain  -TM           Oral Motor Structure and Function    Oral Lesions or Structural Abnormalities and/or variants none identified  -TM        Dentition Assessment missing teeth;natural, present and adequate  -TM        Secretion Management WNL/WFL  -TM        Mucosal Quality moist, healthy  -TM        Volitional Cough WFL  -TM           Oral Musculature and Cranial Nerve Assessment    Oral Motor General Assessment WFL  -TM        Oral Motor, Comment some overflow oral movements, but baseline  -TM           General Eating/Swallowing Observations    Respiratory Support Currently in Use nasal cannula  -TM        O2 Liters 2L  -TM        Eating/Swallowing Skills fed by SLP;rate is too fast;impulsive self-feeding behaviors;unaware of safety concerns  -TM        Positioning During Eating upright in bed  -TM        Utensils Used spoon;straw  -TM        Consistencies Trialed regular textures;thin liquids;pureed;soft to chew textures  -TM        Pre SpO2 (%) 94  -TM        Post SpO2 (%) 94  -TM           Respiratory    Respiratory Status WFL;during swallowing/eating  -TM           Clinical Swallow Eval    Oral Prep Phase WFL  baseline  -TM        Oral Transit WFL  -TM        Oral Residue WFL  -TM        Pharyngeal Phase no overt signs/symptoms of pharyngeal impairment  -TM        Clinical Swallow Evaluation Summary Bedside eval of swallow completed with pt.'s caregiver present and reported that he has been tolerating a soft to chew diet with thin liquids prior to admit.  He has cognitive deficits at baseline from intellectual disability.  His caregiver reported  occasional occurrences of strangling when drinking thin liquids due to impulsivity with large amounts and consecutive swallows.  Considering his  cognitive deficits he has difficulty with safety awareness and self-monitoring.  He was given trials of regular and semi-soft solids, puree, and thin liquids.  Oral phase was WFL with trials presented.  No overt s/s aspiration or other pharyngeal phase dysphagia with any consistency  trialed, but had to be prompted to reduce rate of intake and drink size with poor carryover due to cognitive deficits.  He will need frequent prompts and cueing.  D/W RN and caregiver following eval.  Recommend:  1. continue soft to chew diet with thin liq as luc, 2. meds whole with thin liq as luc, 3. aspiration precautions, 4. encourage pt. with prompts and cues to reduce rate and amount of intake with thin liquids for safety.  No further identified needs or concerns at this time for ST.  -           SLP Evaluation Clinical Impression    SLP Swallowing Diagnosis functional oral phase;functional pharyngeal phase  -        Functional Impact risk of aspiration/pneumonia  due to poor awareness of safety and impulsivity and intake of large amounts  -        Swallow Criteria for Skilled Therapeutic Interventions Met baseline status  -           Recommendations    Therapy Frequency (Swallow) evaluation only  -        SLP Diet Recommendation soft to chew textures;thin liquids  -        Recommended Diagnostics No further SLP services recommended  -        Recommended Precautions and Strategies upright posture during/after eating;general aspiration precautions;small bites of food and sips of liquid;other (see comments)  encouragement, prompts and cues  -        Oral Care Recommendations Oral Care BID/PRN;Toothbrush  -        SLP Rec. for Method of Medication Administration meds whole;with thin liquids;as tolerated  -        Monitor for Signs of Aspiration notify SLP if any concerns;yes;gurgly voice;throat clearing;cough;right lower lobe infiltrates  -        Anticipated Discharge Disposition (SLP) group  home;community-based residential facility (CB)  -                  User Key  (r) = Recorded By, (t) = Taken By, (c) = Cosigned By      Initials Name Effective Dates    TM Esperanza Nix 06/16/21 -                     EDUCATION  The patient has been educated in the following areas:   Dysphagia (Swallowing Impairment) Oral Care/Hydration.                Time Calculation:    Time Calculation- SLP       Row Name 08/16/24 1732             Time Calculation- SLP    SLP Start Time 1456  -TM      SLP Received On 08/16/24  -         Untimed Charges    SLP Eval/Re-eval  ST Eval Oral Pharyng Swallow - 03904  -                User Key  (r) = Recorded By, (t) = Taken By, (c) = Cosigned By      Initials Name Provider Type     Esperanza Nix Speech and Language Pathologist                    Therapy Charges for Today       Code Description Service Date Service Provider Modifiers Qty    [id number] HC ST EVAL ORAL PHARYNG SWALLOW 4 8/16/2024 Esperanza Nix GN 1                 Esperanza Nix  8/16/2024

## 2024-08-16 NOTE — ED PROVIDER NOTES
Pt Name: Bossman Luna  MRN: [id number]  : 1965  Date of Encounter: 2024    PCP: System, Provider Not In      Subjective    History of Present Illness:    Chief Complaint: Generalized weakness, shortness of breath    History of Present Illness: Bossman Luna is a 59 y.o. male who presents to the ER via EMS with staff discussing with EMS provider about patient's weakness, shortness of breath.  EMS was told by group home staff that patient was tested positive for COVID and pneumonia 2 weeks ago has had worsening shortness of breath, generalized weakness.  Staff told EMS that this has started approximately 3 to 4 days ago and is progressively worsened over that interval.    Triage Vitals:    ED Triage Vitals [24 1122]   Temp Heart Rate Resp BP SpO2   98.1 °F (36.7 °C) 78 18 90/60 95 %      Temp src Heart Rate Source Patient Position BP Location FiO2 (%)   Oral Monitor Sitting Right arm --       Nurses Notes reviewed and agree, including vitals, allergies, social history and prior medical history.     Patient has no known allergies.    Past Medical History:   Diagnosis Date    Anxiety     BPH with urinary obstruction     Cataract     bilateral    Development delay     GERD (gastroesophageal reflux disease)     Glaucoma     Hypertension     Impaired functional mobility, balance, gait, and endurance     Neurogenic bladder     Osteoporosis     Osteoporosis     Prostate disorder     Rash     Self-catheterizes urinary bladder     Thyroid disease     Urinary retention        Past Surgical History:   Procedure Laterality Date    CATARACT EXTRACTION W/ INTRAOCULAR LENS IMPLANT Left 3/9/2020    Procedure: CATARACT PHACO EXTRACTION WITH INTRAOCULAR LENS IMPLANT LEFT;  Surgeon: Winifred Peraza MD;  Location: [facility];  Service: Ophthalmology;  Laterality: Left;    COLONOSCOPY      ENDOSCOPY      HIP HEMIARTHROPLASTY Right 2021    Procedure: HIP HEMIARTHROPLASTY;  Surgeon: Go Sagastume  MD;  Location: [facility];  Service: Orthopedics;  Laterality: Right;       Social History     Socioeconomic History    Marital status: Single   Tobacco Use    Smoking status: Never    Smokeless tobacco: Never   Vaping Use    Vaping status: Never Used   Substance and Sexual Activity    Alcohol use: No    Drug use: No    Sexual activity: Never       Family History   Family history unknown: Yes       REVIEW OF SYSTEMS:     All systems reviewed and not pertinent unless noted.    Review of Systems   Unable to perform ROS: Patient nonverbal       Objective    Physical Exam  Vitals and nursing note reviewed.   Constitutional:       Appearance: Normal appearance.   HENT:      Head: Normocephalic and atraumatic.   Eyes:      Extraocular Movements: Extraocular movements intact.      Pupils: Pupils are equal, round, and reactive to light.   Cardiovascular:      Rate and Rhythm: Normal rate and regular rhythm.      Pulses: Normal pulses.      Heart sounds: Normal heart sounds.   Pulmonary:      Effort: Pulmonary effort is normal.      Breath sounds: Decreased breath sounds and wheezing present.   Abdominal:      General: Bowel sounds are normal.      Palpations: Abdomen is soft.   Musculoskeletal:         General: Normal range of motion.      Cervical back: Normal range of motion and neck supple.   Skin:     General: Skin is warm and dry.      Capillary Refill: Capillary refill takes less than 2 seconds.   Neurological:      Mental Status: He is alert.      GCS: GCS eye subscore is 4. GCS verbal subscore is 5. GCS motor subscore is 6.      Sensory: Sensation is intact.      Motor: Motor function is intact.   Psychiatric:         Attention and Perception: Attention and perception normal.         Mood and Affect: Mood and affect normal.         Speech: Speech normal.       HEART SCORE  History: Slightly Suspicious (0)  ECG: Nonspecific repolarization or LBBB (1)  Age: 45-64 years old (1)  Risk factors: 1-2 Risk Factors  (1)  Troponin: more than 3 x normal (2)    This patient's HEART score is 5.    According to the HEART Score Study: Score (Risk of adverse cardiac event in the next 14 days)  Scores 0-3: (0.9-1.7%) In the HEART Score study, these patients were discharged home.  Scores 4-6: (12-16.6%)  In the HEART Score study, these patients were admitted to the hospital.  Scores ?7: (50-65%) In the HEART Score study, these patients were candidates for early invasive measures.   Final disposition is based on individual provider judgement and current clinical situation.                     Procedures    ED Course:    ECG 12 Lead ED Triage Standing Order; SOA   Final Result          ED Course as of 08/16/24 1254   Fri Aug 16, 2024   1113 EKG: I reviewed and independently interpreted the EKG as:  Sinus rhythm rate of 83 bpm, normal axis, normal intervals, no ST elevation, T wave inversion in the anterior leads [CS]      ED Course User Index  [CS] Lazaro Bone MD       Orders placed during this visit:    Orders Placed This Encounter   Procedures    Blood Culture With AMRIK - Blood,    Blood Culture With AMRIK - Blood,    XR Chest 1 View    [place] Draw    Comprehensive Metabolic Panel    BNP    Single High Sensitivity Troponin T    CBC Auto Differential    High Sensitivity Troponin T 2Hr    Lactic Acid, Plasma    NPO Diet NPO Type: Strict NPO    Undress & Gown    Continuous Pulse Oximetry    Vital Signs    Oxygen Therapy- Nasal Cannula; Titrate 1-6 LPM Per SpO2; 90 - 95%    ECG 12 Lead ED Triage Standing Order; SOA    Insert Peripheral IV    Insert Peripheral IV    Inpatient Admission    CBC & Differential    Green Top (Gel)    Lavender Top    Gold Top - SST    Light Blue Top       LAB Results:    Lab Results (last 24 hours)       Procedure Component Value Units Date/Time    CBC & Differential [[id number]]  (Abnormal) Collected: 08/16/24 1113    Specimen: Blood Updated: 08/16/24 1122    Narrative:      The following orders were  created for panel order CBC & Differential.  Procedure                               Abnormality         Status                     ---------                               -----------         ------                     CBC Auto Differential[[id number]]        Abnormal            Final result                 Please view results for these tests on the individual orders.    Comprehensive Metabolic Panel [[id number]]  (Abnormal) Collected: 08/16/24 1113    Specimen: Blood Updated: 08/16/24 1145     Glucose 113 mg/dL      BUN 15 mg/dL      Creatinine 0.99 mg/dL      Sodium 135 mmol/L      Potassium 4.0 mmol/L      Comment: Slight hemolysis detected by analyzer. Result may be falsely elevated.        Chloride 92 mmol/L      CO2 31.4 mmol/L      Calcium 9.2 mg/dL      Total Protein 6.6 g/dL      Albumin 3.5 g/dL      ALT (SGPT) 22 U/L      AST (SGOT) 33 U/L      Comment: Slight hemolysis detected by analyzer. Result may be falsely elevated.        Alkaline Phosphatase 53 U/L      Total Bilirubin 0.7 mg/dL      Globulin 3.1 gm/dL      A/G Ratio 1.1 g/dL      BUN/Creatinine Ratio 15.2     Anion Gap 11.6 mmol/L      eGFR 88.3 mL/min/1.73     Narrative:      GFR Normal >60  Chronic Kidney Disease <60  Kidney Failure <15      BNP [[id number]]  (Abnormal) Collected: 08/16/24 1113    Specimen: Blood Updated: 08/16/24 1148     proBNP 903.6 pg/mL     Narrative:      This assay is used as an aid in the diagnosis of individuals suspected of having heart failure. It can be used as an aid in the diagnosis of acute decompensated heart failure (ADHF) in patients presenting with signs and symptoms of ADHF to the emergency department (ED). In addition, NT-proBNP of <300 pg/mL indicates ADHF is not likely.    Age Range Result Interpretation  NT-proBNP Concentration (pg/mL:      <50             Positive            >450                   Gray                 300-450                    Negative             <300    50-75           Positive             >900                  Gray                300-900                  Negative            <300      >75             Positive            >1800                  Gray                300-1800                  Negative            <300    Single High Sensitivity Troponin T [[id number]]  (Abnormal) Collected: 08/16/24 1113    Specimen: Blood Updated: 08/16/24 1159     HS Troponin T 97 ng/L     Narrative:      High Sensitive Troponin T Reference Range:  <14.0 ng/L- Negative Female for AMI  <22.0 ng/L- Negative Male for AMI  >=14 - Abnormal Female indicating possible myocardial injury.  >=22 - Abnormal Male indicating possible myocardial injury.   Clinicians would have to utilize clinical acumen, EKG, Troponin, and serial changes to determine if it is an Acute Myocardial Infarction or myocardial injury due to an underlying chronic condition.         CBC Auto Differential [[id number]]  (Abnormal) Collected: 08/16/24 1113    Specimen: Blood Updated: 08/16/24 1122     WBC 12.50 10*3/mm3      RBC 4.18 10*6/mm3      Hemoglobin 13.8 g/dL      Hematocrit 41.6 %      MCV 99.5 fL      MCH 33.0 pg      MCHC 33.2 g/dL      RDW 14.1 %      RDW-SD 51.8 fl      MPV 9.7 fL      Platelets 149 10*3/mm3      Neutrophil % 87.5 %      Lymphocyte % 6.5 %      Monocyte % 4.2 %      Eosinophil % 0.4 %      Basophil % 0.2 %      Immature Grans % 1.2 %      Neutrophils, Absolute 10.94 10*3/mm3      Lymphocytes, Absolute 0.81 10*3/mm3      Monocytes, Absolute 0.53 10*3/mm3      Eosinophils, Absolute 0.05 10*3/mm3      Basophils, Absolute 0.02 10*3/mm3      Immature Grans, Absolute 0.15 10*3/mm3      nRBC 0.0 /100 WBC     Lactic Acid, Plasma [[id number]] Collected: 08/16/24 1221    Specimen: Blood Updated: 08/16/24 1231             If labs were ordered, I have independently reviewed the results and considered them in the diagnosis and treatment plan for the patient    RADIOLOGY    XR Chest 1 View    Result Date: 8/16/2024  PROCEDURE: XR CHEST 1 VW-   HISTORY: SOA Triage Protocol  COMPARISON: 07/27/2016  FINDINGS:  Portable view of the chest demonstrates vague opacity right midlung suspicious for pneumonia. Lungs are hypoinflated. There is mild left perihilar nodularity nonspecific. There is no evidence of effusion, pneumothorax or other significant pleural disease. The mediastinum is unremarkable.  The heart size is normal.      Impression: 1. Findings suspicious for right perihilar pneumonia. 2. Mild left perihilar nodularity. Nonemergent chest CT follow-up may be considered.    This report was signed and finalized on 8/16/2024 11:51 AM by Eros Guevara MD.        If I have ordered, I have independently reviewed the above noted radiographic studies.  Please see the radiologist dictation for the official interpretation    Medications given to patient in the ER    Medications   sodium chloride 0.9 % flush 10 mL (has no administration in time range)   sodium chloride 0.9 % flush 10 mL (has no administration in time range)   azithromycin (ZITHROMAX) 500 mg in sodium chloride 0.9 % 250 mL IVPB-VTB (has no administration in time range)   cefTRIAXone (ROCEPHIN) 1,000 mg in sodium chloride 0.9 % 100 mL IVPB-VTB (has no administration in time range)       AS OF 12:54 EDT VITALS:    BP - 95/71  HR - 79  TEMP - 98.1 °F (36.7 °C) (Oral)  O2 SATS - 93%         Shared Decision Making: After my consideration of the clinical presentation and laboratory/radiology studies obtained, I have discussed the findings with the patient/patient representative who is in agreement with the treatment plan and final disposition. Risks and benefits of discharge and/or observation admission were discussed.  Final disposition of the patient will be admitted.  Patient is requested to follow-up with primary care provider and specialist in 1 week following final discharge.    Discharge Medications Prescribed:  No new home medications were prescribed during this ER visit    Medical Decision  Minerva Luna is a 59 y.o. male who presents to the ER via EMS with staff discussing with EMS provider about patient's weakness, shortness of breath.  EMS was told by group home staff that patient was tested positive for COVID and pneumonia 2 weeks ago has had worsening shortness of breath, generalized weakness.  Staff told EMS that this has started approximately 3 to 4 days ago and is progressively worsened over that interval.    DDX: includes but is not limited to: NSTEMI, STEMI, chest pain unspecified, CHF exacerbation, bilateral pneumonia, COVID, other    Amount and/or Complexity of Data Reviewed  Labs: ordered. Decision-making details documented in ED Course.     Details: I have personally reviewed and documented all results  Radiology: ordered. Decision-making details documented in ED Course.     Details: I have personally reviewed and documented all results  ECG/medicine tests: ordered.     Details: I have personally reviewed and documented all results  Discussion of management or test interpretation with external provider(s): Discussed assessment, treatment and plan with ER attending    Risk  Prescription drug management.  Risk Details: I have discussed with patient the finding of the test preformed today. Patient has been diagnosed with bilateral pneumonia, elevated troponin and will be admitted to the hospital.             Final diagnoses:   Pneumonia of both lungs due to infectious organism, unspecified part of lung   Elevated troponin       Please note that portions of this document were completed using voice recognition dictation software.       Aníbal Rios, APRN  08/16/24 [id number]

## 2024-08-16 NOTE — H&P
[facility]   HISTORY AND PHYSICAL      Name:  Bossman Luna   Age:  59 y.o.  Sex:  male  :  1965  MRN:  [id number]   Visit Number:  [id number]  Admission Date:  2024  Date Of Service:  24  Primary Care Physician:  System, Provider Not In    Chief Complaint:     Generalized weakness, cough and shortness of breath.    History Of Presenting Illness:      Bossman Luna is a 59-year-old pleasant male, resident of [facility] with history of intellectual disability, BPH with urinary retention requiring In-N-Out catheterization 3 times a day, GERD, hypertension, recent history of COVID-19, 2 weeks ago status post Paxlovid was brought to the emergency room by EMS with symptoms of generalized weakness, shortness of breath and cough.  According to the caregiver, patient is usually very alert and active including walking and feeding himself.  However, in the last couple of days, he has had progressive worsening of generalized weakness, fatigue and was not his usual self.  No history of any fever.    In the emergency room, he was afebrile and hemodynamically stable saturating at 95% on 1 L of nasal cannula oxygen.  Initial blood pressure was 90/60.  Initial troponin was 97 with repeat at 71.  CMP was unremarkable except for a sodium of 135.  Lactic acid was normal.  CBC showed a hemoglobin of 14 and WBC of 12.5.  Blood cultures were drawn in the emergency room.  Chest x-ray showed findings suspicious for right perihilar pneumonia and mild left hilar nodularity.  Patient was given ceftriaxone and azithromycin in the emergency room and was subsequently admitted to the medical floor with telemetry for management of post COVID-19 pneumonia.    Review Of Systems:    All systems were reviewed and negative except as mentioned in history of presenting illness, assessment and plan.    Past Medical History: Patient  has a past medical history of Anxiety, BPH  with urinary obstruction, Cataract, Development delay, GERD (gastroesophageal reflux disease), Glaucoma, Hypertension, Impaired functional mobility, balance, gait, and endurance, Neurogenic bladder, Osteoporosis, Osteoporosis, Prostate disorder, Rash, Self-catheterizes urinary bladder, Thyroid disease, and Urinary retention.    Past Surgical History: Patient  has a past surgical history that includes Colonoscopy; Esophagogastroduodenoscopy; Cataract extraction w/ intraocular lens implant (Left, 3/9/2020); and Hip hemiArthroplasty (Right, 2/11/2021).    Social History: Patient  reports that he has never smoked. He has never used smokeless tobacco. He reports that he does not drink alcohol and does not use drugs.    Family History:  Nothing significant to the current illness.    Allergies:      Patient has no known allergies.    Home Medications:    Prior to Admission Medications       Prescriptions Last Dose Informant Patient Reported? Taking?    acetaminophen (TYLENOL) 325 MG tablet   No No    Take 2 tablets by mouth Every 6 (Six) Hours As Needed for Mild Pain  or Moderate Pain .    acetaminophen (TYLENOL) 500 MG tablet   Yes No    Take 1,000 mg by mouth 3 (Three) Times a Day. 0800, 1200, 2000   Take two tablets three times daily while awake and two tablets as needed.    Budesonide (ENTOCORT EC) 3 MG 24 hr capsule  Self Yes No    Take 9 mg by mouth Daily.    Calcium Carb-Cholecalciferol (Oyster Shell Calcium Plus D) 500-200 MG-UNIT tablet  Pharmacy Yes No    Take 1 tablet by mouth Daily.    ciclopirox (Ciclodan) 8 % solution   Yes No    Apply 1 application topically to the appropriate area as directed Every Night. Apply to affected toe nails as directed    clonazePAM (KlonoPIN) 0.5 MG tablet  Self Yes No    Take 0.75 mg by mouth 4 (Four) Times a Day. Taking 1 1/2 tablets 0800, 1100, 1400, nightly    docusate sodium (COLACE) 250 MG capsule  Self Yes No    Take 250 mg by mouth Every Night.    esomeprazole (nexIUM) 40  MG capsule   Yes No    Take 40 mg by mouth Every Morning Before Breakfast.    FLUoxetine (PROzac) 20 MG capsule  Self Yes No    Take 20 mg by mouth Daily.    fluticasone (FLONASE) 50 MCG/ACT nasal spray  Self Yes No    2 sprays into the nostril(s) as directed by provider Daily.    levothyroxine (SYNTHROID, LEVOTHROID) 88 MCG tablet  Self Yes No    Take 88 mcg by mouth Daily.    losartan (COZAAR) 25 MG tablet   Yes No    Take 25 mg by mouth Every Night.    melatonin 3 MG tablet   Yes No    Take 6 mg by mouth Every Night.    melatonin 5 MG tablet tablet   Yes No    Take 10 mg by mouth Every Night.    Patient not taking:  Reported on 3/29/2021    metoprolol succinate XL (TOPROL-XL) 25 MG 24 hr tablet  Self Yes No    Take 25 mg by mouth Daily.    Multiple Vitamin (MULTIVITAMIN) tablet  Self Yes No    Take 1 tablet by mouth Daily.    Nutritional Supplements (BOOST SMOOTHIE PO)  Self Yes No    Take 1 can by mouth 2 (Two) Times a Day As Needed.    pantoprazole (PROTONIX) 40 MG EC tablet  Self Yes No    Take 40 mg by mouth Every Night.    Patient not taking:  Reported on 3/29/2021    potassium chloride (K-DUR,KLOR-CON) 10 MEQ CR tablet   Yes No    Take 10 mEq by mouth Daily.    risperiDONE (risperDAL) 2 MG tablet  Self Yes No    Take 2 mg by mouth Every Morning.    risperiDONE (risperDAL) 3 MG tablet   Yes No    Take 3 mg by mouth Every Night.    tamsulosin (FLOMAX) 0.4 MG capsule 24 hr capsule  Self No No    TAKE ONE CAPSULE BY MOUTH NIGHTLY AT BEDTIME    Patient taking differently:  Take 2 capsules by mouth Every Night.    zolpidem (AMBIEN) 5 MG tablet  Self Yes No    Take 5 mg by mouth At Night As Needed.     ED Medications:    Medications   sodium chloride 0.9 % flush 10 mL (has no administration in time range)   sodium chloride 0.9 % flush 10 mL (has no administration in time range)   azithromycin (ZITHROMAX) 500 mg in sodium chloride 0.9 % 250 mL IVPB-VTB (has no administration in time range)   cefTRIAXone (ROCEPHIN)  "1,000 mg in sodium chloride 0.9 % 100 mL IVPB-VTB (1,000 mg Intravenous New Bag 8/16/24 1328)     Vital Signs:  Temp:  [98.1 °F (36.7 °C)] 98.1 °F (36.7 °C)  Heart Rate:  [77-80] 79  Resp:  [18] 18  BP: ()/(52-71) 90/58        08/16/24  1123   Weight: 56.2 kg (124 lb)     Body mass index is 22.68 kg/m².    Physical Exam:     Most recent vital Signs: BP 90/58   Pulse 79   Temp 98.1 °F (36.7 °C) (Oral)   Resp 18   Ht 157.5 cm (62\")   Wt 56.2 kg (124 lb)   SpO2 90%   BMI 22.68 kg/m²     Physical Exam  Constitutional:       General: He is not in acute distress.     Appearance: He is not ill-appearing.   HENT:      Head: Normocephalic and atraumatic.      Right Ear: External ear normal.      Left Ear: External ear normal.      Nose: Nose normal.      Mouth/Throat:      Mouth: Mucous membranes are moist.   Eyes:      Extraocular Movements: Extraocular movements intact.      Conjunctiva/sclera: Conjunctivae normal.   Cardiovascular:      Rate and Rhythm: Normal rate and regular rhythm.      Pulses: Normal pulses.      Heart sounds: Normal heart sounds. No murmur heard.  Pulmonary:      Effort: Pulmonary effort is normal.      Breath sounds: Rales present. No wheezing.   Abdominal:      General: Bowel sounds are normal.      Palpations: Abdomen is soft.      Tenderness: There is no abdominal tenderness. There is no guarding or rebound.   Musculoskeletal:         General: Normal range of motion.      Cervical back: Neck supple.      Right lower leg: No edema.      Left lower leg: No edema.   Skin:     General: Skin is warm.      Findings: No erythema or rash.   Neurological:      General: No focal deficit present.      Mental Status: He is alert. Mental status is at baseline.   Psychiatric:         Mood and Affect: Mood normal.         Behavior: Behavior normal.       Laboratory data:    I have reviewed the labs done in the emergency room.    Results from last 7 days   Lab Units 08/16/24  1113   SODIUM mmol/L " 135*   POTASSIUM mmol/L 4.0   CHLORIDE mmol/L 92*   CO2 mmol/L 31.4*   BUN mg/dL 15   CREATININE mg/dL 0.99   CALCIUM mg/dL 9.2   BILIRUBIN mg/dL 0.7   ALK PHOS U/L 53   ALT (SGPT) U/L 22   AST (SGOT) U/L 33   GLUCOSE mg/dL 113*     Results from last 7 days   Lab Units 08/16/24  1113   WBC 10*3/mm3 12.50*   HEMOGLOBIN g/dL 13.8   HEMATOCRIT % 41.6   PLATELETS 10*3/mm3 149         Results from last 7 days   Lab Units 08/16/24  1306 08/16/24  1113   HSTROP T ng/L 71* 97*     Results from last 7 days   Lab Units 08/16/24  1113   PROBNP pg/mL 903.6*     EKG:      EKG done in the emergency room was reviewed by me.  It shows sinus rhythm at 83 bpm.  Normal axis.  T inversions noted in V1-V4.    Radiology:    XR Chest 1 View    Result Date: 8/16/2024  PROCEDURE: XR CHEST 1 VW-  HISTORY: SOA Triage Protocol  COMPARISON: 07/27/2016  FINDINGS:  Portable view of the chest demonstrates vague opacity right midlung suspicious for pneumonia. Lungs are hypoinflated. There is mild left perihilar nodularity nonspecific. There is no evidence of effusion, pneumothorax or other significant pleural disease. The mediastinum is unremarkable.  The heart size is normal.      1. Findings suspicious for right perihilar pneumonia. 2. Mild left perihilar nodularity. Nonemergent chest CT follow-up may be considered.    This report was signed and finalized on 8/16/2024 11:51 AM by Eros Guevara MD.       Assessment:    Bilateral perihilar pneumonia, unable to classify further, POA.  Recent COVID-19 infection status post Paxlovid.  BPH with chronic urinary retention.  Essential hypertension.  GERD.  Intellectual disability.    Plan:    Bacterial pneumonia with recent COVID-19 infection.  - Continue Rocephin and azithromycin.  - He is on minimal oxygen at 1 L.  - I will hold off on steroid therapy at this time unless his respiratory failure worsens.  - Obtain speech therapy to rule out dysphagia.    BPH/essential hypertension.  - Continue home  medications.  - In-N-Out catheterization 3 times daily.    I have discussed the patient's condition and treatment plan with his caregiver Albina who is at the bedside.  Patient has a sister in town and that his mother who is his guardian apparently is in a nursing home.  Patient's CODE STATUS is full code per caregiver.  Discussed with nursing staff at the bedside.    Risk Assessment: Moderate  DVT Prophylaxis: Enoxaparin  Code Status: Full  Diet: Regular      Ranulfo Barnett MD  08/16/24  14:31 EDT    Dictated utilizing Dragon dictation.

## 2024-08-16 NOTE — CASE MANAGEMENT/SOCIAL WORK
Case Management/Social Work    Patient Name:  Bossman Luna  YOB: 1965  MRN: [id number]  Admit Date:  8/16/2024    1310:  [facility].  She informed me that the patient's mother (and listed guardian) is a long term resident in a nursing home.  The [facility] has the patient's sister, Sandy Sy, listed as next of kin.  Caregiver provided me with contact number for sister.    1315:  Spoke with patient's sister, Sandy Sy ([phone]).  She confirms that her mother has dementia and is a resident in a nursing home.  The sister is actually the patient's mother's POA.  The patient's sister says she is aware that she needs to take steps to become guardian for the patient.  At this time she did give consent to treat the patient and was able to acknowledge the IMM.    1324:  Attempted to call patient's sister back.  The call went to [employer].  Left message for the sister with the number for the Guardianship Program for [place] ([phone]).      Electronically signed by:  Ximena Ruby RN  08/16/24 13:25 EDT

## 2024-08-16 NOTE — PLAN OF CARE
Goal Outcome Evaluation:            Bedside eval of swallow completed with pt.'s caregiver present and reported that he has been tolerating a soft to chew diet with thin liquids prior to admit. He has cognitive deficits at baseline from intellectual disability. His caregiver reported occasional occurrences of strangling when drinking thin liquids due to impulsivity with large amounts and consecutive swallows. Considering his cognitive deficits he has difficulty with safety awareness and self-monitoring. He was given trials of regular and semi-soft solids, puree, and thin liquids. Oral phase was WFL with trials presented. No overt s/s aspiration or other pharyngeal phase dysphagia with any consistency trialed, but had to be prompted to reduce rate of intake and drink size with poor carryover due to cognitive deficits. He will need frequent prompts and cueing. D/W RN and caregiver following eval. Recommend: 1. continue soft to chew diet with thin liq as luc, 2. meds whole with thin liq as luc, 3. aspiration precautions, 4. encourage pt. with prompts and cues to reduce rate and amount of intake with thin liquids for safety. No further identified needs or concerns at this time for ST.          Anticipated Discharge Disposition (SLP): group home, community-based residential facility ([facility])          SLP Swallowing Diagnosis: functional oral phase, functional pharyngeal phase (08/16/24 0653)

## 2024-08-16 NOTE — PAYOR COMM NOTE
"TO:MK  FROM:OLU KOTHARI,RN PHONE [phone]  CLINICALS REF# [id number]    Von Sue (59 y.o. Male)       Date of Birth   1965    Social Security Number       Address   [address] [facility] [address]    Home Phone   [phone]    MRN   [id number]       Judaism   None    Marital Status   Single                            Admission Date   8/16/24    Admission Type   Emergency    Admitting Provider   Ranulfo Barnett MD    Attending Provider   Ranulfo Barnett MD    Department, Room/Bed   [facility] TELEMETRY 3, 306/1       Discharge Date       Discharge Disposition       Discharge Destination                                 Attending Provider: Ranulfo Barnett MD    Allergies: No Known Allergies    Isolation: Enh Drop/Con   Infection: None   Code Status: Prior    Ht: 157.5 cm (62\")   Wt: 56.2 kg (124 lb)    Admission Cmt: None   Principal Problem: Bacterial pneumonia [J15.9]                   Active Insurance as of 8/16/2024       Primary Coverage       Payor Plan Insurance Group Employer/Plan Group    MEDICARE MEDICARE A & B        Payor Plan Address Payor Plan Phone Number Payor Plan Fax Number Effective Dates    [address] [phone]  5/1/1986 - None Entered    [facility] [id number]         Subscriber Name Subscriber Birth Date Member ID       VON SUE 1965 [id number]               Secondary Coverage       Payor Plan Insurance Group Employer/Plan Group    [place]        Payor Plan Address Payor Plan Phone Number Payor Plan Fax Number Effective Dates    [address] [phone]  7/13/2016 - None Entered    [facility] [id number]         Subscriber Name Subscriber Birth Date Member ID       VON SUE 1965 [id number]                     Emergency Contacts        (Rel.) Home Phone Work Phone Mobile Phone    ArpanSandycha Sy (Sister) [phone] -- --    Edel Brooke (Care Giver) " [phone] -- [phone]    RE BARRIGA (Care Giver) [phone] -- --              History & Physical    No notes of this type exist for this encounter.          Emergency Department Notes        Flor Vega at 24 1300       Summary:Abnormal Vitals                 JEANIE Chan notified of patients abnormal O2.    Electronically signed by Flor Vega at 24 1300       Aníbal Rios APRN at 24 1234       Attestation signed by Lazaro Bone MD at 24 1353    I performed a substantive part of the MDM during the patient's E/M visit. I personally made or approved the documented management plan and acknowledge its risk of complications. My personal findings/interpretations are below:    HPI/MDM:  Briefly, Bossman Luna is a 59 y.o. male presenting to the ED c/o: Weakness, shortness of breath, patient is positive for COVID 2 weeks ago, is again progressively weak, he was found to have continued pneumonia, elevated troponin, significant weakness.  The patient was admitted to the hospital for further management    Interpretations:    O2 Sat: The patients oxygen saturation was 95% on Room Air.  This was independently interpreted by me as Normal        Radiology: I ordered and independently reviewed the above noted radiographic studies.  I viewed images of Chest Xray which showed Pneumonia per my independent interpretation. See radiologist's dictation for official interpretation.     ED Course as of 24 1352   Fri Aug 16, 2024   1113 EKG: I reviewed and independently interpreted the EKG as:  Sinus rhythm rate of 83 bpm, normal axis, normal intervals, no ST elevation, T wave inversion in the anterior leads [CS]      ED Course User Index  [CS] Lazaro Bone MD Christopher B Sponaugle, MD 2024 13:52 EDT                         Pt Name: Bossman Luna  MRN: [id number]  : 1965  Date of Encounter: 2024    PCP: System,  Provider Not In      Subjective    History of Present Illness:    Chief Complaint: Generalized weakness, shortness of breath    History of Present Illness: Bossman Luna is a 59 y.o. male who presents to the ER via EMS with staff discussing with EMS provider about patient's weakness, shortness of breath.  EMS was told by group [place] staff that patient was tested positive for COVID and pneumonia 2 weeks ago has had worsening shortness of breath, generalized weakness.  Staff told EMS that this has started approximately 3 to 4 days ago and is progressively worsened over that interval.    Triage Vitals:    ED Triage Vitals [08/16/24 1122]   Temp Heart Rate Resp BP SpO2   98.1 °F (36.7 °C) 78 18 90/60 95 %      Temp src Heart Rate Source Patient Position BP Location FiO2 (%)   Oral Monitor Sitting Right arm --       Nurses Notes reviewed and agree, including vitals, allergies, social history and prior medical history.     Patient has no known allergies.    Past Medical History:   Diagnosis Date    Anxiety     BPH with urinary obstruction     Cataract     bilateral    Development delay     GERD (gastroesophageal reflux disease)     Glaucoma     Hypertension     Impaired functional mobility, balance, gait, and endurance     Neurogenic bladder     Osteoporosis     Osteoporosis     Prostate disorder     Rash     Self-catheterizes urinary bladder     Thyroid disease     Urinary retention        Past Surgical History:   Procedure Laterality Date    CATARACT EXTRACTION W/ INTRAOCULAR LENS IMPLANT Left 3/9/2020    Procedure: CATARACT PHACO EXTRACTION WITH INTRAOCULAR LENS IMPLANT LEFT;  Surgeon: Winifred Peraza MD;  Location: [facility] OR;  Service: Ophthalmology;  Laterality: Left;    COLONOSCOPY      ENDOSCOPY      HIP HEMIARTHROPLASTY Right 2/11/2021    Procedure: HIP HEMIARTHROPLASTY;  Surgeon: Go Sagastume MD;  Location: [facility] OR;  Service: Orthopedics;  Laterality: Right;       Social History     Socioeconomic  History    Marital status: Single   Tobacco Use    Smoking status: Never    Smokeless tobacco: Never   Vaping Use    Vaping status: Never Used   Substance and Sexual Activity    Alcohol use: No    Drug use: No    Sexual activity: Never       Family History   Family history unknown: Yes       REVIEW OF SYSTEMS:     All systems reviewed and not pertinent unless noted.    Review of Systems   Unable to perform ROS: Patient nonverbal       Objective    Physical Exam  Vitals and nursing note reviewed.   Constitutional:       Appearance: Normal appearance.   HENT:      Head: Normocephalic and atraumatic.   Eyes:      Extraocular Movements: Extraocular movements intact.      Pupils: Pupils are equal, round, and reactive to light.   Cardiovascular:      Rate and Rhythm: Normal rate and regular rhythm.      Pulses: Normal pulses.      Heart sounds: Normal heart sounds.   Pulmonary:      Effort: Pulmonary effort is normal.      Breath sounds: Decreased breath sounds and wheezing present.   Abdominal:      General: Bowel sounds are normal.      Palpations: Abdomen is soft.   Musculoskeletal:         General: Normal range of motion.      Cervical back: Normal range of motion and neck supple.   Skin:     General: Skin is warm and dry.      Capillary Refill: Capillary refill takes less than 2 seconds.   Neurological:      Mental Status: He is alert.      GCS: GCS eye subscore is 4. GCS verbal subscore is 5. GCS motor subscore is 6.      Sensory: Sensation is intact.      Motor: Motor function is intact.   Psychiatric:         Attention and Perception: Attention and perception normal.         Mood and Affect: Mood and affect normal.         Speech: Speech normal.       HEART SCORE  History: Slightly Suspicious (0)  ECG: Nonspecific repolarization or LBBB (1)  Age: 45-64 years old (1)  Risk factors: 1-2 Risk Factors (1)  Troponin: more than 3 x normal (2)    This patient's HEART score is 5.    According to the HEART Score Study:  Score (Risk of adverse cardiac event in the next 14 days)  Scores 0-3: (0.9-1.7%) In the HEART Score study, these patients were discharged home.  Scores 4-6: (12-16.6%)  In the HEART Score study, these patients were admitted to the hospital.  Scores ?7: (50-65%) In the HEART Score study, these patients were candidates for early invasive measures.   Final disposition is based on individual provider judgement and current clinical situation.                     Procedures    ED Course:    ECG 12 Lead ED Triage Standing Order; SOA   Final Result          ED Course as of 08/16/24 1254   Fri Aug 16, 2024   1113 EKG: I reviewed and independently interpreted the EKG as:  Sinus rhythm rate of 83 bpm, normal axis, normal intervals, no ST elevation, T wave inversion in the anterior leads [CS]      ED Course User Index  [CS] Lazaro Bone MD       Orders placed during this visit:    Orders Placed This Encounter   Procedures    Blood Culture With AMRIK - Blood,    Blood Culture With AMRIK - Blood,    XR Chest 1 View    [place] Draw    Comprehensive Metabolic Panel    BNP    Single High Sensitivity Troponin T    CBC Auto Differential    High Sensitivity Troponin T 2Hr    Lactic Acid, Plasma    NPO Diet NPO Type: Strict NPO    Undress & Gown    Continuous Pulse Oximetry    Vital Signs    Oxygen Therapy- Nasal Cannula; Titrate 1-6 LPM Per SpO2; 90 - 95%    ECG 12 Lead ED Triage Standing Order; SOA    Insert Peripheral IV    Insert Peripheral IV    Inpatient Admission    CBC & Differential    Green Top (Gel)    Lavender Top    Gold Top - SST    Light Blue Top       LAB Results:    Lab Results (last 24 hours)       Procedure Component Value Units Date/Time    CBC & Differential [[id number]]  (Abnormal) Collected: 08/16/24 1113    Specimen: Blood Updated: 08/16/24 1122    Narrative:      The following orders were created for panel order CBC & Differential.  Procedure                               Abnormality         Status                      ---------                               -----------         ------                     CBC Auto Differential[[id number]]        Abnormal            Final result                 Please view results for these tests on the individual orders.    Comprehensive Metabolic Panel [[id number]]  (Abnormal) Collected: 08/16/24 1113    Specimen: Blood Updated: 08/16/24 1145     Glucose 113 mg/dL      BUN 15 mg/dL      Creatinine 0.99 mg/dL      Sodium 135 mmol/L      Potassium 4.0 mmol/L      Comment: Slight hemolysis detected by analyzer. Result may be falsely elevated.        Chloride 92 mmol/L      CO2 31.4 mmol/L      Calcium 9.2 mg/dL      Total Protein 6.6 g/dL      Albumin 3.5 g/dL      ALT (SGPT) 22 U/L      AST (SGOT) 33 U/L      Comment: Slight hemolysis detected by analyzer. Result may be falsely elevated.        Alkaline Phosphatase 53 U/L      Total Bilirubin 0.7 mg/dL      Globulin 3.1 gm/dL      A/G Ratio 1.1 g/dL      BUN/Creatinine Ratio 15.2     Anion Gap 11.6 mmol/L      eGFR 88.3 mL/min/1.73     Narrative:      GFR Normal >60  Chronic Kidney Disease <60  Kidney Failure <15      BNP [[id number]]  (Abnormal) Collected: 08/16/24 1113    Specimen: Blood Updated: 08/16/24 1148     proBNP 903.6 pg/mL     Narrative:      This assay is used as an aid in the diagnosis of individuals suspected of having heart failure. It can be used as an aid in the diagnosis of acute decompensated heart failure (ADHF) in patients presenting with signs and symptoms of ADHF to the emergency department (ED). In addition, NT-proBNP of <300 pg/mL indicates ADHF is not likely.    Age Range Result Interpretation  NT-proBNP Concentration (pg/mL:      <50             Positive            >450                   Gray                 300-450                    Negative             <300    50-75           Positive            >900                  Gray                300-900                  Negative            <300      >75              Positive            >1800                  Gray                300-1800                  Negative            <300    Single High Sensitivity Troponin T [[id number]]  (Abnormal) Collected: 08/16/24 1113    Specimen: Blood Updated: 08/16/24 1159     HS Troponin T 97 ng/L     Narrative:      High Sensitive Troponin T Reference Range:  <14.0 ng/L- Negative Female for AMI  <22.0 ng/L- Negative Male for AMI  >=14 - Abnormal Female indicating possible myocardial injury.  >=22 - Abnormal Male indicating possible myocardial injury.   Clinicians would have to utilize clinical acumen, EKG, Troponin, and serial changes to determine if it is an Acute Myocardial Infarction or myocardial injury due to an underlying chronic condition.         CBC Auto Differential [[id number]]  (Abnormal) Collected: 08/16/24 1113    Specimen: Blood Updated: 08/16/24 1122     WBC 12.50 10*3/mm3      RBC 4.18 10*6/mm3      Hemoglobin 13.8 g/dL      Hematocrit 41.6 %      MCV 99.5 fL      MCH 33.0 pg      MCHC 33.2 g/dL      RDW 14.1 %      RDW-SD 51.8 fl      MPV 9.7 fL      Platelets 149 10*3/mm3      Neutrophil % 87.5 %      Lymphocyte % 6.5 %      Monocyte % 4.2 %      Eosinophil % 0.4 %      Basophil % 0.2 %      Immature Grans % 1.2 %      Neutrophils, Absolute 10.94 10*3/mm3      Lymphocytes, Absolute 0.81 10*3/mm3      Monocytes, Absolute 0.53 10*3/mm3      Eosinophils, Absolute 0.05 10*3/mm3      Basophils, Absolute 0.02 10*3/mm3      Immature Grans, Absolute 0.15 10*3/mm3      nRBC 0.0 /100 WBC     Lactic Acid, Plasma [[id number]] Collected: 08/16/24 1221    Specimen: Blood Updated: 08/16/24 1231             If labs were ordered, I have independently reviewed the results and considered them in the diagnosis and treatment plan for the patient    RADIOLOGY    XR Chest 1 View    Result Date: 8/16/2024  PROCEDURE: XR CHEST 1 VW-  HISTORY: SOA Triage Protocol  COMPARISON: 07/27/2016  FINDINGS:  Portable view of the chest demonstrates vague opacity  right midlung suspicious for pneumonia. Lungs are hypoinflated. There is mild left perihilar nodularity nonspecific. There is no evidence of effusion, pneumothorax or other significant pleural disease. The mediastinum is unremarkable.  The heart size is normal.      Impression: 1. Findings suspicious for right perihilar pneumonia. 2. Mild left perihilar nodularity. Nonemergent chest CT follow-up may be considered.    This report was signed and finalized on 8/16/2024 11:51 AM by Eros Guevara MD.        If I have ordered, I have independently reviewed the above noted radiographic studies.  Please see the radiologist dictation for the official interpretation    Medications given to patient in the ER    Medications   sodium chloride 0.9 % flush 10 mL (has no administration in time range)   sodium chloride 0.9 % flush 10 mL (has no administration in time range)   azithromycin (ZITHROMAX) 500 mg in sodium chloride 0.9 % 250 mL IVPB-VTB (has no administration in time range)   cefTRIAXone (ROCEPHIN) 1,000 mg in sodium chloride 0.9 % 100 mL IVPB-VTB (has no administration in time range)       AS OF 12:54 EDT VITALS:    BP - 95/71  HR - 79  TEMP - 98.1 °F (36.7 °C) (Oral)  O2 SATS - 93%         Shared Decision Making: After my consideration of the clinical presentation and laboratory/radiology studies obtained, I have discussed the findings with the patient/patient representative who is in agreement with the treatment plan and final disposition. Risks and benefits of discharge and/or observation admission were discussed.  Final disposition of the patient will be admitted.  Patient is requested to follow-up with primary care provider and specialist in 1 week following final discharge.    Discharge Medications Prescribed:  No new home medications were prescribed during this ER visit    Medical Decision Making  Bossman Luna is a 59 y.o. male who presents to the ER via EMS with staff discussing with EMS provider about  patient's weakness, shortness of breath.  EMS was told by group home staff that patient was tested positive for COVID and pneumonia 2 weeks ago has had worsening shortness of breath, generalized weakness.  Staff told EMS that this has started approximately 3 to 4 days ago and is progressively worsened over that interval.    DDX: includes but is not limited to: NSTEMI, STEMI, chest pain unspecified, CHF exacerbation, bilateral pneumonia, COVID, other    Amount and/or Complexity of Data Reviewed  Labs: ordered. Decision-making details documented in ED Course.     Details: I have personally reviewed and documented all results  Radiology: ordered. Decision-making details documented in ED Course.     Details: I have personally reviewed and documented all results  ECG/medicine tests: ordered.     Details: I have personally reviewed and documented all results  Discussion of management or test interpretation with external provider(s): Discussed assessment, treatment and plan with ER attending    Risk  Prescription drug management.  Risk Details: I have discussed with patient the finding of the test preformed today. Patient has been diagnosed with bilateral pneumonia, elevated troponin and will be admitted to the hospital.             Final diagnoses:   Pneumonia of both lungs due to infectious organism, unspecified part of lung   Elevated troponin       Please note that portions of this document were completed using voice recognition dictation software.       Aníbal Rios, APRN  08/16/24 1254      Electronically signed by Lazaro Bone MD at 08/16/24 1353       Flor Vega at 08/16/24 1143          JEANIE Chan notified of patients abnormal O2.    Electronically signed by Flor Vega at 08/16/24 1143       Vital Signs (last day)       Date/Time Temp Temp src Pulse Resp BP Patient Position SpO2    08/16/24 1259 -- -- 79 -- 90/58 -- 90    08/16/24 1229 -- -- 77 -- 89/52 -- 91    08/16/24 1159 -- -- 79  -- 95/71 -- 93    08/16/24 1157 -- -- -- -- -- -- 91    08/16/24 1149 -- -- 80 -- 100/66 -- 90    08/16/24 1122 98.1 (36.7) Oral 78 18 90/60 Sitting 95          Current Facility-Administered Medications   Medication Dose Route Frequency Provider Last Rate Last Admin    azithromycin (ZITHROMAX) 500 mg in sodium chloride 0.9 % 250 mL IVPB-VTB  500 mg Intravenous Once Aníbal Rios APRN        sodium chloride 0.9 % flush 10 mL  10 mL Intravenous PRN Lazaro Bone MD        sodium chloride 0.9 % flush 10 mL  10 mL Intravenous PRN Aníbal Rios APRN         Lab Results (last 24 hours)       Procedure Component Value Units Date/Time    High Sensitivity Troponin T 2Hr [[id number]]  (Abnormal) Collected: 08/16/24 1306    Specimen: Blood Updated: 08/16/24 1403     HS Troponin T 71 ng/L      Troponin T Delta -26 ng/L     Narrative:      High Sensitive Troponin T Reference Range:  <14.0 ng/L- Negative Female for AMI  <22.0 ng/L- Negative Male for AMI  >=14 - Abnormal Female indicating possible myocardial injury.  >=22 - Abnormal Male indicating possible myocardial injury.   Clinicians would have to utilize clinical acumen, EKG, Troponin, and serial changes to determine if it is an Acute Myocardial Infarction or myocardial injury due to an underlying chronic condition.         Blood Culture With AMRIK - Blood, Arm, Left [[id number]] Collected: 08/16/24 1240    Specimen: Blood from Arm, Left Updated: 08/16/24 1320    Blood Culture With AMRIK - Blood, Arm, Right [[id number]] Collected: 08/16/24 1250    Specimen: Blood from Arm, Right Updated: 08/16/24 1319    Lactic Acid, Plasma [[id number]]  (Normal) Collected: 08/16/24 1221    Specimen: Blood Updated: 08/16/24 1254     Lactate 1.1 mmol/L     Single High Sensitivity Troponin T [[id number]]  (Abnormal) Collected: 08/16/24 1113    Specimen: Blood Updated: 08/16/24 1159     HS Troponin T 97 ng/L     Narrative:      High Sensitive Troponin T Reference Range:  <14.0 ng/L-  Negative Female for AMI  <22.0 ng/L- Negative Male for AMI  >=14 - Abnormal Female indicating possible myocardial injury.  >=22 - Abnormal Male indicating possible myocardial injury.   Clinicians would have to utilize clinical acumen, EKG, Troponin, and serial changes to determine if it is an Acute Myocardial Infarction or myocardial injury due to an underlying chronic condition.         BNP [[id number]]  (Abnormal) Collected: 08/16/24 1113    Specimen: Blood Updated: 08/16/24 1148     proBNP 903.6 pg/mL     Narrative:      This assay is used as an aid in the diagnosis of individuals suspected of having heart failure. It can be used as an aid in the diagnosis of acute decompensated heart failure (ADHF) in patients presenting with signs and symptoms of ADHF to the emergency department (ED). In addition, NT-proBNP of <300 pg/mL indicates ADHF is not likely.    Age Range Result Interpretation  NT-proBNP Concentration (pg/mL:      <50             Positive            >450                   Gray                 300-450                    Negative             <300    50-75           Positive            >900                  Gray                300-900                  Negative            <300      >75             Positive            >1800                  Gray                300-1800                  Negative            <300    Comprehensive Metabolic Panel [[id number]]  (Abnormal) Collected: 08/16/24 1113    Specimen: Blood Updated: 08/16/24 1145     Glucose 113 mg/dL      BUN 15 mg/dL      Creatinine 0.99 mg/dL      Sodium 135 mmol/L      Potassium 4.0 mmol/L      Comment: Slight hemolysis detected by analyzer. Result may be falsely elevated.        Chloride 92 mmol/L      CO2 31.4 mmol/L      Calcium 9.2 mg/dL      Total Protein 6.6 g/dL      Albumin 3.5 g/dL      ALT (SGPT) 22 U/L      AST (SGOT) 33 U/L      Comment: Slight hemolysis detected by analyzer. Result may be falsely elevated.        Alkaline Phosphatase 53 U/L       Total Bilirubin 0.7 mg/dL      Globulin 3.1 gm/dL      A/G Ratio 1.1 g/dL      BUN/Creatinine Ratio 15.2     Anion Gap 11.6 mmol/L      eGFR 88.3 mL/min/1.73     Narrative:      GFR Normal >60  Chronic Kidney Disease <60  Kidney Failure <15      [place] Draw [[id number]] Collected: 08/16/24 1113    Specimen: Blood Updated: 08/16/24 1130    Narrative:      The following orders were created for panel order [place] Draw.  Procedure                               Abnormality         Status                     ---------                               -----------         ------                     Green Top (Gel)[[id number]]                                  Final result               Lavender Top[[id number]]                                     Final result               Gold Top - SST[[id number]]                                   Final result               Light Blue Top[[id number]]                                   Final result                 Please view results for these tests on the individual orders.    Green Top (Gel) [[id number]] Collected: 08/16/24 1113    Specimen: Blood Updated: 08/16/24 1130     Extra Tube Hold for add-ons.     Comment: Auto resulted.       Lavender Top [[id number]] Collected: 08/16/24 1113    Specimen: Blood Updated: 08/16/24 1130     Extra Tube hold for add-on     Comment: Auto resulted       Gold Top - SST [[id number]] Collected: 08/16/24 1113    Specimen: Blood Updated: 08/16/24 1130     Extra Tube Hold for add-ons.     Comment: Auto resulted.       Light Blue Top [[id number]] Collected: 08/16/24 1113    Specimen: Blood Updated: 08/16/24 1130     Extra Tube Hold for add-ons.     Comment: Auto resulted       CBC & Differential [[id number]]  (Abnormal) Collected: 08/16/24 1113    Specimen: Blood Updated: 08/16/24 1122    Narrative:      The following orders were created for panel order CBC & Differential.  Procedure                               Abnormality         Status                      ---------                               -----------         ------                     CBC Auto Differential[[id number]]        Abnormal            Final result                 Please view results for these tests on the individual orders.    CBC Auto Differential [[id number]]  (Abnormal) Collected: 08/16/24 1113    Specimen: Blood Updated: 08/16/24 1122     WBC 12.50 10*3/mm3      RBC 4.18 10*6/mm3      Hemoglobin 13.8 g/dL      Hematocrit 41.6 %      MCV 99.5 fL      MCH 33.0 pg      MCHC 33.2 g/dL      RDW 14.1 %      RDW-SD 51.8 fl      MPV 9.7 fL      Platelets 149 10*3/mm3      Neutrophil % 87.5 %      Lymphocyte % 6.5 %      Monocyte % 4.2 %      Eosinophil % 0.4 %      Basophil % 0.2 %      Immature Grans % 1.2 %      Neutrophils, Absolute 10.94 10*3/mm3      Lymphocytes, Absolute 0.81 10*3/mm3      Monocytes, Absolute 0.53 10*3/mm3      Eosinophils, Absolute 0.05 10*3/mm3      Basophils, Absolute 0.02 10*3/mm3      Immature Grans, Absolute 0.15 10*3/mm3      nRBC 0.0 /100 WBC           Physician Progress Notes (last 24 hours)  Notes from 08/15/24 1409 through 08/16/24 1409   No notes of this type exist for this encounter.

## 2024-08-16 NOTE — CASE MANAGEMENT/SOCIAL WORK
Discharge Planning Assessment  [facility]     Patient Name: Bossman Luna  MRN: [id number]  Today's Date: 8/16/2024    Admit Date: 8/16/2024    Plan: The patient is unable to answer questions due to impaired cognition.  His sister is able to assist with assessment.  He is a current patient of [facility] and gets his medications from [facility].  Sister elects to enroll patient in Meds to Bed.  The patient resides in a residential group home with caregivers provided by [facility]. Patient's sister, Sandy Sy ([phone]). She confirms that her mother (who is listed as patient's guardian) has dementia and is a resident in a nursing home. The sister is actually the patient's mother's POA. The patient's sister says she is aware that she needs to take steps to become guardian for the patient.  The patient uses a wheelchair and hospital bed.  There are no needs for additional DME or services at [place].  At the time of DC the patient will return to [facility] care givers.  Questions and concerns were addressed, IMM delivered at the time of this conversation with the patient's sister.  Will provide additional resources and information upon request.   Discharge Needs Assessment       Row Name 08/16/24 1345       Living Environment    People in Home facility resident    Unique Family Situation Lives at [facility] with caregivers    Current Living Arrangements group home    Duration at Residence >5 years    Potentially Unsafe Housing Conditions patient unable to answer    In the past 12 months has the electric, gas, oil, or water company threatened to shut off services in your home? Pt Unable    Primary Care Provided by other (see comments)  Staff at [facility]    Provides Primary Care For no one, unable/limited ability to care for self    Family Caregiver if Needed none    Quality of Family Relationships helpful;involved;supportive    Able to Return to Prior Arrangements yes        Resource/Environmental Concerns    Resource/Environmental Concerns none    Transportation Concerns none       Transportation Needs    In the past 12 months, has lack of transportation kept you from medical appointments or from getting medications? Pt Unable    In the past 12 months, has lack of transportation kept you from meetings, work, or from getting things needed for daily living? Pt Unable       Food Insecurity    Within the past 12 months, you worried that your food would run out before you got the money to buy more. Pt Unable    Within the past 12 months, the food you bought just didn't last and you didn't have money to get more. Pt Unable       Transition Planning    Patient/Family Anticipates Transition to home    Patient/Family Anticipated Services at Transition none    Transportation Anticipated other (see comments)  EMS       Discharge Needs Assessment    Readmission Within the Last 30 Days no previous admission in last 30 days    Current Outpatient/Agency/Support Group group home    Equipment Currently Used at Home wheelchair;hospital bed    Concerns to be Addressed discharge planning;decision-making    Concerns Comments Spoke with patient's sister, Sandy Sy ([phone]). She confirms that her mother (who is listed as patient's guardian) has dementia and is a resident in a nursing home. The sister is actually the patient's mother's POA. The patient's sister says she is aware that she needs to take steps to become guardian for the patient.    Anticipated Changes Related to Illness none    Equipment Needed After Discharge none    Discharge Facility/Level of Care Needs adult foster care/group home    Provided Post Acute Provider List? N/A    N/A Provider List Comment Patient to return to same group home; no new DME needs    Provided Post Acute Provider Quality & Resource List? N/A    N/A Quality & Resource List Comment Patient to return to same group home; no new DME needs    Patient's Choice  of Community Agency(s) Current resident at [facility]    Current Discharge Risk dependent with mobility/activities of daily living;cognitively impaired    Discharge Coordination/Progress Spoke with patient's sister, Sandy Sy ([phone]). She confirms that her mother (who is listed as patient's guardian) has dementia and is a resident in a nursing home. The sister is actually the patient's mother's POA. The patient's sister says she is aware that she needs to take steps to become guardian for the patient.                   Discharge Plan       Row Name 08/16/24 0758       Plan    Plan The patient is unable to answer questions due to impaired cognition.  His sister is able to assist with assessment.  He is a current patient of [facility] and gets his medications from [facility].  Sister elects to enroll patient in Meds to Bed.  The patient resides in a residential group home with caregivers provided by [facility]. Patient's sister, Sandy Sy ([phone]). She confirms that her mother (who is listed as patient's guardian) has dementia and is a resident in a nursing home. The sister is actually the patient's mother's POA. The patient's sister says she is aware that she needs to take steps to become guardian for the patient.  The patient uses a wheelchair and hospital bed.  There are no needs for additional DME or services at [place].  At the time of [place] the patient will return to [facility] care givers.  Questions and concerns were addressed, IMM delivered at the time of this conversation with the patient's sister.  Will provide additional resources and information upon request.    Patient/Family in Agreement with Plan yes    Provided Post Acute Provider List? N/A    Provided Post Acute Provider Quality & Resource List? N/A    Plan Comments Spoke with patient's sister, Sandy Sy ([phone]). She confirms that her mother (who is listed as patient's guardian) has dementia  and is a resident in a nursing home. The sister is actually the patient's mother's POA. The patient's sister says she is aware that she needs to take steps to become guardian for the patient.    Final Discharge Disposition Code 01 - home or self-care    Final Note Patient to DC back to [facility] and Services - Admitted Since 8/16/2024    No active coordination exists for this encounter.          Demographic Summary       Row Name 08/16/24 1342       General Information    Admission Type inpatient    Arrived From emergency department    Required Notices Provided Important Message from Medicare    Referral Source admission list    Reason for Consult discharge planning    Preferred Language English    General Information Comments The patient is unable to answer questions due to impaired cognitive functioning.  Patient's sister, Sandy Sy, is able to assist with assessment.       Contact Information    Contact Information Comments Spoke with patient's sister, Sandy Sy ([phone]).  She confirms that her mother (who is listed as patient's guardian) has dementia and is a resident in a nursing home.  The sister is actually the patient's mother's POA.  The patient's sister says she is aware that she needs to take steps to become guardian for the patient.                   Functional Status       Row Name 08/16/24 2757       Functional Status    Usual Activity Tolerance moderate    Current Activity Tolerance poor       Physical Activity    On average, how many days per week do you engage in moderate to strenuous exercise (like a brisk walk)? Pt Unable    On average, how many minutes do you engage in exercise at this level? Pt Unable       Assessment of Health Literacy    How often do you have someone help you read hospital materials? --  The patient is unable to answer questions due to impaired cognitive functioning. Patient's sister, Sandy Sy, is able to assist with  assessment.                   Psychosocial    No documentation.                  Abuse/Neglect       Row Name 08/16/24 1345       Personal Safety    Feels Unsafe at Home or Work/School unable to answer (comment required)    Feels Threatened by Someone unable to answer (comment required)    Does Anyone Try to Keep You From Having Contact with Others or Doing Things Outside Your Home? unable to answer (comment required)    Physical Signs of Abuse Present no                   Legal       Row Name 08/16/24 1345       Financial Resource Strain    How hard is it for you to pay for the very basics like food, housing, medical care, and heating? Pt Unable                   Substance Abuse       Row Name 08/16/24 1345       Substance Use    Substance Use Status never used                   Patient Forms       Row Name 08/16/24 1352       Patient Forms    Important Message from Medicare (IMM) Delivered    Delivered to Support person  Sister Hemphill    Method of delivery Telephone  [phone]                      Ximena Ruby RN

## 2024-08-16 NOTE — PROGRESS NOTES
"Pharmacy Consult - Enoxaparin Dosing  Bossman KELLEN Luna is a 59 y.o. male who has been consulted to dose Enoxaparin for VTE PPX.     Allergies  Patient has no known allergies.    Relevant clinical data and objective history reviewed:   [Ht: 157.5 cm (62\"); Wt: 56.2 kg (124 lb)]  Body mass index is 22.68 kg/m².  Estimated Creatinine Clearance: 64.7 mL/min (by C-G formula based on SCr of 0.99 mg/dL).  Results from last 7 days   Lab Units 08/16/24  1113   HEMOGLOBIN g/dL 13.8   HEMATOCRIT % 41.6   PLATELETS 10*3/mm3 149   CREATININE mg/dL 0.99       Asessment/Plan  Initiate Enoxaparin 40mg SQ every 24 hours  Pharmacy will monitor Mr. Luna's renal function and clinical status and adjust the Enoxaparin dose and/or frequency as needed.    Thanks,   Any Carmen, PharmD  8/16/2024  15:31 EDT      "

## 2024-08-17 LAB
ANION GAP SERPL CALCULATED.3IONS-SCNC: 10.3 MMOL/L (ref 5–15)
BUN SERPL-MCNC: 9 MG/DL (ref 6–20)
BUN/CREAT SERPL: 19.6 (ref 7–25)
CALCIUM SPEC-SCNC: 8.6 MG/DL (ref 8.6–10.5)
CHLORIDE SERPL-SCNC: 96 MMOL/L (ref 98–107)
CO2 SERPL-SCNC: 29.7 MMOL/L (ref 22–29)
CREAT SERPL-MCNC: 0.46 MG/DL (ref 0.76–1.27)
DEPRECATED RDW RBC AUTO: 51.1 FL (ref 37–54)
EGFRCR SERPLBLD CKD-EPI 2021: 121.2 ML/MIN/1.73
ERYTHROCYTE [DISTWIDTH] IN BLOOD BY AUTOMATED COUNT: 13.9 % (ref 12.3–15.4)
GLUCOSE SERPL-MCNC: 85 MG/DL (ref 65–99)
HCT VFR BLD AUTO: 37.5 % (ref 37.5–51)
HGB BLD-MCNC: 12.1 G/DL (ref 13–17.7)
MCH RBC QN AUTO: 32 PG (ref 26.6–33)
MCHC RBC AUTO-ENTMCNC: 32.3 G/DL (ref 31.5–35.7)
MCV RBC AUTO: 99.2 FL (ref 79–97)
PLATELET # BLD AUTO: 141 10*3/MM3 (ref 140–450)
PMV BLD AUTO: 9.2 FL (ref 6–12)
POTASSIUM SERPL-SCNC: 3.7 MMOL/L (ref 3.5–5.2)
RBC # BLD AUTO: 3.78 10*6/MM3 (ref 4.14–5.8)
SODIUM SERPL-SCNC: 136 MMOL/L (ref 136–145)
WBC NRBC COR # BLD AUTO: 8.9 10*3/MM3 (ref 3.4–10.8)

## 2024-08-17 PROCEDURE — 99232 SBSQ HOSP IP/OBS MODERATE 35: CPT | Performed by: INTERNAL MEDICINE

## 2024-08-17 PROCEDURE — 25010000002 CEFTRIAXONE PER 250 MG: Performed by: INTERNAL MEDICINE

## 2024-08-17 PROCEDURE — 25010000002 ENOXAPARIN PER 10 MG: Performed by: INTERNAL MEDICINE

## 2024-08-17 PROCEDURE — 80048 BASIC METABOLIC PNL TOTAL CA: CPT | Performed by: INTERNAL MEDICINE

## 2024-08-17 PROCEDURE — 85027 COMPLETE CBC AUTOMATED: CPT | Performed by: INTERNAL MEDICINE

## 2024-08-17 RX ADMIN — BUDESONIDE 9 MG: 3 CAPSULE, GELATIN COATED ORAL at 09:54

## 2024-08-17 RX ADMIN — ACETAMINOPHEN 650 MG: 325 TABLET, FILM COATED ORAL at 10:07

## 2024-08-17 RX ADMIN — CLONAZEPAM 0.5 MG: 0.5 TABLET ORAL at 09:55

## 2024-08-17 RX ADMIN — RISPERIDONE 3 MG: 1 TABLET, FILM COATED ORAL at 20:28

## 2024-08-17 RX ADMIN — CLONAZEPAM 0.75 MG: 0.5 TABLET ORAL at 17:55

## 2024-08-17 RX ADMIN — ENOXAPARIN SODIUM 40 MG: 100 INJECTION SUBCUTANEOUS at 09:53

## 2024-08-17 RX ADMIN — SENNOSIDES AND DOCUSATE SODIUM 2 TABLET: 50; 8.6 TABLET ORAL at 09:54

## 2024-08-17 RX ADMIN — Medication 10 ML: at 09:58

## 2024-08-17 RX ADMIN — CLONAZEPAM 0.75 MG: 0.5 TABLET ORAL at 13:58

## 2024-08-17 RX ADMIN — METOPROLOL SUCCINATE 12.5 MG: 25 TABLET, EXTENDED RELEASE ORAL at 09:54

## 2024-08-17 RX ADMIN — CLONAZEPAM 0.75 MG: 0.5 TABLET ORAL at 20:28

## 2024-08-17 RX ADMIN — FLUOXETINE HYDROCHLORIDE 20 MG: 20 CAPSULE ORAL at 09:54

## 2024-08-17 RX ADMIN — Medication 1 CAPSULE: at 09:55

## 2024-08-17 RX ADMIN — Medication 1 CAPSULE: at 20:28

## 2024-08-17 RX ADMIN — RISPERIDONE 2 MG: 1 TABLET, FILM COATED ORAL at 06:06

## 2024-08-17 RX ADMIN — ZOLPIDEM TARTRATE 5 MG: 5 TABLET ORAL at 20:28

## 2024-08-17 RX ADMIN — AZITHROMYCIN DIHYDRATE 250 MG: 250 TABLET ORAL at 10:07

## 2024-08-17 RX ADMIN — PANTOPRAZOLE SODIUM 40 MG: 40 TABLET, DELAYED RELEASE ORAL at 06:06

## 2024-08-17 RX ADMIN — LEVOTHYROXINE SODIUM 88 MCG: 88 TABLET ORAL at 09:54

## 2024-08-17 RX ADMIN — CEFTRIAXONE SODIUM 1000 MG: 1 INJECTION, POWDER, FOR SOLUTION INTRAMUSCULAR; INTRAVENOUS at 10:00

## 2024-08-17 NOTE — PLAN OF CARE
Goal Outcome Evaluation:  Plan of Care Reviewed With: patient        Progress: improving  Outcome Evaluation: VSS. Pt on 4 liters of oxygen currently because he is resting. Pt on 2 liters NC while awake. Pt having spontaneous voids frequently and pt did have a large BM today.

## 2024-08-17 NOTE — PLAN OF CARE
Goal Outcome Evaluation:  Plan of Care Reviewed With: patient        Progress: improving  Outcome Evaluation: Patient maintained stable VS throughout the shift. Patient is cooperative with treatments. Was not straight cathed due to spontaneous voiding. No significant events or concerns at this time, continuing to monitor.

## 2024-08-17 NOTE — PROGRESS NOTES
[facility]    PROGRESS NOTE    Name:  Bossman Luna   Age:  59 y.o.  Sex:  male  :  1965  MRN:  [id number]   Visit Number:  [id number]  Admission Date:  2024  Date Of Service:  24  Primary Care Physician:  System, Provider Not In     LOS: 1 day :    Chief Complaint:      Follow-up of pneumonia.    Subjective:    Bossman Luna was seen and examined this morning.  He is currently lying down in the bed and is comfortable at rest.  He is on 1 L of nasal cannula oxygen.  He is currently being fed his breakfast and does not seem to have any swallowing difficulty.  No fevers.  Unfortunately he pulled his IV line and his ceftriaxone has been switched to Keflex.    Hospital Course:    Bossman Luna is a 59-year-old pleasant male, resident of [facility] with history of intellectual disability, BPH with urinary retention requiring In-N-Out catheterization 3 times a day, GERD, hypertension, recent history of COVID-19, 2 weeks ago status post Paxlovid was brought to the emergency room by EMS with symptoms of generalized weakness, shortness of breath and cough.  According to the caregiver, patient is usually very alert and active including walking and feeding himself.  However, in the last couple of days, he has had progressive worsening of generalized weakness, fatigue and was not his usual self.     In the emergency room, he was afebrile and hemodynamically stable saturating at 95% on 1 L of nasal cannula oxygen.  Initial blood pressure was 90/60.  Initial troponin was 97 with repeat at 71.  CMP was unremarkable except for a sodium of 135.  Lactic acid was normal.  CBC showed a hemoglobin of 14 and WBC of 12.5.  Blood cultures were drawn in the emergency room.  Chest x-ray showed findings suspicious for right perihilar pneumonia and mild left hilar nodularity.  Patient was given ceftriaxone and azithromycin in the emergency room and was  subsequently admitted to the medical floor with telemetry for management of post COVID-19 pneumonia.    Review of Systems:     All systems were reviewed and negative except as mentioned in subjective, assessment and plan.    Vital Signs:    Temp:  [97.7 °F (36.5 °C)-98.3 °F (36.8 °C)] 97.8 °F (36.6 °C)  Heart Rate:  [76-86] 76  Resp:  [16-18] 18  BP: ()/(52-89) 108/89    Intake and output:    No intake/output data recorded.  I/O this shift:  In: 660 [P.O.:660]  Out: -     Physical Examination:    General Appearance:  Alert and cooperative.  Pleasantly confused.   Head:  Atraumatic and normocephalic.   Eyes: Conjunctivae and sclerae normal, no icterus. No pallor.   Throat: No oral lesions, no thrush, oral mucosa moist.   Neck: Supple, trachea midline, no thyromegaly.   Lungs:   Breath sounds heard bilaterally equally.  No wheezing or crackles. No Pleural rub or bronchial breathing.   Heart:  Normal S1 and S2, no murmur, no gallop, no rub. No JVD.   Abdomen:   Normal bowel sounds, no masses, no organomegaly. Soft, nontender, nondistended, no rebound tenderness.   Extremities: Supple, no edema, no cyanosis, no clubbing.   Skin: No bleeding or rash.   Neurologic: Alert and oriented x 3. No facial asymmetry. Moves all four limbs. No tremors.    Laboratory results:    Results from last 7 days   Lab Units 08/17/24  0538 08/16/24  1113   SODIUM mmol/L 136 135*   POTASSIUM mmol/L 3.7 4.0   CHLORIDE mmol/L 96* 92*   CO2 mmol/L 29.7* 31.4*   BUN mg/dL 9 15   CREATININE mg/dL 0.46* 0.99   CALCIUM mg/dL 8.6 9.2   BILIRUBIN mg/dL  --  0.7   ALK PHOS U/L  --  53   ALT (SGPT) U/L  --  22   AST (SGOT) U/L  --  33   GLUCOSE mg/dL 85 113*     Results from last 7 days   Lab Units 08/17/24  0538 08/16/24  1113   WBC 10*3/mm3 8.90 12.50*   HEMOGLOBIN g/dL 12.1* 13.8   HEMATOCRIT % 37.5 41.6   PLATELETS 10*3/mm3 141 149         Results from last 7 days   Lab Units 08/16/24  1306 08/16/24  1113   CK TOTAL U/L 173  --    HSTROP T ng/L  71* 97*     Results from last 7 days   Lab Units 08/16/24  1250 08/16/24  1240   BLOODCX  No growth at less than 24 hours No growth at less than 24 hours     I have reviewed the patient's laboratory results.    Radiology results:    XR Chest 1 View    Result Date: 8/16/2024  PROCEDURE: XR CHEST 1 VW-  HISTORY: SOA Triage Protocol  COMPARISON: 07/27/2016  FINDINGS:  Portable view of the chest demonstrates vague opacity right midlung suspicious for pneumonia. Lungs are hypoinflated. There is mild left perihilar nodularity nonspecific. There is no evidence of effusion, pneumothorax or other significant pleural disease. The mediastinum is unremarkable.  The heart size is normal.      Impression: 1. Findings suspicious for right perihilar pneumonia. 2. Mild left perihilar nodularity. Nonemergent chest CT follow-up may be considered.    This report was signed and finalized on 8/16/2024 11:51 AM by Eros Guevara MD.     I have reviewed the patient's radiology reports.    Medication Review:     I have reviewed the patient's active and prn medications.     Problem List:      Bacterial pneumonia    COVID-19 virus infection    Intellectual disability    Assessment:    Bilateral perihilar pneumonia, unable to classify further, POA.  Recent COVID-19 infection status post Paxlovid.  BPH with chronic urinary retention.  Essential hypertension.  GERD.  Intellectual disability.    Plan:    Bacterial pneumonia with recent COVID-19 infection.  - Received 1 dose of ceftriaxone yesterday but while receiving the second dose, he ripped his IV off.  - We will switch him to oral Keflex and oral azithromycin.  - He is on minimal oxygen at 1 L.  - I will hold off on steroid therapy at this time unless his respiratory failure worsens.  - He was seen by speech therapy who recommended soft diet with thin liquids as tolerated.     BPH/essential hypertension.  - Continue home medications.  - In-N-Out catheterization 3 times daily.    Discussed  with nursing staff at the bedside.    I have reviewed the copied text and it is accurate as of 08/17/24      DVT Prophylaxis: Enoxaparin  Code Status: Full  Diet: Regular  Discharge Plan: Home tomorrow.    Ranulfo Barnett MD  08/17/24  12:28 EDT    Dictated utilizing Dragon dictation.

## 2024-08-18 ENCOUNTER — APPOINTMENT (OUTPATIENT)
Dept: GENERAL RADIOLOGY | Facility: HOSPITAL | Age: 59
DRG: 194 | End: 2024-08-18
Payer: MEDICARE

## 2024-08-18 VITALS
HEART RATE: 81 BPM | BODY MASS INDEX: 26.61 KG/M2 | TEMPERATURE: 98.4 F | WEIGHT: 144.62 LBS | HEIGHT: 62 IN | OXYGEN SATURATION: 93 % | SYSTOLIC BLOOD PRESSURE: 109 MMHG | RESPIRATION RATE: 16 BRPM | DIASTOLIC BLOOD PRESSURE: 72 MMHG

## 2024-08-18 PROCEDURE — 25010000002 ENOXAPARIN PER 10 MG: Performed by: INTERNAL MEDICINE

## 2024-08-18 PROCEDURE — 99238 HOSP IP/OBS DSCHRG MGMT 30/<: CPT | Performed by: INTERNAL MEDICINE

## 2024-08-18 PROCEDURE — 71045 X-RAY EXAM CHEST 1 VIEW: CPT

## 2024-08-18 RX ORDER — CEPHALEXIN 500 MG/1
500 CAPSULE ORAL EVERY 8 HOURS SCHEDULED
Qty: 11 CAPSULE | Refills: 0 | Status: SHIPPED | OUTPATIENT
Start: 2024-08-18 | End: 2024-08-22

## 2024-08-18 RX ORDER — AZITHROMYCIN 250 MG/1
TABLET, FILM COATED ORAL
Qty: 2 TABLET | Refills: 0 | Status: SHIPPED | OUTPATIENT
Start: 2024-08-19 | End: 2024-08-18

## 2024-08-18 RX ORDER — AZITHROMYCIN 250 MG/1
250 TABLET, FILM COATED ORAL DAILY
Qty: 2 TABLET | Refills: 0 | Status: SHIPPED | OUTPATIENT
Start: 2024-08-18

## 2024-08-18 RX ADMIN — LEVOTHYROXINE SODIUM 88 MCG: 88 TABLET ORAL at 08:31

## 2024-08-18 RX ADMIN — Medication 1 CAPSULE: at 08:31

## 2024-08-18 RX ADMIN — CEPHALEXIN 500 MG: 250 CAPSULE ORAL at 05:58

## 2024-08-18 RX ADMIN — FLUOXETINE HYDROCHLORIDE 20 MG: 20 CAPSULE ORAL at 08:31

## 2024-08-18 RX ADMIN — BUDESONIDE 9 MG: 3 CAPSULE, GELATIN COATED ORAL at 08:30

## 2024-08-18 RX ADMIN — METOPROLOL SUCCINATE 12.5 MG: 25 TABLET, EXTENDED RELEASE ORAL at 08:30

## 2024-08-18 RX ADMIN — AZITHROMYCIN DIHYDRATE 250 MG: 250 TABLET ORAL at 08:31

## 2024-08-18 RX ADMIN — ENOXAPARIN SODIUM 40 MG: 100 INJECTION SUBCUTANEOUS at 08:31

## 2024-08-18 RX ADMIN — RISPERIDONE 2 MG: 1 TABLET, FILM COATED ORAL at 06:00

## 2024-08-18 RX ADMIN — PANTOPRAZOLE SODIUM 40 MG: 40 TABLET, DELAYED RELEASE ORAL at 05:58

## 2024-08-18 RX ADMIN — CLONAZEPAM 0.75 MG: 0.5 TABLET ORAL at 08:30

## 2024-08-18 NOTE — DISCHARGE SUMMARY
[facility]   DISCHARGE SUMMARY      Name:  Bossman Luna   Age:  59 y.o.  Sex:  male  :  1965  MRN:  [id number]   Visit Number:  [id number]    Admission Date:  2024  Date of Discharge:  2024  Primary Care Physician:  System, Provider Not In    Important issues to note:    1.  Patient with recent diagnosis of COVID was admitted with shortness of breath and low oxygen levels.  Patient however was saturating in the 90s on admission and only required 1 to 2 L of oxygen intermittently.  He did not have any fever or leukocytosis.  He was continued on Rocephin and azithromycin and is currently being discharged back with Keflex and azithromycin to complete therapy.  2.  Patient did have elevated troponin levels that trended down.  EKG showed T inversions in anterior leads.  Patient denied any chest pain and there was no clinical suspicion for acute coronary syndrome.  He may benefit from outpatient follow-up with cardiology if family wants to pursue evaluation for coronary artery disease.  3.  Patient was seen by speech therapy and recommended soft diet with thin liquids as tolerated.  4.  Follow-up with primary care provider in 1 week.    Discharge Diagnoses:     Bilateral perihilar pneumonia, unable to classify further, POA, improved.  Hypoxia, POA, resolved.  Recent COVID-19 infection status post Paxlovid.  Demand ischemia secondary to #1, POA.  BPH with chronic urinary retention.  Essential hypertension.  GERD.  Intellectual disability.    Problem List:     Active Hospital Problems    Diagnosis  POA    **Bacterial pneumonia [J15.9]  Yes    COVID-19 virus infection [U07.1]  Yes     Priority: Medium    Intellectual disability [F79]  Yes      Resolved Hospital Problems   No resolved problems to display.     Presenting Problem:    Chief Complaint   Patient presents with    Weakness - Generalized    Shortness of Breath      Consults:     Consulting Physician(s)                      None              Procedures Performed:    None.    History of presenting illness/Hospital Course:    Bossman Luna is a 59-year-old pleasant male, resident of [facility] with history of intellectual disability, BPH with urinary retention requiring In-N-Out catheterization 3 times a day, GERD, hypertension, recent history of COVID-19, 2 weeks ago status post Paxlovid was brought to the emergency room by EMS with symptoms of generalized weakness, shortness of breath and cough.  According to the caregiver, patient is usually very alert and active including walking and feeding himself.  However, in the last couple of days, he has had progressive worsening of generalized weakness, fatigue and was not his usual self.     In the emergency room, he was afebrile and hemodynamically stable saturating at 95% on 1 L of nasal cannula oxygen.  Initial blood pressure was 90/60.  Initial troponin was 97 with repeat at 71.  CMP was unremarkable except for a sodium of 135.  Lactic acid was normal.  CBC showed a hemoglobin of 14 and WBC of 12.5.  Blood cultures were drawn in the emergency room.  Chest x-ray showed findings suspicious for right perihilar pneumonia and mild left hilar nodularity.  Patient was given ceftriaxone and azithromycin in the emergency room and was subsequently admitted to the medical floor with telemetry for management of post COVID-19 pneumonia.    Patient remained afebrile and hemodynamically stable without any significant hypoxia.  He was continued on ceftriaxone and azithromycin and he was switched to Keflex.  Patient was seen by speech therapy who recommended soft diet with thin liquids as tolerated.  Patient did have trending down troponins and did not have any chest pain.  At this time no suspicion for acute coronary syndrome.  If family wants to pursue, he may be referred to cardiology as an outpatient for stress test to rule out coronary artery disease.  I tried to  call the patient's caregivers but unfortunately there was no response.  I subsequently called the patient's sister Sandy and discussed the patient's discharge plan with her.  She states that the patient's guardian is his mother who apparently has dementia and is in a nursing home.  Patient may need change of his guardian and/or power of .  I am uncertain why the patient is on budesonide.  The sister does not know.    Vital Signs:    Temp:  [97 °F (36.1 °C)-98.4 °F (36.9 °C)] 98.4 °F (36.9 °C)  Heart Rate:  [73-92] 77  Resp:  [16-20] 16  BP: (109-118)/(65-83) 109/72    Physical Exam:    General Appearance:  Alert and cooperative.    Head:  Atraumatic and normocephalic.   Eyes: Conjunctivae and sclerae normal, no icterus. No pallor.   Ears:  Ears with no abnormalities noted.   Throat: No oral lesions, no thrush, oral mucosa moist.   Neck: Supple, trachea midline, no thyromegaly.   Back:   No kyphoscoliosis present. No tenderness to palpation.   Lungs:   Breath sounds heard bilaterally equally.  No crackles or wheezing. No Pleural rub or bronchial breathing.   Heart:  Normal S1 and S2, no murmur, no gallop, no rub. No JVD.   Abdomen:   Normal bowel sounds, no masses, no organomegaly. Soft, nontender, nondistended, no rebound tenderness.   Extremities: Supple, no edema, no cyanosis, no clubbing.   Pulses: Pulses palpable bilaterally.   Skin: No bleeding or rash.   Neurologic: Alert and oriented x 3. No facial asymmetry. Moves all four limbs. No tremors.     Pertinent Lab Results:     Results from last 7 days   Lab Units 08/17/24  0538 08/16/24  1113   SODIUM mmol/L 136 135*   POTASSIUM mmol/L 3.7 4.0   CHLORIDE mmol/L 96* 92*   CO2 mmol/L 29.7* 31.4*   BUN mg/dL 9 15   CREATININE mg/dL 0.46* 0.99   CALCIUM mg/dL 8.6 9.2   BILIRUBIN mg/dL  --  0.7   ALK PHOS U/L  --  53   ALT (SGPT) U/L  --  22   AST (SGOT) U/L  --  33   GLUCOSE mg/dL 85 113*     Results from last 7 days   Lab Units 08/17/24  0538 08/16/24  1111    WBC 10*3/mm3 8.90 12.50*   HEMOGLOBIN g/dL 12.1* 13.8   HEMATOCRIT % 37.5 41.6   PLATELETS 10*3/mm3 141 149         Results from last 7 days   Lab Units 08/16/24  1306 08/16/24  1113   CK TOTAL U/L 173  --    HSTROP T ng/L 71* 97*     Results from last 7 days   Lab Units 08/16/24  1113   PROBNP pg/mL 903.6*       Results from last 7 days   Lab Units 08/16/24  1250 08/16/24  1240   BLOODCX  No growth at 24 hours No growth at 24 hours     Pertinent Radiology Results:    Imaging Results (All)       Procedure Component Value Units Date/Time    XR Chest 1 View [[id number]] Collected: 08/18/24 0955     Updated: 08/18/24 0958    Narrative:      PROCEDURE: XR CHEST 1 VW-     HISTORY: Follow-up of COVID-pneumonia; J18.9-Pneumonia, unspecified  organism; R79.89-Other specified abnormal findings of blood chemistry     COMPARISON: August 16, 2024.     FINDINGS: The heart is normal in size. The mediastinum is unremarkable.  Increasing bilateral perihilar interstitial markings, this may represent  interstitial infiltrates or edema. There is no pneumothorax.  There are  no acute osseous abnormalities.       Impression:      Increasing bilateral perihilar interstitial markings, this  may represent interstitial infiltrates or edema.     Continued followup is recommended.     This report was signed and finalized on 8/18/2024 9:55 AM by Jarret Wheeler DO.       XR Chest 1 View [[id number]] Collected: 08/16/24 1148     Updated: 08/16/24 1153    Narrative:      PROCEDURE: XR CHEST 1 VW-     HISTORY: [facility] Triage Protocol     COMPARISON: 07/27/2016     FINDINGS:  Portable view of the chest demonstrates vague opacity right  midlung suspicious for pneumonia. Lungs are hypoinflated. There is mild  left perihilar nodularity nonspecific. There is no evidence of effusion,  pneumothorax or other significant pleural disease. The mediastinum is  unremarkable.     The heart size is normal.       Impression:      1. Findings suspicious for right  perihilar pneumonia.  2. Mild left perihilar nodularity. Nonemergent chest CT follow-up may be  considered.     This report was signed and finalized on 8/16/2024 11:51 AM by Eros Guevara MD.        Condition on Discharge:      Stable.    Code status during the hospital stay:    Code Status and Medical Interventions: CPR (Attempt to Resuscitate); Full Support   Ordered at: 08/16/24 1524     Code Status (Patient has no pulse and is not breathing):    CPR (Attempt to Resuscitate)     Medical Interventions (Patient has pulse or is breathing):    Full Support     Discharge Disposition:    Home or Self Care    Discharge Medications:       Discharge Medications        New Medications        Instructions Start Date   azithromycin 250 MG tablet  Commonly known as: ZITHROMAX   250 mg, Oral, Daily      cephalexin 500 MG capsule  Commonly known as: KEFLEX   500 mg, Oral, Every 8 Hours Scheduled             Changes to Medications        Instructions Start Date   melatonin 3 MG tablet  What changed: Another medication with the same name was removed. Continue taking this medication, and follow the directions you see here.   6 mg, Oral, Nightly      tamsulosin 0.4 MG capsule 24 hr capsule  Commonly known as: FLOMAX  What changed: See the new instructions.   TAKE ONE CAPSULE BY MOUTH NIGHTLY AT BEDTIME             Continue These Medications        Instructions Start Date   acetaminophen 500 MG tablet  Commonly known as: TYLENOL   1,000 mg, Oral, 3 Times Daily, 0800, 1200, 2000  Take two tablets three times daily while awake and two tablets as needed.      acetaminophen 325 MG tablet  Commonly known as: TYLENOL   650 mg, Oral, Every 6 Hours PRN      Ambien 5 MG tablet  Generic drug: zolpidem   5 mg, Oral, Nightly PRN      BOOST SMOOTHIE PO   1 can, Oral, 2 Times Daily PRN      Budesonide 3 MG 24 hr capsule  Commonly known as: ENTOCORT EC   9 mg, Oral, Daily      Ciclodan 8 % solution  Generic drug: ciclopirox   1 application ,  Topical, Nightly, Apply to affected toe nails as directed       clonazePAM 0.5 MG tablet  Commonly known as: KlonoPIN   0.75 mg, Oral, 4 Times Daily, Taking 1 1/2 tablets 0800, 1100, 1400, nightly      docusate sodium 250 MG capsule  Commonly known as: COLACE   250 mg, Oral, Nightly      esomeprazole 40 MG capsule  Commonly known as: nexIUM   40 mg, Oral, Every Morning Before Breakfast      FLUoxetine 20 MG capsule  Commonly known as: PROzac   20 mg, Oral, Daily      fluticasone 50 MCG/ACT nasal spray  Commonly known as: FLONASE   2 sprays, Nasal, Daily      levothyroxine 88 MCG tablet  Commonly known as: SYNTHROID, LEVOTHROID   88 mcg, Oral, Daily      losartan 25 MG tablet  Commonly known as: COZAAR   25 mg, Oral, Nightly      metoprolol succinate XL 25 MG 24 hr tablet  Commonly known as: TOPROL-XL   25 mg, Oral, Daily      multivitamin tablet  Generic drug: multivitamin   1 tablet, Oral, Daily      Oyster Shell Calcium Plus D 500-200 MG-UNIT tablet per tablet  Generic drug: Calcium Carb-Cholecalciferol   1 tablet, Oral, Daily      potassium chloride 10 MEQ CR tablet  Commonly known as: KLOR-CON M10   10 mEq, Oral, Daily      risperiDONE 2 MG tablet  Commonly known as: risperDAL   2 mg, Oral, Every Morning      risperiDONE 3 MG tablet  Commonly known as: risperDAL   3 mg, Oral, Nightly             Stop These Medications      pantoprazole 40 MG EC tablet  Commonly known as: PROTONIX            Discharge Diet:     Diet Instructions       Diet: Regular/House Diet; Soft to Chew (NDD 3); Chopped Meat; Thin (IDDSI 0)      Discharge Diet: Regular/House Diet    Texture: Soft to Chew (NDD 3)    Soft to Chew: Chopped Meat    Fluid Consistency: Thin (IDDSI 0)          Activity at Discharge:     Activity Instructions       Activity as Tolerated            Follow-up Appointments:     Follow-up Information       Akilah Quick APRN Follow up in 1 week(s).    Specialty: Family Medicine  Contact information:  [address]  [facility] [address]  [facility] [address]  [phone]                           Test Results Pending at Discharge:    Pending Labs       Order Current Status    Blood Culture With AMRIK - Blood, Arm, Left Preliminary result    Blood Culture With AMRIK - Blood, Arm, Right Preliminary result             Ranulfo Barnett MD  08/18/24  10:57 EDT    Time: I spent 25 minutes on this discharge activity which included: face-to-face encounter with the patient, reviewing the data in the system, coordination of the care with the nursing staff as well as consultants, documentation, and entering orders.     Dictated utilizing Dragon dictation.

## 2024-08-18 NOTE — PLAN OF CARE
Goal Outcome Evaluation:              Outcome Evaluation: VSS. No acute events during this shift.

## 2024-08-18 NOTE — CASE MANAGEMENT/SOCIAL WORK
Case Management Discharge Note      Final Note: Patient to DC back to [facility]    Provided Post Acute Provider List?: N/A  N/A Provider List Comment: Patient to return to same group home; no new DME needs  Provided Post Acute Provider Quality & Resource List?: N/A  N/A Quality & Resource List Comment: Patient to return to same group home; no new DME needs    Selected Continued Care - Admitted Since 8/16/2024       Destination    No services have been selected for the patient.                Durable Medical Equipment    No services have been selected for the patient.                Dialysis/Infusion    No services have been selected for the patient.                Home Medical Care    No services have been selected for the patient.                Therapy    No services have been selected for the patient.                Community Resources    No services have been selected for the patient.                Community & DME    No services have been selected for the patient.                    Transportation Services  Ambulance: [facility]    Final Discharge Disposition Code: 01 - home or self-care

## 2024-08-18 NOTE — CASE MANAGEMENT/SOCIAL WORK
Continued Stay Note  [facility]     Patient Name: Bossman Luna  MRN: [id number]  Today's Date: 8/18/2024    Admit Date: 8/16/2024    Plan: The patient is unable to answer questions due to impaired cognition.  His sister is able to assist with assessment.  He is a current patient of [facility] and gets his medications from [employer].  Sister elects to enroll patient in Meds to Bed.  The patient resides in a residential group home with caregivers provided by [facility]. Patient's sister, Sandy Sy ([phone]). She confirms that her mother (who is listed as patient's guardian) has dementia and is a resident in a nursing home. The sister is actually the patient's mother's POA. The patient's sister says she is aware that she needs to take steps to become guardian for the patient.  The patient uses a wheelchair and hospital bed.  There are no needs for additional DME or services at DC.  At the time of DC the patient will return to [facility] care givers.  Questions and concerns were addressed, IMM delivered at the time of this conversation with the patient's sister.  Will provide additional resources and information upon request.   Discharge Plan       Row Name 08/18/24 1106       Plan    Plan Comments Spoke to [facility] Pt may return  there will need EMS .Nursing to call report Pt physical address is [address] [place] [address]                   Discharge Codes    No documentation.                 Expected Discharge Date and Time       Expected Discharge Date Expected Discharge Time    Aug 18, 2024               Khushbu Darnell RN

## 2024-08-21 LAB
BACTERIA SPEC AEROBE CULT: NORMAL
BACTERIA SPEC AEROBE CULT: NORMAL

## 2024-11-14 ENCOUNTER — LAB REQUISITION (OUTPATIENT)
Dept: LAB | Facility: HOSPITAL | Age: 59
End: 2024-11-14
Payer: MEDICARE

## 2024-11-14 DIAGNOSIS — N39.0 URINARY TRACT INFECTION, SITE NOT SPECIFIED: ICD-10-CM

## 2024-11-14 PROCEDURE — 87086 URINE CULTURE/COLONY COUNT: CPT | Performed by: UROLOGY

## 2024-11-14 PROCEDURE — 87088 URINE BACTERIA CULTURE: CPT | Performed by: UROLOGY

## 2024-11-14 PROCEDURE — 87186 SC STD MICRODIL/AGAR DIL: CPT | Performed by: UROLOGY

## 2024-11-16 LAB — BACTERIA SPEC AEROBE CULT: ABNORMAL

## 2025-05-29 NOTE — PROGRESS NOTES
Cardiology Established Patient Note     Name: Bossman Luna  :   1965  PCP: Akilah Quick, APRN  Date:   2025  Department: [facility] [place] [facility] CARDIOLOGY  [address]  [facility] [address]  Fax [phone]  Phone [phone]    Chief Complaint:    Follow-up:  1.  PAD   Arterial lower extremity duplex 2022 mild PAD.  2.  Hyperlipidemia  3.  Shortness of breath             -Abnormal EKG 24- ST depression ad t wave inversions R precordial leads   2D echo 2024 EF 50 to 55%  4.  AI         -ECHO 2024-Mild AI  .4  Hypertension benign essential    Subjective     History of Present Illness  Bossman Luna is a 60 y.o. male who presents today for routine 6-month follow-up.      Current Outpatient Medications   Medication Instructions    acetaminophen (TYLENOL) 650 mg, Oral, Every 6 Hours PRN    acetaminophen-codeine (TYLENOL with CODEINE #3) 300-30 MG per tablet 1 tablet, Every 4 Hours PRN    Ambien 10 mg, Nightly PRN    azithromycin (ZITHROMAX) 250 mg, Oral, Daily    baclofen (LIORESAL) 5 mg, Daily    Budesonide (ENTOCORT EC) 9 mg, Daily    Calcium Carb-Cholecalciferol (Oyster Shell Calcium Plus D) 500-200 MG-UNIT tablet 1 tablet, Daily    ciclopirox (Ciclodan) 8 % solution Nightly    clonazePAM (KLONOPIN) 0.75 mg, 4 Times Daily    docusate sodium (COLACE) 250 mg, Nightly    esomeprazole (NEXIUM) 40 mg, Every Morning Before Breakfast    FLUoxetine (PROZAC) 20 mg, Daily    fluticasone (FLONASE) 50 MCG/ACT nasal spray 2 sprays, Daily    levothyroxine (SYNTHROID, LEVOTHROID) 88 mcg, Daily    losartan (COZAAR) 25 mg, Nightly    melatonin 6 mg, Nightly    metoprolol succinate XL (TOPROL-XL) 25 mg, Daily    Multiple Vitamin (MULTIVITAMIN) tablet 1 tablet, Daily    Nutritional Supplements (BOOST SMOOTHIE PO) 1 can, 2 Times Daily PRN    potassium chloride (K-DUR,KLOR-CON) 10 MEQ CR tablet 10 mEq, Daily    potassium  "chloride 10 MEQ CR tablet 1 tablet, Daily    risperiDONE (RISPERDAL) 3 mg, Nightly    risperiDONE (RISPERDAL) 4 mg, Every Night at Bedtime    tamsulosin (FLOMAX) 0.4 MG capsule 24 hr capsule TAKE ONE CAPSULE BY MOUTH NIGHTLY AT BEDTIME        Lab Results   Component Value Date    GLUCOSE 85 08/17/2024    BUN 9 08/17/2024    CREATININE 0.46 (L) 08/17/2024    BCR 19.6 08/17/2024    K 3.7 08/17/2024    CO2 29.7 (H) 08/17/2024    CALCIUM 8.6 08/17/2024    ALBUMIN 3.5 08/16/2024    AST 33 08/16/2024    ALT 22 08/16/2024     Lab Results   Component Value Date    WBC 8.90 08/17/2024    RBC 3.78 (L) 08/17/2024    HGB 12.1 (L) 08/17/2024    HCT 37.5 08/17/2024    MCV 99.2 (H) 08/17/2024     08/17/2024     Lab Results   Component Value Date    TSH 0.67 07/14/2016     No results found for: \"HGBA1C\"  Lab Results   Component Value Date    CHLPL 101 07/14/2016    TRIG 61 07/14/2016    HDL 39 (L) 07/14/2016    LDL 50 07/14/2016      No results found for: \"LIPOA\"   No results found for: \"MICROALBUR\"  No results found for: \"MALBCRERATIO\"  eGFR   Date Value Ref Range Status   08/17/2024 121.2 >60.0 mL/min/1.73 Final       Objective     Vital Signs:  /77 (BP Location: Right arm, Patient Position: Sitting, Cuff Size: Adult)   Pulse 81   Ht 170.2 cm (67\")   Wt 59.9 kg (132 lb)   BMI 20.67 kg/m²   Estimated body mass index is 20.67 kg/m² as calculated from the following:    Height as of this encounter: 170.2 cm (67\").    Weight as of this encounter: 59.9 kg (132 lb).       Cardiovascular:      PMI at left midclavicular line. Normal rate. Regular rhythm. Normal S1. Normal S2.       Murmurs: There is a high frequency blowing holosystolic murmur at the apex.      No gallop.  No click. No rub.   Pulses:     Intact distal pulses.   Edema:     Peripheral edema absent.         ECG 12 Lead    Date/Time: 6/2/2025 12:49 PM  Performed by: Lizzy Mckinley MD    Authorized by: Lizzy Mckinley MD  Comparison: compared with previous ECG " from 12/10/2021  Similar to previous ECG  Rhythm: sinus rhythm  Rate: normal  Conduction: conduction normal  QRS axis: normal  Other findings: non-specific ST-T wave changes  Comments: NSR NSST changes.           Assessment and Plan     Assessment & Plan  Dyspnea on exertion    Orders:    proBNP; Future    Abnormal EKG    Orders:    ECG 12 Lead; Future    ECG 12 Lead    Primary hypertension      Orders:    Comprehensive Metabolic Panel; Future    Microalbumin / Creatinine Urine Ratio - Urine, Clean Catch; Future    Hyperlipidemia LDL goal <100       Orders:    Hepatic Function Panel; Future    High Sensitivity CRP; Future    Lipoprotein A (LPA); Future    Lipid Panel; Future      Follow Up  Return in about 4 weeks (around 6/30/2025).    [facility]

## 2025-06-02 ENCOUNTER — PATIENT ROUNDING (BHMG ONLY) (OUTPATIENT)
Age: 60
End: 2025-06-02

## 2025-06-02 ENCOUNTER — OFFICE VISIT (OUTPATIENT)
Age: 60
End: 2025-06-02
Payer: MEDICARE

## 2025-06-02 ENCOUNTER — LAB (OUTPATIENT)
Facility: HOSPITAL | Age: 60
End: 2025-06-02
Payer: MEDICARE

## 2025-06-02 VITALS
HEIGHT: 67 IN | BODY MASS INDEX: 20.72 KG/M2 | HEART RATE: 81 BPM | SYSTOLIC BLOOD PRESSURE: 142 MMHG | WEIGHT: 132 LBS | DIASTOLIC BLOOD PRESSURE: 77 MMHG

## 2025-06-02 DIAGNOSIS — R06.09 DYSPNEA ON EXERTION: ICD-10-CM

## 2025-06-02 DIAGNOSIS — E78.5 HYPERLIPIDEMIA LDL GOAL <100: ICD-10-CM

## 2025-06-02 DIAGNOSIS — R94.31 ABNORMAL EKG: ICD-10-CM

## 2025-06-02 DIAGNOSIS — R06.09 DYSPNEA ON EXERTION: Primary | ICD-10-CM

## 2025-06-02 DIAGNOSIS — I10 PRIMARY HYPERTENSION: ICD-10-CM

## 2025-06-02 LAB
ALBUMIN SERPL-MCNC: 3.8 G/DL (ref 3.5–5.2)
ALBUMIN/GLOB SERPL: 1.1 G/DL
ALP SERPL-CCNC: 46 U/L (ref 39–117)
ALT SERPL W P-5'-P-CCNC: 12 U/L (ref 1–41)
ANION GAP SERPL CALCULATED.3IONS-SCNC: 9.1 MMOL/L (ref 5–15)
AST SERPL-CCNC: 21 U/L (ref 1–40)
BILIRUB CONJ SERPL-MCNC: 0.1 MG/DL (ref 0–0.3)
BILIRUB SERPL-MCNC: 0.2 MG/DL (ref 0–1.2)
BUN SERPL-MCNC: 16 MG/DL (ref 6–20)
BUN/CREAT SERPL: 25.4 (ref 7–25)
CALCIUM SPEC-SCNC: 9.4 MG/DL (ref 8.6–10.5)
CHLORIDE SERPL-SCNC: 99 MMOL/L (ref 98–107)
CHOLEST SERPL-MCNC: 148 MG/DL (ref 0–200)
CO2 SERPL-SCNC: 29.9 MMOL/L (ref 22–29)
CREAT SERPL-MCNC: 0.63 MG/DL (ref 0.76–1.27)
EGFRCR SERPLBLD CKD-EPI 2021: 109.6 ML/MIN/1.73
GLOBULIN UR ELPH-MCNC: 3.5 GM/DL
GLUCOSE SERPL-MCNC: 83 MG/DL (ref 65–99)
HDLC SERPL-MCNC: 75 MG/DL (ref 40–60)
LDLC SERPL CALC-MCNC: 63 MG/DL (ref 0–100)
LDLC/HDLC SERPL: 0.86 {RATIO}
NT-PROBNP SERPL-MCNC: 148 PG/ML (ref 0–900)
POTASSIUM SERPL-SCNC: 4.6 MMOL/L (ref 3.5–5.2)
PROT SERPL-MCNC: 7.3 G/DL (ref 6–8.5)
SODIUM SERPL-SCNC: 138 MMOL/L (ref 136–145)
TRIGL SERPL-MCNC: 44 MG/DL (ref 0–150)
VLDLC SERPL-MCNC: 10 MG/DL (ref 5–40)

## 2025-06-02 PROCEDURE — 80061 LIPID PANEL: CPT

## 2025-06-02 PROCEDURE — 83880 ASSAY OF NATRIURETIC PEPTIDE: CPT

## 2025-06-02 PROCEDURE — 80053 COMPREHEN METABOLIC PANEL: CPT

## 2025-06-02 PROCEDURE — 82043 UR ALBUMIN QUANTITATIVE: CPT

## 2025-06-02 PROCEDURE — 83695 ASSAY OF LIPOPROTEIN(A): CPT

## 2025-06-02 PROCEDURE — 82248 BILIRUBIN DIRECT: CPT

## 2025-06-02 PROCEDURE — 1159F MED LIST DOCD IN RCRD: CPT | Performed by: INTERNAL MEDICINE

## 2025-06-02 PROCEDURE — 93000 ELECTROCARDIOGRAM COMPLETE: CPT | Performed by: INTERNAL MEDICINE

## 2025-06-02 PROCEDURE — 82570 ASSAY OF URINE CREATININE: CPT

## 2025-06-02 PROCEDURE — 99214 OFFICE O/P EST MOD 30 MIN: CPT | Performed by: INTERNAL MEDICINE

## 2025-06-02 PROCEDURE — 36415 COLL VENOUS BLD VENIPUNCTURE: CPT

## 2025-06-02 PROCEDURE — 1160F RVW MEDS BY RX/DR IN RCRD: CPT | Performed by: INTERNAL MEDICINE

## 2025-06-02 PROCEDURE — 86141 C-REACTIVE PROTEIN HS: CPT

## 2025-06-02 RX ORDER — ACETAMINOPHEN AND CODEINE PHOSPHATE 300; 30 MG/1; MG/1
1 TABLET ORAL EVERY 4 HOURS PRN
COMMUNITY

## 2025-06-02 RX ORDER — ZOLPIDEM TARTRATE 10 MG/1
10 TABLET, FILM COATED ORAL NIGHTLY PRN
COMMUNITY

## 2025-06-02 RX ORDER — BACLOFEN 10 MG/1
5 TABLET ORAL DAILY
COMMUNITY

## 2025-06-02 RX ORDER — POTASSIUM CHLORIDE 750 MG/1
1 TABLET, EXTENDED RELEASE ORAL DAILY
COMMUNITY

## 2025-06-02 RX ORDER — RISPERIDONE 4 MG/1
4 TABLET ORAL
COMMUNITY

## 2025-06-03 LAB
ALBUMIN UR-MCNC: <1.2 MG/DL
CREAT UR-MCNC: 27.6 MG/DL
CRP SERPL-MCNC: 2.85 MG/DL (ref 0.01–0.5)
MICROALBUMIN/CREAT UR: NORMAL MG/G{CREAT}

## 2025-06-03 NOTE — PROGRESS NOTES
My name is Philly López, and I am the Practice Manager for [facility] [place].    I would like to thank you for being a loyal patient. If you do not mind, I would like to ask you some questions about your recent visit with us. Please feel free to reply if you wish to provide us with feedback on your visit with our practice.    First, could you tell me what went well with your recent visit?    Secondly, we are always looking for ways to make our patients' experiences even better. Do you have any recommendations on what we can do to improve your experience?    Finally, overall were you satisfied with your visit with us as a [facility] facility?    Over the next few days, you will be receiving a Patient Experience Survey. Please consider taking the survey, as it helps [facility] in improving their patient care.    Thank you for taking the time to answer our questions today.    I hope you have a good day.

## 2025-06-04 LAB — LPA SERPL-SCNC: 12.8 NMOL/L

## 2025-07-01 ENCOUNTER — OFFICE VISIT (OUTPATIENT)
Age: 60
End: 2025-07-01
Payer: MEDICARE

## 2025-07-01 VITALS
WEIGHT: 130 LBS | HEIGHT: 64 IN | DIASTOLIC BLOOD PRESSURE: 64 MMHG | BODY MASS INDEX: 22.2 KG/M2 | SYSTOLIC BLOOD PRESSURE: 119 MMHG | HEART RATE: 103 BPM

## 2025-07-01 DIAGNOSIS — I10 PRIMARY HYPERTENSION: ICD-10-CM

## 2025-07-01 DIAGNOSIS — E78.5 HYPERLIPIDEMIA LDL GOAL <100: ICD-10-CM

## 2025-07-01 DIAGNOSIS — R06.09 DYSPNEA ON EXERTION: Primary | ICD-10-CM

## 2025-07-01 NOTE — PROGRESS NOTES
Cardiology Established Patient Note     Name: Bossman Luna  :   1965  PCP: Akilah Quick, APRN  Date:   2025  Department: [facility] [place] [facility] CARDIOLOGY  [address]  [facility] [address]  Fax [phone]  Phone [phone]    Chief Complaint:  Here for test results  Follow-up:  1.  PAD   Arterial lower extremity duplex 2022 mild PAD.  2.  Hyperlipidemia  3.  Shortness of breath             -Abnormal EKG 24- ST depression ad t wave inversions R precordial leads   2D echo 2024 EF 50 to 55%  4.  AI         -ECHO 2024-Mild AI  .4  Hypertension benign essential    Subjective     History of Present Illness  Bossman Luna is a 60 y.o. male who presents today for test results.  He has normal BP at home.  Denies chest pain or discomfort.  SOB resolved.  Wheelchair bound x 2 yr due to hx of L hip fracture and surgery 2 yr ago.  Recent labs reviewed as below.    Current Outpatient Medications   Medication Instructions    acetaminophen (TYLENOL) 650 mg, Oral, Every 6 Hours PRN    acetaminophen-codeine (TYLENOL with CODEINE #3) 300-30 MG per tablet 1 tablet, Every 4 Hours PRN    Ambien 10 mg, Nightly PRN    azithromycin (ZITHROMAX) 250 mg, Oral, Daily    baclofen (LIORESAL) 5 mg, Daily    Budesonide (ENTOCORT EC) 9 mg, Daily    Calcium Carb-Cholecalciferol (Oyster Shell Calcium Plus D) 500-200 MG-UNIT tablet 1 tablet, Daily    ciclopirox (Ciclodan) 8 % solution Nightly    clonazePAM (KLONOPIN) 0.75 mg, 4 Times Daily    docusate sodium (COLACE) 250 mg, Nightly    esomeprazole (NEXIUM) 40 mg, Every Morning Before Breakfast    FLUoxetine (PROZAC) 20 mg, Daily    fluticasone (FLONASE) 50 MCG/ACT nasal spray 2 sprays, Daily    levothyroxine (SYNTHROID, LEVOTHROID) 88 mcg, Daily    losartan (COZAAR) 25 mg, Nightly    melatonin 6 mg, Nightly    metoprolol succinate XL (TOPROL-XL) 25 mg, Daily    Multiple Vitamin  "(MULTIVITAMIN) tablet 1 tablet, Daily    Nutritional Supplements (BOOST SMOOTHIE PO) 1 can, 2 Times Daily PRN    potassium chloride (K-DUR,KLOR-CON) 10 MEQ CR tablet 10 mEq, Daily    potassium chloride 10 MEQ CR tablet 1 tablet, Daily    risperiDONE (RISPERDAL) 3 mg, Nightly    risperiDONE (RISPERDAL) 4 mg, Every Night at Bedtime    tamsulosin (FLOMAX) 0.4 MG capsule 24 hr capsule TAKE ONE CAPSULE BY MOUTH NIGHTLY AT BEDTIME      Recent labs-     Latest Reference Range & Units 06/02/25 13:03   proBNP 0.0 - 900.0 pg/mL 148.0   Lipoprotein (a) <75.0 nmol/L 12.8   Sodium 136 - 145 mmol/L 138   Potassium 3.5 - 5.2 mmol/L 4.6   Chloride 98 - 107 mmol/L 99   CO2 22.0 - 29.0 mmol/L 29.9 (H)   Anion Gap 5.0 - 15.0 mmol/L 9.1   BUN 6.0 - 20.0 mg/dL 16.0   Creatinine 0.76 - 1.27 mg/dL 0.63 (L)   BUN/Creatinine Ratio 7.0 - 25.0  25.4 (H)   eGFR >60.0 mL/min/1.73 109.6   Glucose 65 - 99 mg/dL 83   Calcium 8.6 - 10.5 mg/dL 9.4   Alkaline Phosphatase 39 - 117 U/L 46   Total Protein 6.0 - 8.5 g/dL 7.3   Albumin 3.5 - 5.2 g/dL 3.8   Globulin gm/dL 3.5   A/G Ratio g/dL 1.1   AST (SGOT) 1 - 40 U/L 21   ALT (SGPT) 1 - 41 U/L 12   Total Bilirubin 0.0 - 1.2 mg/dL 0.2   Bilirubin, Direct 0.0 - 0.3 mg/dL 0.1   Total Cholesterol 0 - 200 mg/dL 148   HDL Cholesterol 40 - 60 mg/dL 75 (H)   LDL Cholesterol  0 - 100 mg/dL 63   VLDL Cholesterol 5 - 40 mg/dL 10   Triglycerides 0 - 150 mg/dL 44   LDL/HDL Ratio  0.86   Creatinine, Urine mg/dL 27.6   Microalbumin, Urine mg/dL <1.2   Microalbumin/Creatinine Ratio  See Comment   HS C-Reactive Protein 0.010 - 0.500 mg/dL 2.849 (H)   (H): Data is abnormally high  (L): Data is abnormally low                      Objective     Vital Signs:  There were no vitals taken for this visit.  Estimated body mass index is 20.67 kg/m² as calculated from the following:    Height as of 6/2/25: 170.2 cm (67\").    Weight as of 6/2/25: 59.9 kg (132 lb).       Cardiovascular:      PMI at left midclavicular line. Normal " rate. Regular rhythm. Normal S1. Normal S2.       Murmurs: There is a high frequency blowing holosystolic murmur at the apex.      No gallop.  No click. No rub.   Pulses:     Intact distal pulses.   Edema:     Peripheral edema absent.             Assessment and Plan     Assessment & Plan  Dyspnea on exertion  Resolved  RSNV       Primary hypertension  Hypertension is stable and controlled  Continue current treatment regimen.  Blood pressure will be reassessed in 6 months.         Hyperlipidemia LDL goal <100   Lipid abnormalities are stable    Plan:  Continue same medication/s without change.      Discussed medication dosage, use, side effects, and goals of treatment in detail.    Counseled patient on lifestyle modifications to help control hyperlipidemia.     Patient Treatment Goals:   LDL goal is less than 70    Followup in 6 months.           Follow Up  No follow-ups on file.    [facility]

## (undated) DEVICE — DRP SUP TRIDENT FACE

## (undated) DEVICE — NDL FLTR2 THN 19G 1IN

## (undated) DEVICE — GLV SURG BIOGEL M LTX PF 6 1/2

## (undated) DEVICE — GLV SURG SENSICARE W/ALOE PF LF 7 STRL

## (undated) DEVICE — SINGLE USE MEDICAL DEVICE FOR OPHTHALMIC SURGERY: Brand: 23G ASP HANDPIECE ROUGH 12/BOX

## (undated) DEVICE — DRSNG GZ PETROLTM XEROFORM CURAD 1X8IN STRL

## (undated) DEVICE — DRAPE,U/ SHT,SPLIT,PLAS,STERIL: Brand: MEDLINE

## (undated) DEVICE — SUT VIC 0 CT 36IN J958H

## (undated) DEVICE — GLV SURG BIOGEL M LTX PF 8

## (undated) DEVICE — SUT VIC 2/0 CT1 27IN J259H

## (undated) DEVICE — VIOLET BRAIDED (POLYGLACTIN 910), SYNTHETIC ABSORBABLE SUTURE: Brand: COATED VICRYL

## (undated) DEVICE — Device

## (undated) DEVICE — SUT ETHIB 2 CV V37 MS/4 30IN MX69G

## (undated) DEVICE — GLV SURG SENSICARE PI LF PF 8 GRN STRL

## (undated) DEVICE — TOWEL,OR,DSP,ST,BLUE,STD,4/PK,20PK/CS: Brand: MEDLINE

## (undated) DEVICE — SOL IRRIG H2O 1000ML STRL

## (undated) DEVICE — 3M™ STERI-DRAPE™ U-DRAPE 1015: Brand: STERI-DRAPE™

## (undated) DEVICE — POST OP EYE CARE KIT: Brand: MEDLINE

## (undated) DEVICE — IRRISEPT WOUND DEBRIDMENT AND CLEANSING SYSTEM: Brand: IRRISEPT

## (undated) DEVICE — CANN HYDRODISSECTION

## (undated) DEVICE — EYE PAK* 1033 NON-WOVEN OPHTHALMIC DRAPE APERTURE POUCHES: Brand: ALCON EYE-PAK

## (undated) DEVICE — ZIPPERED TOGA, X-LARGE: Brand: FLYTE

## (undated) DEVICE — PK HIP GEN 20

## (undated) DEVICE — SYS SKIN CLS DERMABOND PRINEO W/22CM MESH TP

## (undated) DEVICE — SLV SCD CALF HEMOFORCE DVT THERP REPROC MD

## (undated) DEVICE — HANDPIECE SET WITH HIGH FLOW TIP AND SUCTION TUBE: Brand: INTERPULSE

## (undated) DEVICE — 0.8MM CLEARPORT PARA KNIFE: Brand: SHARPOINT

## (undated) DEVICE — TIBURON HIP DRAPE WITH POUCHES: Brand: CONVERTORS

## (undated) DEVICE — 450 ML BOTTLE OF 0.05% CHLORHEXIDINE GLUCONATE IN 99.95% STERILE WATER FOR IRRIGATION, USP AND APPLICATOR.: Brand: IRRISEPT ANTIMICROBIAL WOUND LAVAGE

## (undated) DEVICE — CANN IRR/INJ AIR ANT CHAMBER 6MM BEND 27G

## (undated) DEVICE — CLEAR CORNEAL KNIFE 2.4MM ANG: Brand: SHARPOINT

## (undated) DEVICE — RECIPROCATING BLADE HEAVY DUTY LONG, OFFSET  (77.6 X 0.77 X 11.2MM)

## (undated) DEVICE — GLV SURG SENSICARE W/ALOE PF LF 6.5 STRL

## (undated) DEVICE — SINGLE USE MEDICAL DEVICE FOR OPHTHALMIC SURGERY: Brand: 23G IRR HANDPIECE SMOOTH 12B

## (undated) DEVICE — PILLW ABD SM

## (undated) DEVICE — 1000 S-DRAPE TOWEL DRAPE 10/BX: Brand: STERI-DRAPE™

## (undated) DEVICE — DRSNG SURESITE WNDW 2.38X2.75